# Patient Record
Sex: MALE | Race: WHITE | Employment: OTHER | URBAN - METROPOLITAN AREA
[De-identification: names, ages, dates, MRNs, and addresses within clinical notes are randomized per-mention and may not be internally consistent; named-entity substitution may affect disease eponyms.]

---

## 2016-01-28 LAB — CREATININE, EXTERNAL: 0.65

## 2017-01-28 LAB
CREATININE, EXTERNAL: 0.6
HBA1C MFR BLD HPLC: 5.7 %

## 2017-02-16 ENCOUNTER — TELEPHONE (OUTPATIENT)
Dept: ONCOLOGY | Age: 47
End: 2017-02-16

## 2017-02-16 ENCOUNTER — OFFICE VISIT (OUTPATIENT)
Dept: ONCOLOGY | Age: 47
End: 2017-02-16

## 2017-02-16 VITALS
WEIGHT: 167 LBS | BODY MASS INDEX: 25.31 KG/M2 | TEMPERATURE: 98.1 F | OXYGEN SATURATION: 96 % | RESPIRATION RATE: 18 BRPM | HEIGHT: 68 IN

## 2017-02-16 DIAGNOSIS — R63.4 UNINTENTIONAL WEIGHT LOSS: ICD-10-CM

## 2017-02-16 DIAGNOSIS — D50.9 IRON DEFICIENCY ANEMIA, UNSPECIFIED IRON DEFICIENCY ANEMIA TYPE: Primary | ICD-10-CM

## 2017-02-16 NOTE — MR AVS SNAPSHOT
Visit Information Date & Time Provider Department Dept. Phone Encounter #  
 2/16/2017 12:00 PM Tami Marcum  [facility] Oncology at [facility] 003 [id number] Your Appointments 5/8/2017 10:00 AM  
ROUTINE CARE with Jose Carlos Grant MD  
[facility]) Appt Note: 6 months follow up visit [address] [id number]  
[phone]  
  
   
 [address] [id number] Upcoming Health Maintenance Date Due  
 LIPID PANEL Q1 11/8/2014 HEMOGLOBIN A1C Q6M 5/7/2017 DTaP/Tdap/Td series (2 - Td) 2/22/2024 Allergies as of 2/16/2017  Review Complete On: 2/16/2017 By: Tami Marcum MD  
  
 Severity Noted Reaction Type Reactions Invega [Paliperidone]  11/16/2011    Unknown (comments) Current Immunizations  Reviewed on 5/4/2015 Name Date Influenza Vaccine 12/22/2015, 11/7/2013 Influenza Vaccine (Quad) PF 11/7/2016 10:35 AM  
 Pneumococcal Polysaccharide (PPSV-23) 5/4/2015  2:30 PM  
 Tdap 2/22/2014  9:48 AM, 11/7/2013 Not reviewed this visit You Were Diagnosed With   
  
 Codes Comments Iron deficiency anemia, unspecified iron deficiency anemia type    -  Primary ICD-10-CM: D50.9 ICD-9-CM: 280.9 Unintentional weight loss     ICD-10-CM: R63.4 ICD-9-CM: 783.21 Vitals Temp Resp Height(growth percentile) Weight(growth percentile) SpO2 BMI  
 98.1 °F (36.7 °C) (Oral) 18 5' 8\" (1.727 m) 167 lb (75.8 kg) 96% 25.39 kg/m2 Smoking Status Never Smoker BMI and BSA Data Body Mass Index Body Surface Area  
 25.39 kg/m 2 1.91 m 2 Preferred Pharmacy Pharmacy Name Phone [facility] - [address] [place] [address] [phone] Your Updated Medication List  
  
   
This list is accurate as of: 2/16/17 12:43 PM.  Always use your most recent med list.  
  
  
  
  
 BISAC-EVAC 10 mg suppository Generic drug:  bisacodyl Insert 10 mg into rectum every other day. BOOST HIGH PROTEIN PO Take  by mouth. Boost Chocolate Flavor Drink contents of 1 ml three times daily for weight loss  
  
 bromocriptine 2.5 mg tablet Commonly known as:  PARLODEL Take 2.5 mg by mouth two (2) times a day. clonazePAM 0.5 mg tablet Commonly known as:  Lyons Altes Take  by mouth two (2) times a day. docusate sodium 100 mg capsule Commonly known as:  Kristen Mule Take 100 mg by mouth two (2) times a day. lithium carbonate  mg CR tablet Commonly known as:  Selestino Kelch Take 300 mg by mouth four (4) times daily. LORazepam 2 mg tablet Commonly known as:  ATIVAN Take  by mouth every eight (8) hours as needed for Anxiety. LUNESTA 2 mg tablet Generic drug:  eszopiclone Take 3 mg by mouth nightly. melatonin 3 mg tablet Take 1 tablet by mouth 2 hours prior to bedtime ONE-A-DAY MEN'S MULTIVITAMIN 400-300 mcg Tab Generic drug:  multivit with min-FA-lycopene Take  by mouth. PREVACID 30 mg capsule Generic drug:  lansoprazole Take 30 mg by mouth Daily (before breakfast). propranolol 80 mg tablet Commonly known as:  INDERAL Take  by mouth two (2) times a day. Hold if systolic  Pressure is below 100 RHINOCORT AQUA 32 mcg/actuation nasal spray Generic drug:  budesonide 2 Sprays by Both Nostrils route two (2) times a day. SANCTURA 20 mg tablet Generic drug:  trospium Take  by mouth two (2) times a day. SENEXON 8.6 mg tablet Generic drug:  senna Take 1 Tab by mouth two (2) times a day. traZODone 300 mg tablet Commonly known as:  Rojas Fray Take 300 mg by mouth nightly. VITAMIN D2 50,000 unit capsule Generic drug:  ergocalciferol Take 50,000 Units by mouth every seven (7) days. On Mondays XANAX 0.5 mg tablet Generic drug:  ALPRAZolam  
 Take  by mouth. Take 1 tablet by mouth 30 minutes prior to CT scan for sedation We Performed the Following REFERRAL TO GASTROENTEROLOGY [ZAY29 Custom] To-Do List   
 03/30/2017 Lab:  CBC WITH AUTOMATED DIFF   
  
 03/30/2017 Lab:  FERRITIN   
  
 03/30/2017 Lab:  METABOLIC PANEL, COMPREHENSIVE [facility] SERVICES! Dear Jem Mccarthy: Thank you for requesting a [facility] account. Our records indicate that you already have an active [facility] account. You can access your account anytime at [email or web address] Did you know that you can access your hospital and ER discharge instructions at any time in [facility]? You can also review all of your test results from your hospital stay or ER visit. Additional Information If you have questions, please visit the Frequently Asked Questions section of the [facility] website at [email or web address] Remember, [facility] is NOT to be used for urgent needs. For medical emergencies, dial 911. Now available from your iPhone and Android! Please provide this summary of care documentation to your next provider. Your primary care clinician is listed as Marko Barrett. If you have any questions after today's visit, please call [phone].

## 2017-02-16 NOTE — PROGRESS NOTES
Hematology/Oncology Consult    REASON FOR CONSULT: Iron deficiency anemia  REQUESTED BY: Dr. Rissa Berumen: Mr. Edmund Whiteside" is a 49 y.o. male who presents for evaluation of iron deficiency anemia due to chronic blood loss. He had a traumatic brain injury in 2001. He is a resident at a [facility] neurorehab facility. He can give me limited history. Denies bleeding. I do have records from Dr. Laura Espinal and Dr. Isael Burns. He did have bleeding ulcers in 2015. Right now, no epistaxis, no hemoptysis, no hematemesis, no blood per rectum, no black or tarry stool. No hematuria. CT abdomen 12/16 was unremarkable. He has not seen a gastroenterologist.  Has lost about 20 pounds unintentionally. Past Medical History:   Diagnosis Date    DM (diabetes mellitus) ([facility] [place] [address] 8/10/2010    father says no D. 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 8/10/2010    Mood disorder in conditions classified elsewhere 8/10/2010    Other specified transient mental disorders due to conditions classified elsewhere(293.89) 8/10/2010    Quadriplegia, unspecified ([facility] [address] 8/10/2010    Skin lesion 2/10/2014    Small bowel obstruction ([facility] [address] 4/6/2015       Past Surgical History:   Procedure Laterality Date    ABDOMEN SURGERY PROC UNLISTED      \"Ulcer surgery\"/Implantable Baclofen pump    HX OTHER SURGICAL  4/12/15    ANTONIO, exp lap, sm bowel resection    HX OTHER SURGICAL  4/15/15    exp lap with wash out        Allergies   Allergen Reactions    Invega [Paliperidone] Unknown (comments)       Current Outpatient Prescriptions   Medication Sig Dispense Refill    senna (SENEXON) 8.6 mg tablet Take 1 Tab by mouth two (2) times a day.  melatonin 3 mg tablet Take 1 tablet by mouth 2 hours prior to bedtime      trospium (SANCTURA) 20 mg tablet Take  by mouth two (2) times a day.       clonazePAM (KLONOPIN) 0.5 mg tablet Take  by mouth two (2) times a day.  LACTOSE-REDUCED FOOD (BOOST HIGH PROTEIN PO) Take  by mouth. Boost Chocolate Flavor Drink contents of 1 ml three times daily for weight loss      bisacodyl (BISAC-EVAC) 10 mg suppository Insert 10 mg into rectum every other day.  bromocriptine (PARLODEL) 2.5 mg tablet Take 2.5 mg by mouth two (2) times a day.  docusate sodium (COLACE) 100 mg capsule Take 100 mg by mouth two (2) times a day.  lithium carbonate SR (LITHOBID) 300 mg CR tablet Take 300 mg by mouth four (4) times daily.  traZODone (DESYREL) 300 mg tablet Take 300 mg by mouth nightly.  propranolol (INDERAL) 80 mg tablet Take  by mouth two (2) times a day. Hold if systolic  Pressure is below 100      LORazepam (ATIVAN) 2 mg tablet Take  by mouth every eight (8) hours as needed for Anxiety.  eszopiclone (LUNESTA) 2 mg tablet Take 3 mg by mouth nightly.  ergocalciferol (VITAMIN D) 50,000 unit capsule Take 50,000 Units by mouth every seven (7) days. On Mondays      budesonide (RHINOCORT AQUA) 32 mcg/Actuation nasal spray 2 Sprays by Both Nostrils route two (2) times a day.  ALPRAZolam (XANAX) 0.5 mg tablet Take  by mouth. Take 1 tablet by mouth 30 minutes prior to CT scan for sedation      multivit with min-FA-lycopene (ONE-A-DAY MEN'S MULTIVITAMIN) 400-300 mcg tab Take  by mouth.  lansoprazole (PREVACID) 30 mg capsule Take 30 mg by mouth Daily (before breakfast).          Social History     Social History    Marital status: SINGLE     Spouse name: N/A    Number of children: N/A    Years of education: N/A     Social History Main Topics    Smoking status: Never Smoker    Smokeless tobacco: Never Used    Alcohol use No      Comment: prior alcoholic    Drug use: No    Sexual activity: Not on file     Other Topics Concern    Not on file     Social History Narrative       Family History   Problem Relation Age of Onset    Cancer Father Waldenstroms macroglobulinemia        ROS  As per the HPI, otherwise a comprehensive ROS is negative. Physical Examination:   Visit Vitals    Temp 98.1 °F (36.7 °C) (Oral)    Resp 18    Ht 5' 8\" (1.727 m)    Wt 167 lb (75.8 kg)    SpO2 96%    BMI 25.39 kg/m2     General appearance - alert, in wheelchair, and in no distress  Mental status - gives some history, is pleasant. oriented to person, place, and time  Mouth - mucous membranes moist, pharynx normal without lesions  Neck - supple, no significant adenopathy  Lymphatics - no palpable lymphadenopathy, no hepatosplenomegaly  Chest - clear to auscultation, no wheezes, rales or rhonchi, symmetric air entry  Heart - normal rate, regular rhythm, normal S1, S2, no murmurs, rubs, clicks or gallops  Abdomen - soft, nontender, nondistended, no masses or organomegaly, bowel sounds present  Musculoskeletal - no joint tenderness, deformity or swelling  Extremities - peripheral pulses normal, no pedal edema, no clubbing or cyanosis  Skin - normal coloration and turgor, no rashes, no suspicious skin lesions noted    LABS  Lab Results   Component Value Date/Time    WBC 13.6 05/04/2015 01:40 AM    HGB 9.8 05/04/2015 01:40 AM    HCT 31.7 05/04/2015 01:40 AM    PLATELET 675 26/96/8591 01:40 AM    MCV 82.8 05/04/2015 01:40 AM    ABS. NEUTROPHILS 10.7 05/04/2015 01:40 AM     Lab Results   Component Value Date/Time    Sodium 136 05/01/2015 01:09 PM    Potassium 4.1 05/01/2015 01:09 PM    Chloride 107 05/01/2015 01:09 PM    CO2 22 05/01/2015 01:09 PM    Glucose 195 05/01/2015 01:09 PM    BUN 13 05/01/2015 01:09 PM    Creatinine 0.59 05/01/2015 01:09 PM    GFR est AA >60 05/01/2015 01:09 PM    GFR est non-AA >60 05/01/2015 01:09 PM    Calcium 8.4 05/01/2015 01:09 PM     Lab Results   Component Value Date/Time    AST (SGOT) 20 04/14/2015 02:45 AM    Alk.  phosphatase 80 04/14/2015 02:45 AM    Protein, total 4.6 04/14/2015 02:45 AM    Albumin 2.1 04/14/2015 02:45 AM    Globulin 2.5 04/14/2015 02:45 AM    A-G Ratio 0.8 04/14/2015 02:45 AM     IMAGING  CT abdomen on 12/20/16 with marked distention of the rectum and sigmoid colon which is filled with fecal material.: Just proximal to this is air-filled and distended. ASSESSMENT  Mr. Brandy Turcios is a 49 y.o. male who presents for evaluation of anemia. DISCUSSION/PLAN  1. Anemia. I reviewed his labs in detail. He has long-standing anemia which at times has been microcytic. He has decreased iron stores. Clinically this is a picture of iron deficiency anemia. He cannot tolerate oral iron due to issues with constipation. We discussed the risks and benefits of IV iron in detail, including the small but real risk of allergic reaction, including fatal anaphylaxis. He aggress to proceed. The next issue is the source of the blood loss. I have referred him to gastroenterology. He does have a history of ulcers. 2. Weight loss. He has iron deficiency anemia and a 20 pound unintentional weight loss. These are alarm signs for malignancy. It is reassuring that he had a normal-appearing CT scan of the abdomen in December 2016. However, he still needs to see gastroenterology. I have placed a referral.    The best  for infusions and lab results is  [facility] - [phone]. I'll see him back in 6 weeks with repeat labs, but sooner if needed. Dayami Lebron MD    Mr. Brandy Turcios has a reminder for a \"due or due soon\" health maintenance. I have asked that he contact his primary care provider for follow-up on this health maintenance.

## 2017-02-17 ENCOUNTER — TELEPHONE (OUTPATIENT)
Dept: FAMILY MEDICINE CLINIC | Age: 47
End: 2017-02-17

## 2017-02-17 NOTE — TELEPHONE ENCOUNTER
100 Saravanan Ortega email from [facility] asking to have medication list review per Dr Palomino Sosa send to Gala Gibson to evaluate medication     [facility]

## 2017-02-17 NOTE — TELEPHONE ENCOUNTER
----- Message from TRAM Simon sent at 2/17/2017 10:12 AM EST -----  Regarding: Medication review  Reviewed his medications as requested. Looks like patient has been on many of these medications for a while. Here are some considerations. 1) Lithium:  Please check to see if there are more recent thyroid function tests (last ones in chart are 11/8/13). If not would recommend getting these yearly. Lithium levels and Scr are up to date and wnl/therapeutic. 2)  Patient is on melatonin, lunesta, and trazodone. Patient may need all of these, but would continually reassess the need for all three of these. 3)  Make sure staff is checking peripheral pulses on patient intermittantly. Bromocriptine can increase the risk of peripheral vasoconstriction when used with concomitant beta-blockers. He is on low doses of the these medications. Let me know if you have any questions.     Roddy Pickard

## 2017-03-20 ENCOUNTER — TELEPHONE (OUTPATIENT)
Dept: ONCOLOGY | Age: 47
End: 2017-03-20

## 2017-03-20 NOTE — TELEPHONE ENCOUNTER
[facility] calling to see the status of the IV Iron Infusions. Please call the  Tonya Pires back at [phone].

## 2017-03-23 RX ORDER — SODIUM CHLORIDE 9 MG/ML
25 INJECTION, SOLUTION INTRAVENOUS CONTINUOUS
Status: DISPENSED | OUTPATIENT
Start: 2017-03-27 | End: 2017-03-28

## 2017-03-27 ENCOUNTER — HOSPITAL ENCOUNTER (OUTPATIENT)
Dept: INFUSION THERAPY | Age: 47
Discharge: HOME | End: 2017-03-27
Payer: MEDICARE

## 2017-03-27 VITALS
DIASTOLIC BLOOD PRESSURE: 88 MMHG | RESPIRATION RATE: 18 BRPM | OXYGEN SATURATION: 97 % | SYSTOLIC BLOOD PRESSURE: 133 MMHG | TEMPERATURE: 97.9 F | HEART RATE: 88 BPM

## 2017-03-27 PROCEDURE — 96365 THER/PROPH/DIAG IV INF INIT: CPT

## 2017-03-27 PROCEDURE — 74011250636 HC RX REV CODE- 250/636: Performed by: NURSE PRACTITIONER

## 2017-03-27 RX ORDER — SODIUM CHLORIDE 0.9 % (FLUSH) 0.9 %
10-40 SYRINGE (ML) INJECTION AS NEEDED
Status: DISCONTINUED | OUTPATIENT
Start: 2017-03-27 | End: 2017-03-31 | Stop reason: HOSPADM

## 2017-03-27 RX ADMIN — Medication 10 ML: at 12:40

## 2017-03-27 RX ADMIN — FERRIC CARBOXYMALTOSE INJECTION 750 MG: 50 INJECTION, SOLUTION INTRAVENOUS at 14:23

## 2017-03-27 RX ADMIN — SODIUM CHLORIDE 25 ML/HR: 900 INJECTION, SOLUTION INTRAVENOUS at 12:45

## 2017-03-27 NOTE — PROGRESS NOTES
46 Pt admit to [facility] for [facility] patient arrived  in wheelchair with caretaker stable condition. Assessment completed. No new concerns voiced. Peripheral IV established right forearm with positive blood return. Normal Saline started at [facility]. Medication ordered. Visit Vitals    /88    Pulse 88    Temp 97.9 °F (36.6 °C)    Resp 18    SpO2 97%       Medications:  Normal Saline KVO  Injectafer IV    1545 Pt tolerated treatment well. Peripheral IV maintained positive blood return throughout treatment, flushed with positive blood return and removed at conclusion. D/c home in wheelchair with caretaker in  no distress. Pt aware of next appointment scheduled for 4/3/17.

## 2017-03-30 DIAGNOSIS — D50.9 IRON DEFICIENCY ANEMIA, UNSPECIFIED IRON DEFICIENCY ANEMIA TYPE: ICD-10-CM

## 2017-03-30 DIAGNOSIS — R63.4 UNINTENTIONAL WEIGHT LOSS: ICD-10-CM

## 2017-04-03 ENCOUNTER — HOSPITAL ENCOUNTER (OUTPATIENT)
Dept: INFUSION THERAPY | Age: 47
Discharge: HOME | End: 2017-04-03
Payer: MEDICARE

## 2017-04-03 VITALS
SYSTOLIC BLOOD PRESSURE: 119 MMHG | OXYGEN SATURATION: 100 % | RESPIRATION RATE: 18 BRPM | DIASTOLIC BLOOD PRESSURE: 78 MMHG | TEMPERATURE: 98 F | HEART RATE: 69 BPM

## 2017-04-03 PROCEDURE — 74011250636 HC RX REV CODE- 250/636: Performed by: NURSE PRACTITIONER

## 2017-04-03 PROCEDURE — 96374 THER/PROPH/DIAG INJ IV PUSH: CPT

## 2017-04-03 RX ORDER — SODIUM CHLORIDE 0.9 % (FLUSH) 0.9 %
5-10 SYRINGE (ML) INJECTION AS NEEDED
Status: CANCELLED | OUTPATIENT
Start: 2017-04-03 | End: 2017-04-04

## 2017-04-03 RX ADMIN — FERRIC CARBOXYMALTOSE INJECTION 750 MG: 50 INJECTION, SOLUTION INTRAVENOUS at 12:34

## 2017-04-03 NOTE — PROGRESS NOTES
200  Pt admit to [facility] for [facility] patient arrived  in wheelchair with caretaker stable condition. Assessment completed. No new concerns voiced. Peripheral IV established right forearm with positive blood return. Normal Saline started at [facility]. Medication ordered. Patient Vitals for the past 12 hrs:   Temp Pulse Resp BP SpO2   04/03/17 1302 98 °F (36.7 °C) 69 18 119/78 100 %   04/03/17 1155 98 °F (36.7 °C) 66 18 101/72 96 %         Medications:  Normal Saline KVO  Injectafer IV    1300 Pt tolerated treatment well. Peripheral IV maintained positive blood return throughout treatment, flushed with positive blood return and removed at conclusion. D/c home in wheelchair with caretaker in  no distress.  Pt to follow up with md

## 2017-04-26 ENCOUNTER — TELEPHONE (OUTPATIENT)
Dept: ONCOLOGY | Age: 47
End: 2017-04-26

## 2017-04-26 LAB
ALBUMIN SERPL-MCNC: 4.1 G/DL (ref 3.5–5.5)
ALBUMIN/GLOB SERPL: 2.3 {RATIO} (ref 1.2–2.2)
ALP SERPL-CCNC: 72 IU/L (ref 39–117)
ALT SERPL-CCNC: 17 IU/L (ref 0–44)
AST SERPL-CCNC: 26 IU/L (ref 0–40)
BASOPHILS # BLD AUTO: NORMAL 10*3/UL
BILIRUB SERPL-MCNC: 0.3 MG/DL (ref 0–1.2)
BUN SERPL-MCNC: 13 MG/DL (ref 6–24)
BUN/CREAT SERPL: 22 (ref 9–20)
CALCIUM SERPL-MCNC: 8.4 MG/DL (ref 8.7–10.2)
CHLORIDE SERPL-SCNC: 102 MMOL/L (ref 96–106)
CO2 SERPL-SCNC: 15 MMOL/L (ref 18–29)
CREAT SERPL-MCNC: 0.6 MG/DL (ref 0.76–1.27)
EOSINOPHIL # BLD AUTO: NORMAL 10*3/UL
EOSINOPHIL NFR BLD AUTO: NORMAL %
FERRITIN SERPL-MCNC: 235 NG/ML (ref 30–400)
GLOBULIN SER CALC-MCNC: 1.8 G/DL (ref 1.5–4.5)
GLUCOSE SERPL-MCNC: 76 MG/DL (ref 65–99)
HCT VFR BLD AUTO: NORMAL %
HGB BLD-MCNC: NORMAL G/DL
LYMPHOCYTES # BLD AUTO: NORMAL 10*3/UL
LYMPHOCYTES NFR BLD AUTO: NORMAL %
MONOCYTES NFR BLD AUTO: NORMAL %
NEUTROPHILS NFR BLD AUTO: NORMAL %
PLATELET # BLD AUTO: NORMAL 10*3/UL
POTASSIUM SERPL-SCNC: 8 MMOL/L (ref 3.5–5.2)
PROT SERPL-MCNC: 5.9 G/DL (ref 6–8.5)
RBC # BLD AUTO: NORMAL 10*6/UL
SODIUM SERPL-SCNC: 135 MMOL/L (ref 134–144)
WBC # BLD AUTO: NORMAL X10E3/UL

## 2017-04-28 ENCOUNTER — HOSPITAL ENCOUNTER (OUTPATIENT)
Age: 47
Setting detail: OUTPATIENT SURGERY
Discharge: HOME | End: 2017-04-28
Attending: INTERNAL MEDICINE | Admitting: INTERNAL MEDICINE
Payer: MEDICARE

## 2017-04-28 ENCOUNTER — ANESTHESIA (OUTPATIENT)
Dept: ENDOSCOPY | Age: 47
End: 2017-04-28
Payer: MEDICARE

## 2017-04-28 ENCOUNTER — ANESTHESIA EVENT (OUTPATIENT)
Dept: ENDOSCOPY | Age: 47
End: 2017-04-28
Payer: MEDICARE

## 2017-04-28 VITALS
BODY MASS INDEX: 18.79 KG/M2 | DIASTOLIC BLOOD PRESSURE: 50 MMHG | HEIGHT: 68 IN | RESPIRATION RATE: 18 BRPM | HEART RATE: 55 BPM | SYSTOLIC BLOOD PRESSURE: 116 MMHG | OXYGEN SATURATION: 99 % | WEIGHT: 124 LBS | TEMPERATURE: 97.6 F

## 2017-04-28 PROCEDURE — 77030027957 HC TBNG IRR ENDOGTR BUSS -B: Performed by: INTERNAL MEDICINE

## 2017-04-28 PROCEDURE — 74011000250 HC RX REV CODE- 250

## 2017-04-28 PROCEDURE — 76060000031 HC ANESTHESIA FIRST 0.5 HR: Performed by: INTERNAL MEDICINE

## 2017-04-28 PROCEDURE — 74011250636 HC RX REV CODE- 250/636

## 2017-04-28 PROCEDURE — 76040000019: Performed by: INTERNAL MEDICINE

## 2017-04-28 RX ORDER — DEXTROMETHORPHAN/PSEUDOEPHED 2.5-7.5/.8
1.2 DROPS ORAL
Status: DISCONTINUED | OUTPATIENT
Start: 2017-04-28 | End: 2017-04-28 | Stop reason: HOSPADM

## 2017-04-28 RX ORDER — LIDOCAINE HYDROCHLORIDE 20 MG/ML
INJECTION, SOLUTION EPIDURAL; INFILTRATION; INTRACAUDAL; PERINEURAL AS NEEDED
Status: DISCONTINUED | OUTPATIENT
Start: 2017-04-28 | End: 2017-04-28 | Stop reason: HOSPADM

## 2017-04-28 RX ORDER — MIDAZOLAM HYDROCHLORIDE 1 MG/ML
.25-1 INJECTION, SOLUTION INTRAMUSCULAR; INTRAVENOUS
Status: DISCONTINUED | OUTPATIENT
Start: 2017-04-28 | End: 2017-04-28 | Stop reason: HOSPADM

## 2017-04-28 RX ORDER — NALOXONE HYDROCHLORIDE 0.4 MG/ML
0.4 INJECTION, SOLUTION INTRAMUSCULAR; INTRAVENOUS; SUBCUTANEOUS
Status: DISCONTINUED | OUTPATIENT
Start: 2017-04-28 | End: 2017-04-28 | Stop reason: HOSPADM

## 2017-04-28 RX ORDER — ATROPINE SULFATE 0.1 MG/ML
0.5 INJECTION INTRAVENOUS
Status: DISCONTINUED | OUTPATIENT
Start: 2017-04-28 | End: 2017-04-28 | Stop reason: HOSPADM

## 2017-04-28 RX ORDER — SODIUM CHLORIDE 9 MG/ML
50 INJECTION, SOLUTION INTRAVENOUS CONTINUOUS
Status: DISCONTINUED | OUTPATIENT
Start: 2017-04-28 | End: 2017-04-28 | Stop reason: HOSPADM

## 2017-04-28 RX ORDER — FENTANYL CITRATE 50 UG/ML
100 INJECTION, SOLUTION INTRAMUSCULAR; INTRAVENOUS
Status: DISCONTINUED | OUTPATIENT
Start: 2017-04-28 | End: 2017-04-28 | Stop reason: HOSPADM

## 2017-04-28 RX ORDER — SODIUM CHLORIDE 0.9 % (FLUSH) 0.9 %
5-10 SYRINGE (ML) INJECTION AS NEEDED
Status: DISCONTINUED | OUTPATIENT
Start: 2017-04-28 | End: 2017-04-28 | Stop reason: HOSPADM

## 2017-04-28 RX ORDER — PROPOFOL 10 MG/ML
INJECTION, EMULSION INTRAVENOUS AS NEEDED
Status: DISCONTINUED | OUTPATIENT
Start: 2017-04-28 | End: 2017-04-28 | Stop reason: HOSPADM

## 2017-04-28 RX ORDER — SODIUM CHLORIDE 9 MG/ML
INJECTION, SOLUTION INTRAVENOUS
Status: DISCONTINUED | OUTPATIENT
Start: 2017-04-28 | End: 2017-04-28 | Stop reason: HOSPADM

## 2017-04-28 RX ORDER — FLUMAZENIL 0.1 MG/ML
0.2 INJECTION INTRAVENOUS
Status: DISCONTINUED | OUTPATIENT
Start: 2017-04-28 | End: 2017-04-28 | Stop reason: HOSPADM

## 2017-04-28 RX ORDER — EPINEPHRINE 0.1 MG/ML
1 INJECTION INTRACARDIAC; INTRAVENOUS
Status: DISCONTINUED | OUTPATIENT
Start: 2017-04-28 | End: 2017-04-28 | Stop reason: HOSPADM

## 2017-04-28 RX ORDER — SODIUM CHLORIDE 0.9 % (FLUSH) 0.9 %
5-10 SYRINGE (ML) INJECTION EVERY 8 HOURS
Status: DISCONTINUED | OUTPATIENT
Start: 2017-04-28 | End: 2017-04-28 | Stop reason: HOSPADM

## 2017-04-28 RX ADMIN — PROPOFOL 20 MG: 10 INJECTION, EMULSION INTRAVENOUS at 09:43

## 2017-04-28 RX ADMIN — PROPOFOL 20 MG: 10 INJECTION, EMULSION INTRAVENOUS at 09:45

## 2017-04-28 RX ADMIN — PROPOFOL 50 MG: 10 INJECTION, EMULSION INTRAVENOUS at 09:47

## 2017-04-28 RX ADMIN — PROPOFOL 20 MG: 10 INJECTION, EMULSION INTRAVENOUS at 09:41

## 2017-04-28 RX ADMIN — SODIUM CHLORIDE: 9 INJECTION, SOLUTION INTRAVENOUS at 09:28

## 2017-04-28 RX ADMIN — LIDOCAINE HYDROCHLORIDE 40 MG: 20 INJECTION, SOLUTION EPIDURAL; INFILTRATION; INTRACAUDAL; PERINEURAL at 09:39

## 2017-04-28 RX ADMIN — PROPOFOL 20 MG: 10 INJECTION, EMULSION INTRAVENOUS at 09:39

## 2017-04-28 NOTE — ROUTINE PROCESS
Laura Smoke  1970  [id number]    Situation:  Verbal report received from: [facility]  Procedure: Procedure(s):  COLONOSCOPY / EGD   ESOPHAGOGASTRODUODENOSCOPY (EGD)    Background:    Preoperative diagnosis: ABNORMAL WEIGHT LOSS   Postoperative diagnosis: 1.- SP Gastric Resection    :  Dr. Hallie Farrell  Assistant(s): Endoscopy Technician-1: Cindy Young IV  Endoscopy RN-1: Chaim Payton RN    Specimens: * No specimens in log *  H. Pylori  no    Assessment:  Intra-procedure medications     Anesthesia gave intra-procedure sedation and medications, see anesthesia flow sheet yes    Intravenous fluids: NS@ KVO     Vital signs stable     Abdominal assessment: round and soft     Recommendation:  Discharge patient per MD order.   Return to   [facility] or Friend   Permission to share finding with family or friend yes

## 2017-04-28 NOTE — ANESTHESIA POSTPROCEDURE EVALUATION
Post-Anesthesia Evaluation and Assessment    Patient: Marnie Amaya MRN: [id number]  SSN: [id number]    YOB: 1970  Age: 49 y.o. Sex: male       Cardiovascular Function/Vital Signs  Visit Vitals    /69    Pulse (!) 51    Temp 36.6 °C (97.8 °F)    Resp 11    Ht 5' 8\" (1.727 m)    Wt 56.2 kg (124 lb)    SpO2 99%    BMI 18.85 kg/m2       Patient is status post MAC anesthesia for Procedure(s):  COLONOSCOPY / EGD   ESOPHAGOGASTRODUODENOSCOPY (EGD). Nausea/Vomiting: None    Postoperative hydration reviewed and adequate. Pain:  Pain Scale 1: Visual (04/28/17 1004)  Pain Intensity 1: 0 (04/28/17 1004)   Managed    Neurological Status: At baseline    Mental Status and Level of Consciousness: Arousable    Pulmonary Status:   O2 Device: Room air (04/28/17 1004)   Adequate oxygenation and airway patent    Complications related to anesthesia: None    Post-anesthesia assessment completed.  No concerns    Signed By: Cayla Peña MD     April 28, 2017

## 2017-04-28 NOTE — PROCEDURES
[facility].  [address] [place] [address] [address] , [address]  [phone]                           Colonoscopy and EGD Procedure Note      Indications:    Anemia     :  Kip Ernst MD    Referring Provider: Gayle Hargrove MD    Sedation:  MAC anesthesia    Procedure Details:  After informed consent was obtained with all risks and benefits of procedure explained and preoperative exam completed, the patient was taken to the endoscopy suite and placed in the left lateral decubitus position. Upon sequential sedation as per above, a digital rectal exam was performed  And was normal.  The Olympus videocolonoscope  was inserted in the rectum and carefully advanced to the sigmoid colon. The quality of preparation was inadequate. The colonoscope was slowly withdrawn with careful evaluation between folds. Retroflexion in the rectum was performed and was normal..     Colon Findings:   Rectum: stool present;;  Sigmoid: stool present;;  Descending Colon: not intubated  Transverse Colon: not intubated  Ascending Colon: not intubated  Cecum: not intubated  Terminal Ileum: not intubated      Following sequential administration of sedation as per above, the [id number] gastroscope was inserted into the mouth and advanced under direct vision to proximal jejunum. A careful inspection was made as the gastroscope was withdrawn, including a retroflexed view of the proximal stomach; findings and interventions are described below. EGD Findings:  Esophagus:normal  Stomach: There was evidence of gastric resection and gastrojejunostomy. The anastomosis was normal. Both eh limbs were entered and appeared normal  Duodenum/jejunum:normal afferent and efferent limbs      Therapies:  none    Complications:   None; patient tolerated the procedure well. Impression:    See Postoperative diagnosis above    Recommendations:  -Continue acid suppression. , -Follow symptoms. ,   -Follow clinical symptoms and laboratory studies for evidence of rebleeding. , -No NSAIDS  -It would be hard to prep him given quadriplegia and moreover with a 2 day prep he was full of stool  -FIT test for colon polyps  -Resume normal medication(s). Interventions:  none    Complications: None. Specimen Removed:  * No specimens in log *    EBL:  None. Discharge Disposition:  Home in the company of a  when able to ambulate.     Victorino Pitts MD  4/28/2017  9:54 AM

## 2017-04-28 NOTE — H&P
[facility]  [facility] 140 [facility], [address]  [phone]                                History and Physical     NAME: Jalil Ziegler   :  1970   MRN:  [id number]     HPI:  The patient was seen and examined. Past Surgical History:   Procedure Laterality Date    ABDOMEN SURGERY PROC UNLISTED      \"Ulcer surgery\"/Implantable Baclofen pump    HX OTHER SURGICAL  4/12/15    ANTONIO, exp lap, sm bowel resection    HX OTHER SURGICAL  4/15/15    exp lap with wash out      Past Medical History:   Diagnosis Date    DM (diabetes mellitus) ([facility] [address] 8/10/2010    father says no D. 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 ([facility] [address] 8/10/2010    Mood disorder in conditions classified elsewhere 8/10/2010    Other specified transient mental disorders due to conditions classified elsewhere(293.89) 8/10/2010    Quadriplegia, unspecified ([facility] [address] 8/10/2010    Skin lesion 2/10/2014    Small bowel obstruction ([facility] [address] 2015     Social History   Substance Use Topics    Smoking status: Never Smoker    Smokeless tobacco: Never Used    Alcohol use No      Comment: prior alcoholic     Allergies   Allergen Reactions    Invega [Paliperidone] Unknown (comments)     Family History   Problem Relation Age of Onset    Cancer Father      Waldenstroms macroglobulinemia      Current Facility-Administered Medications   Medication Dose Route Frequency    0.9% sodium chloride infusion  50 mL/hr IntraVENous CONTINUOUS    sodium chloride (NS) flush 5-10 mL  5-10 mL IntraVENous Q8H    sodium chloride (NS) flush 5-10 mL  5-10 mL IntraVENous PRN    midazolam (VERSED) injection 0.25-10 mg  0.25-10 mg IntraVENous Multiple    fentaNYL citrate (PF) injection 100 mcg  100 mcg IntraVENous MULTIPLE DOSE GIVEN    naloxone (NARCAN) injection 0.4 mg  0.4 mg IntraVENous Multiple    flumazenil (ROMAZICON) 0.1 mg/mL injection 0.2 mg  0.2 mg IntraVENous Multiple    simethicone (MYLICON) 67AN/6.3KX oral drops 80 mg  1.2 mL Oral Multiple    atropine injection 0.5 mg  0.5 mg IntraVENous ONCE PRN    EPINEPHrine (ADRENALIN) 0.1 mg/mL syringe 1 mg  1 mg Endoscopically ONCE PRN         PHYSICAL EXAM:  General: WD, WN. Alert, cooperative, no acute distress    HEENT: NC, Atraumatic. PERRLA, EOMI. Anicteric sclerae. Lungs:  CTA Bilaterally. No Wheezing/Rhonchi/Rales. Heart:  Regular  rhythm,  No murmur, No Rubs, No Gallops  Abdomen: Soft, Non distended, Non tender.  +Bowel sounds, no HSM  Extremities: quadripegic    The heart, lungs and mental status were satisfactory for the administration of MAC sedation and for the procedure.       Mallampati score: 3       Assessment:   · Anemia  · Weight loss    Plan:   · Endoscopic procedure  · MAC sedation   ·

## 2017-04-28 NOTE — DISCHARGE INSTRUCTIONS
118 [facility].  [address] [address] [address], [place] [address]  Yuli Barron  [id number]  1970    DISCOMFORT:  Redness at IV site- apply warm compress to area; if redness or soreness persist- contact your physician  There may be a slight amount of blood passed from the rectum  Gaseous discomfort- walking, belching will help relieve any discomfort  You may not operate a vehicle for 12 hours  You may not  engage in an occupation involving machinery or appliances for rest of today  You may not  drink alcoholic beverages for at least 12 hours  Avoid making any critical decisions for at least 24 hour    DIET:   High fiber diet. - however -  remember your colon is empty and a heavy meal will produce gas. Avoid these foods:  vegetables, fried / greasy foods, carbonated drinks for today      ACTIVITY  You may resume your normal daily activities   Spend the remainder of the day resting -  avoid any strenuous activity. CALL M.D. ANY SIGN OF   Increasing pain, nausea, vomiting  Abdominal distension (swelling)  New increased bleeding (oral or rectal)  Fever (chills)  Pain in chest area  Bloody discharge from nose or mouth  Shortness of breath    You may not  take any Advil, Aspirin, Ibuprofen, Motrin, Aleve, or Goodys for 7 days, ONLY  Tylenol as needed for pain. Post procedure diagnosis:  1.- SP Gastric Resection    Follow-up Instructions:   Call Dr. Lei Elise for any questions or problems. If we took a biopsy please call the office within 2 weeks to discuss your                             pathology results.  Telephone # [phone]

## 2017-04-28 NOTE — IP AVS SNAPSHOT
2700 [facility] [address] [place] [address] 
[phone] Patient: Elena Rahman MRN: [id number] :1970 You are allergic to the following Allergen Reactions Invega (Paliperidone) Unknown (comments) Recent Documentation Height Weight BMI Smoking Status 1.727 m 56.2 kg 18.85 kg/m2 Never Smoker Emergency Contacts Name Discharge Info Relation Home Work Mobile Vanna Quiles DISCHARGE CAREGIVER [3] Parent [1] [phone] Elizabeth Post DISCHARGE CAREGIVER [3] Mother [14] [phone] About your hospitalization You were admitted on:  2017 You last received care in the:  [facility] ENDOSCOPY You were discharged on:  2017 Unit phone number:  [phone] Why you were hospitalized Your primary diagnosis was:  Not on File Providers Seen During Your Hospitalizations Provider Role Specialty Primary office phone Pia Patel MD Attending Provider Gastroenterology [phone] Your Primary Care Physician (PCP) Primary Care Physician Office Phone Office Fax Cotyrubi Minerva [phone] Follow-up Information None Your Appointments Tuesday May 02, 2017  9:30 AM EDT Follow Up with Cuong Maddox MD  
[address] [place] [address] Oncology at [facility] [address] [address] [address] [place] [address]  
[phone] Monday May 08, 2017 10:00 AM EDT ROUTINE CARE with Eloy Moreno MD  
[facility] [address] [address]  
[phone] Current Discharge Medication List  
  
CONTINUE these medications which have NOT CHANGED Dose & Instructions Dispensing Information Comments Morning Noon Evening Bedtime BISAC-EVAC 10 mg suppository Generic drug:  bisacodyl Your last dose was: Your next dose is:    
   
   
 Dose:  10 mg Insert 10 mg into rectum every other day. Refills:  0  
     
   
   
   
  
 BOOST HIGH PROTEIN PO Your last dose was: Your next dose is: Take  by mouth. Boost Chocolate Flavor Drink contents of 1 ml three times daily for weight loss Refills:  0  
     
   
   
   
  
 bromocriptine 2.5 mg tablet Commonly known as:  PARLODEL Your last dose was: Your next dose is:    
   
   
 Dose:  2.5 mg Take 2.5 mg by mouth two (2) times a day. Refills:  0  
     
   
   
   
  
 clonazePAM 0.5 mg tablet Commonly known as:  Darnella Sermons Your last dose was: Your next dose is: Take  by mouth two (2) times a day. Refills:  0  
     
   
   
   
  
 docusate sodium 100 mg capsule Commonly known as:  Lanny Sers Your last dose was: Your next dose is:    
   
   
 Dose:  100 mg Take 100 mg by mouth two (2) times a day. Refills:  0  
     
   
   
   
  
 lithium carbonate  mg CR tablet Commonly known as:  [place] Ernst Your last dose was: Your next dose is:    
   
   
 Dose:  300 mg Take 300 mg by mouth four (4) times daily. Refills:  0 LORazepam 2 mg tablet Commonly known as:  ATIVAN Your last dose was: Your next dose is: Take  by mouth every eight (8) hours as needed for Anxiety. Refills:  0 LUNESTA 2 mg tablet Generic drug:  eszopiclone Your last dose was: Your next dose is:    
   
   
 Dose:  3 mg Take 3 mg by mouth nightly. Refills:  0  
     
   
   
   
  
 melatonin 3 mg tablet Your last dose was: Your next dose is: Take 1 tablet by mouth 2 hours prior to bedtime Refills:  0  
     
   
   
   
  
 ONE-A-DAY MEN'S MULTIVITAMIN 400-300 mcg Tab Generic drug:  multivit with min-FA-lycopene Your last dose was: Your next dose is: Take  by mouth. Refills:  0 PREVACID 30 mg capsule Generic drug:  lansoprazole Your last dose was: Your next dose is:    
   
   
 Dose:  30 mg Take 30 mg by mouth Daily (before breakfast). Refills:  0  
     
   
   
   
  
 propranolol 80 mg tablet Commonly known as:  INDERAL Your last dose was: Your next dose is: Take  by mouth two (2) times a day. Hold if systolic  Pressure is below 100 Refills:  0 RHINOCORT AQUA 32 mcg/actuation nasal spray Generic drug:  budesonide Your last dose was: Your next dose is:    
   
   
 Dose:  2 Spray 2 Sprays by Both Nostrils route two (2) times a day. Refills:  0  
     
   
   
   
  
 SANCTURA 20 mg tablet Generic drug:  trospium Your last dose was: Your next dose is: Take  by mouth two (2) times a day. Refills:  0 SENEXON 8.6 mg tablet Generic drug:  senna Your last dose was: Your next dose is:    
   
   
 Dose:  1 Tab Take 1 Tab by mouth two (2) times a day. Refills:  0  
     
   
   
   
  
 traZODone 300 mg tablet Commonly known as:  Yovana Chroman Your last dose was: Your next dose is:    
   
   
 Dose:  300 mg Take 300 mg by mouth nightly. Refills:  0  
     
   
   
   
  
 VITAMIN D2 50,000 unit capsule Generic drug:  ergocalciferol Your last dose was: Your next dose is:    
   
   
 Dose:  03317 Units Take 50,000 Units by mouth every seven (7) days. On Mondays Refills:  0  
     
   
   
   
  
 XANAX 0.5 mg tablet Generic drug:  ALPRAZolam  
   
Your last dose was: Your next dose is: Take  by mouth. Take 1 tablet by mouth 30 minutes prior to CT scan for sedation Refills:  0 Discharge Instructions 118 [facility]. 
217 [facility] [address] [place], [address] 
[phone] DISCHARGE INSTRUCTIONS Carlotta Lott 
[id number] 
1970 DISCOMFORT: 
Redness at IV site- apply warm compress to area; if redness or soreness persist- contact your physician There may be a slight amount of blood passed from the rectum Gaseous discomfort- walking, belching will help relieve any discomfort You may not operate a vehicle for 12 hours You may not  engage in an occupation involving machinery or appliances for rest of today You may not  drink alcoholic beverages for at least 12 hours Avoid making any critical decisions for at least 24 hour DIET: 
 High fiber diet.  however -  remember your colon is empty and a heavy meal will produce gas. Avoid these foods:  vegetables, fried / greasy foods, carbonated drinks for today ACTIVITY You may resume your normal daily activities Spend the remainder of the day resting -  avoid any strenuous activity. CALL M.D. ANY SIGN OF Increasing pain, nausea, vomiting Abdominal distension (swelling) New increased bleeding (oral or rectal) Fever (chills) Pain in chest area Bloody discharge from nose or mouth Shortness of breath You may not  take any Advil, Aspirin, Ibuprofen, Motrin, Aleve, or Goodys for 7 days, ONLY  Tylenol as needed for pain. Post procedure diagnosis:  1.- SP Gastric Resection Follow-up Instructions: 
 Call Dr. Elina Riddle for any questions or problems. If we took a biopsy please call the office within 2 weeks to discuss your                             pathology results. Telephone # [phone] Discharge Orders None Introducing [facility] & [facility]! Dear Brie Yuen: Thank you for requesting a PastBook account. Our records indicate that you already have an active PastBook account. You can access your account anytime at [email or web address] Did you know that you can access your hospital and ER discharge instructions at any time in RxAnte? You can also review all of your test results from your hospital stay or ER visit. Additional Information If you have questions, please visit the Frequently Asked Questions section of the RxAnte website at [email or web address] Remember, [email or web address] is NOT to be used for urgent needs. For medical emergencies, dial 911. Now available from your iPhone and Android! General Information Please provide this summary of care documentation to your next provider. Patient Signature:  ____________________________________________________________ Date:  ____________________________________________________________  
  
Neita [facility] Provider Signature:  ____________________________________________________________ Date:  ____________________________________________________________

## 2017-04-28 NOTE — ANESTHESIA PREPROCEDURE EVALUATION
Anesthetic History   No history of anesthetic complications            Review of Systems / Medical History  Patient summary reviewed, nursing notes reviewed and pertinent labs reviewed    Pulmonary  Within defined limits                 Neuro/Psych   Within defined limits           Cardiovascular  Within defined limits                     GI/Hepatic/Renal  Within defined limits              Endo/Other    Diabetes: well controlled, type 2         Other Findings   Comments: Quadriplegia            Physical Exam    Airway  Mallampati: II  TM Distance: > 6 cm  Neck ROM: normal range of motion   Mouth opening: Normal     Cardiovascular  Regular rate and rhythm,  S1 and S2 normal,  no murmur, click, rub, or gallop             Dental  No notable dental hx       Pulmonary  Breath sounds clear to auscultation               Abdominal  GI exam deferred       Other Findings            Anesthetic Plan    ASA: 3  Anesthesia type: MAC          Induction: Intravenous  Anesthetic plan and risks discussed with: Patient

## 2017-05-02 ENCOUNTER — OFFICE VISIT (OUTPATIENT)
Dept: ONCOLOGY | Age: 47
End: 2017-05-02

## 2017-05-02 VITALS
RESPIRATION RATE: 18 BRPM | HEIGHT: 68 IN | SYSTOLIC BLOOD PRESSURE: 104 MMHG | DIASTOLIC BLOOD PRESSURE: 69 MMHG | BODY MASS INDEX: 19.7 KG/M2 | HEART RATE: 71 BPM | WEIGHT: 130 LBS | TEMPERATURE: 97.7 F

## 2017-05-02 DIAGNOSIS — D50.0 IRON DEFICIENCY ANEMIA DUE TO CHRONIC BLOOD LOSS: Primary | ICD-10-CM

## 2017-05-02 RX ORDER — OMEPRAZOLE 40 MG/1
40 CAPSULE, DELAYED RELEASE ORAL DAILY
COMMUNITY
End: 2017-11-26

## 2017-05-02 NOTE — PROGRESS NOTES
Hematology/Oncology Progress Note    REASON FOR VISIT: Iron deficiency anemia    HISTORY OF PRESENT ILLNESS: Mr. Margarita Solitario" is a 49 y.o. male who presents for follow-up of iron deficiency anemia due to chronic blood loss. He had a traumatic brain injury in 2001. He is a resident at a [facility]. I first saw him in February 2017 for iron deficiency anemia due to chronic blood loss. He has been under the care of Dr. Charla Banda and Dr. Sheryl Garcia. He did have bleeding ulcers in 2015. Iron infusions with Injectafer given on 3/27/17 and 4/3/17. Presents for follow-up. Had an EGD and colonoscopy on 4/28/17. Limited exam due to prep, but no clear sign of cancer or bleeding. Doing fine since I saw him last.  No epistaxis, no hemoptysis, no hematemesis, no blood per rectum, no black or tarry stool. No hematuria. Weight has been more stable than before. Previously with large weight loss (~20 pounds). As before, CT abdomen 12/16 was unremarkable. Past Medical History:   Diagnosis Date    DM (diabetes mellitus) ([address] 8/10/2010    father says no D. 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 HX OTHER SURGICAL  4/12/15    ANTONIO, exp lap, sm bowel resection    HX OTHER SURGICAL  4/15/15    exp lap with wash out        Allergies Allergen Reactions    Invega [Paliperidone] Unknown (comments)       Current Outpatient Prescriptions   Medication Sig Dispense Refill    omeprazole (PRILOSEC) 40 mg capsule Take 40 mg by mouth daily.  OTHER Take 1 Tab by mouth daily.  senna (SENEXON) 8.6 mg tablet Take 1 Tab by mouth two (2) times a day.  melatonin 3 mg tablet Take 1 tablet by mouth 2 hours prior to bedtime      trospium (SANCTURA) 20 mg tablet Take  by mouth two (2) times a day.  ALPRAZolam (XANAX) 0.5 mg tablet Take  by mouth. Take 1 tablet by mouth 30 minutes prior to CT scan for sedation      clonazePAM (KLONOPIN) 0.5 mg tablet Take  by mouth two (2) times a day.  LACTOSE-REDUCED FOOD (BOOST HIGH PROTEIN PO) Take  by mouth. Boost Chocolate Flavor Drink contents of 1 ml three times daily for weight loss      bisacodyl (BISAC-EVAC) 10 mg suppository Insert 10 mg into rectum every other day.  bromocriptine (PARLODEL) 2.5 mg tablet Take 2.5 mg by mouth two (2) times a day.  lithium carbonate SR (LITHOBID) 300 mg CR tablet Take 300 mg by mouth four (4) times daily.  multivit with min-FA-lycopene (ONE-A-DAY MEN'S MULTIVITAMIN) 400-300 mcg tab Take  by mouth.  traZODone (DESYREL) 300 mg tablet Take 300 mg by mouth nightly.  propranolol (INDERAL) 80 mg tablet Take  by mouth two (2) times a day. Hold if systolic  Pressure is below 100      LORazepam (ATIVAN) 2 mg tablet Take  by mouth every eight (8) hours as needed for Anxiety.  eszopiclone (LUNESTA) 2 mg tablet Take 3 mg by mouth nightly.  ergocalciferol (VITAMIN D) 50,000 unit capsule Take 50,000 Units by mouth every seven (7) days. On Mondays      budesonide (RHINOCORT AQUA) 32 mcg/Actuation nasal spray 2 Sprays by Both Nostrils route two (2) times a day.  docusate sodium (COLACE) 100 mg capsule Take 100 mg by mouth two (2) times a day.  lansoprazole (PREVACID) 30 mg capsule Take 30 mg by mouth Daily (before breakfast). Social History     Social History    Marital status: SINGLE     Spouse name: N/A    Number of children: N/A    Years of education: N/A     Social History Main Topics    Smoking status: Never Smoker    Smokeless tobacco: Never Used    Alcohol use No      Comment: prior alcoholic    Drug use: No    Sexual activity: Not Asked     Other Topics Concern    None     Social History Narrative       Family History   Problem Relation Age of Onset    Cancer Father      Waldenstroms macroglobulinemia      ROS  As per the HPI, otherwise a comprehensive ROS is negative. Physical Examination:   Visit Vitals    /69 (BP 1 Location: Left arm, BP Patient Position: Sitting)    Pulse 71    Temp 97.7 °F (36.5 °C) (Oral)    Resp 18    Ht 5' 8\" (1.727 m)    Wt 130 lb (59 kg)    BMI 19.77 kg/m2     General appearance - alert, in wheelchair, and in no distress  Mental status - gives some history, is pleasant. oriented to person, place  Mouth - mucous membranes moist, pharynx normal without lesions  Neck - supple, no significant adenopathy  Lymphatics - no palpable lymphadenopathy, no hepatosplenomegaly  Chest - clear to auscultation, no wheezes, rales or rhonchi, symmetric air entry  Heart - normal rate, regular rhythm, normal S1, S2, no murmurs, rubs, clicks or gallops  Abdomen - soft, nontender, nondistended, no masses or organomegaly, bowel sounds present  Musculoskeletal - no joint tenderness, deformity or swelling  Extremities - peripheral pulses normal, no pedal edema, no clubbing or cyanosis  Skin - normal coloration and turgor, no rashes, no suspicious skin lesions noted    LABS  Lab Results   Component Value Date/Time    WBC CANCELED 04/25/2017 12:00 PM    HGB CANCELED 04/25/2017 12:00 PM    HCT CANCELED 04/25/2017 12:00 PM    PLATELET CANCELED 83/43/9358 12:00 PM    MCV 82.8 05/04/2015 01:40 AM    ABS.  NEUTROPHILS 10.7 05/04/2015 01:40 AM     Lab Results   Component Value Date/Time    Sodium 135 04/25/2017 12:00 PM    Potassium 8.0 04/25/2017 12:00 PM    Chloride 102 04/25/2017 12:00 PM    CO2 15 04/25/2017 12:00 PM    Glucose 76 04/25/2017 12:00 PM    BUN 13 04/25/2017 12:00 PM    Creatinine 0.60 04/25/2017 12:00 PM    GFR est  04/25/2017 12:00 PM    GFR est non- 04/25/2017 12:00 PM    Calcium 8.4 04/25/2017 12:00 PM     Lab Results   Component Value Date/Time    AST (SGOT) 26 04/25/2017 12:00 PM    Alk. phosphatase 72 04/25/2017 12:00 PM    Protein, total 5.9 04/25/2017 12:00 PM    Albumin 4.1 04/25/2017 12:00 PM    Globulin 2.5 04/14/2015 02:45 AM    A-G Ratio 2.3 04/25/2017 12:00 PM     IMAGING  CT abdomen on 12/20/16 with marked distention of the rectum and sigmoid colon which is filled with fecal material.: Just proximal to this is air-filled and distended. ASSESSMENT  Mr. Rishabh Xiong is a 49 y.o. male who presents for evaluation of anemia. DISCUSSION/PLAN  1. Anemia. I reviewed his labs in detail. Ferritin 234. Hemoglobin up to 12.6. His anemia has corrected with the IV iron. This should be monitored. I don't think he has ongoing losses. I would check CBC with ferritin every 3 months for the first year, then every 6 months if all stable. If the ferritin drops out of the normal range he should have another infusion. This can be checked by Dr. Robert Cortés or Dr. John Ledesma if they are comfortable. 2. Weight loss. Has seen GI. Stable now. The best  is  [facility]il[facility] Hopkins [phone]. Unfortunately, I will not be with the practice after August 2017. He can follow up with my partner, Dr. Salina Roe if needed. Sita Rincon MD    Mr. Rishabh Xiong has a reminder for a \"due or due soon\" health maintenance. I have asked that he contact his primary care provider for follow-up on this health maintenance.

## 2017-05-08 ENCOUNTER — OFFICE VISIT (OUTPATIENT)
Dept: FAMILY MEDICINE CLINIC | Age: 47
End: 2017-05-08

## 2017-05-08 VITALS
TEMPERATURE: 98.3 F | DIASTOLIC BLOOD PRESSURE: 78 MMHG | WEIGHT: 130 LBS | SYSTOLIC BLOOD PRESSURE: 134 MMHG | OXYGEN SATURATION: 96 % | RESPIRATION RATE: 15 BRPM | BODY MASS INDEX: 19.7 KG/M2 | HEART RATE: 66 BPM | HEIGHT: 68 IN

## 2017-05-08 DIAGNOSIS — G82.50 QUADRIPLEGIA, UNSPECIFIED (HCC): Primary | ICD-10-CM

## 2017-05-08 DIAGNOSIS — F06.30 MOOD DISORDER IN CONDITIONS CLASSIFIED ELSEWHERE: ICD-10-CM

## 2017-05-08 DIAGNOSIS — R73.03 PREDIABETES: ICD-10-CM

## 2017-05-08 DIAGNOSIS — E55.9 VITAMIN D DEFICIENCY: ICD-10-CM

## 2017-05-08 DIAGNOSIS — Z13.220 LIPID SCREENING: ICD-10-CM

## 2017-05-08 NOTE — PROGRESS NOTES
Chief Complaint   Patient presents with    Labs     6 months follow up visit    Cholesterol Problem     Identified pt with two pt identifiers (Name @ )    1. Have you been to the ER, urgent care clinic since your last visit? Hospitalized since your last visit? No    2. Have you seen or consulted any other health care providers outside of the [facility] since your last visit? Include any pap smears or colon screening.  No     \"REVIEWED RECORD IN PREPARATION FOR VISIT AND HAVE OBTAINED NECESSARY DOCUMENTATION\"

## 2017-05-08 NOTE — MR AVS SNAPSHOT
Visit Information Date & Time Provider Department Dept. Phone Encounter #  
 5/8/2017 10:00 AM Nellie Colin  [facility] [phone] [id number] Follow-up Instructions Return in about 6 months (around 11/8/2017) for chronic follow up. Upcoming Health Maintenance Date Due  
 LIPID PANEL Q1 11/8/2014 HEMOGLOBIN A1C Q6M 7/28/2017 INFLUENZA AGE 9 TO ADULT 8/1/2017 DTaP/Tdap/Td series (2 - Td) 2/22/2024 Allergies as of 5/8/2017  Review Complete On: 5/8/2017 By: Nellie Colin MD  
  
 Severity Noted Reaction Type Reactions Invega [Paliperidone]  11/16/2011    Unknown (comments) Current Immunizations  Reviewed on 4/3/2017 Name Date Influenza Vaccine 12/22/2015, 11/7/2013 Influenza Vaccine (Quad) PF 11/7/2016 10:35 AM  
 Pneumococcal Polysaccharide (PPSV-23) 5/4/2015  2:30 PM  
 Tdap 2/22/2014  9:48 AM, 11/7/2013 Not reviewed this visit You Were Diagnosed With   
  
 Codes Comments Quadriplegia, unspecified ([facility] [address]    -  Primary ICD-10-CM: G82.50 ICD-9-CM: 344.00 Mood disorder in conditions classified elsewhere     ICD-10-CM: F06.30 ICD-9-CM: 293.83 Vitamin D deficiency     ICD-10-CM: E55.9 ICD-9-CM: 268.9 Prediabetes     ICD-10-CM: R73.03 
ICD-9-CM: 790.29 Lipid screening     ICD-10-CM: J87.877 ICD-9-CM: V77.91 Vitals BP Pulse Temp Resp Height(growth percentile) Weight(growth percentile) 134/78 (BP 1 Location: Left arm, BP Patient Position: Sitting) 66 98.3 °F (36.8 °C) (Oral) 15 5' 8\" (1.727 m) 130 lb (59 kg) SpO2 BMI Smoking Status 96% 19.77 kg/m2 Never Smoker Vitals History BMI and BSA Data Body Mass Index Body Surface Area  
 19.77 kg/m 2 1.68 m 2 Preferred Pharmacy Pharmacy Name Phone [facility] [address] [address] [address] [phone] Your Updated Medication List  
  
   
 This list is accurate as of: 5/8/17 10:40 AM.  Always use your most recent med list.  
  
  
  
  
 BISAC-EVAC 10 mg suppository Generic drug:  bisacodyl Insert 10 mg into rectum every other day. BOOST HIGH PROTEIN PO Take  by mouth. Boost Chocolate Flavor Drink contents of 1 ml three times daily for weight loss  
  
 bromocriptine 2.5 mg tablet Commonly known as:  PARLODEL Take 2.5 mg by mouth two (2) times a day. clonazePAM 0.5 mg tablet Commonly known as:  Migue Resides Take  by mouth two (2) times a day. docusate sodium 100 mg capsule Commonly known as:  Elva Oak Take 100 mg by mouth two (2) times a day. lithium carbonate  mg CR tablet Commonly known as:  Arlin Kailee Take 300 mg by mouth four (4) times daily. LORazepam 2 mg tablet Commonly known as:  ATIVAN Take  by mouth every eight (8) hours as needed for Anxiety. LUNESTA 2 mg tablet Generic drug:  eszopiclone Take 3 mg by mouth nightly. melatonin 3 mg tablet Take 1 tablet by mouth 2 hours prior to bedtime  
  
 omeprazole 40 mg capsule Commonly known as:  PRILOSEC Take 40 mg by mouth daily. ONE-A-DAY MEN'S MULTIVITAMIN 400-300 mcg Tab Generic drug:  multivit with min-FA-lycopene Take  by mouth. OTHER Take 1 Tab by mouth daily. PREVACID 30 mg capsule Generic drug:  lansoprazole Take 30 mg by mouth Daily (before breakfast). propranolol 80 mg tablet Commonly known as:  INDERAL Take  by mouth two (2) times a day. Hold if systolic  Pressure is below 100 RHINOCORT AQUA 32 mcg/actuation nasal spray Generic drug:  budesonide 2 Sprays by Both Nostrils route two (2) times a day. SANCTURA 20 mg tablet Generic drug:  trospium Take  by mouth two (2) times a day. SENEXON 8.6 mg tablet Generic drug:  senna Take 1 Tab by mouth two (2) times a day. traZODone 300 mg tablet Commonly known as:  Redge [place]  
 Take 300 mg by mouth nightly. VITAMIN D2 50,000 unit capsule Generic drug:  ergocalciferol Take 50,000 Units by mouth every seven (7) days. On Mondays XANAX 0.5 mg tablet Generic drug:  ALPRAZolam  
Take  by mouth. Take 1 tablet by mouth 30 minutes prior to CT scan for sedation We Performed the Following HEMOGLOBIN A1C WITH EAG [45121 CPT(R)] LIPID PANEL [74521 CPT(R)] METABOLIC PANEL, COMPREHENSIVE [01015 CPT(R)] VITAMIN D, 25 HYROXY PANEL [[id number] Custom] Follow-up Instructions Return in about 6 months (around 11/8/2017) for chronic follow up. Introducing [facility] & HEALTH SERVICES! Dear Mercedes Oropeza: Thank you for requesting a Zenph account. Our records indicate that you already have an active Zenph account. You can access your account anytime at [email or web address] Did you know that you can access your hospital and ER discharge instructions at any time in Zenph? You can also review all of your test results from your hospital stay or ER visit. Additional Information If you have questions, please visit the Frequently Asked Questions section of the Zenph website at [email or web address] Remember, Zenph is NOT to be used for urgent needs. For medical emergencies, dial 911. Now available from your iPhone and Android! Please provide this summary of care documentation to your next provider. Your primary care clinician is listed as Doug Cordon. If you have any questions after today's visit, please call [phone].

## 2017-05-08 NOTE — PROGRESS NOTES
HISTORY OF PRESENT ILLNESS  Sailaja Slade is a 49 y.o. male. Blood pressure 134/78, pulse (P) 66, temperature (P) 98.3 °F (36.8 °C), temperature source (P) Oral, resp. rate 15, height 5' 8\" (1.727 m), weight 130 lb (59 kg), SpO2 96 %. Body mass index is 19.77 kg/(m^2). Chief Complaint   Patient presents with    Labs     6 months follow up visit    Cholesterol Problem      HPI   Sailaja Slade 49 y.o. male  presents to the office today for a follow up on chronic conditions. He presents with caregiver at bedside. He is a [facility] patient and is followed by Dr. Delmy Fernández. Bp at office today 134/78. Prediabetes: Pt has a history of prediabetes. His last A1c was 5.7% per labs on 01/28/17 which is increased from A1c of 5.4% per POC on 11/07/16. Prediabetes is presumed stable with A1c 5.7% on 01/28/17. Will reassess A1c today. Health maintenance: Pt comes to office today to complete fasting labs. He denies any headaches, chest pain, or dizziness. His mood is stable. Pt continues with Klonopin 0.5 mg BID, lithium carbonate 300 mg nightly, Trazodone 300 mg nightly, and Ativan 2 mg as needed. Current Outpatient Prescriptions   Medication Sig Dispense Refill    omeprazole (PRILOSEC) 40 mg capsule Take 40 mg by mouth daily.  OTHER Take 1 Tab by mouth daily.  senna (SENEXON) 8.6 mg tablet Take 1 Tab by mouth two (2) times a day.  melatonin 3 mg tablet Take 1 tablet by mouth 2 hours prior to bedtime      trospium (SANCTURA) 20 mg tablet Take  by mouth two (2) times a day.  ALPRAZolam (XANAX) 0.5 mg tablet Take  by mouth. Take 1 tablet by mouth 30 minutes prior to CT scan for sedation      clonazePAM (KLONOPIN) 0.5 mg tablet Take  by mouth two (2) times a day.  LACTOSE-REDUCED FOOD (BOOST HIGH PROTEIN PO) Take  by mouth.  Boost Chocolate Flavor Drink contents of 1 ml three times daily for weight loss      bisacodyl (BISAC-EVAC) 10 mg suppository Insert 10 mg into rectum every other day.  bromocriptine (PARLODEL) 2.5 mg tablet Take 2.5 mg by mouth two (2) times a day.  docusate sodium (COLACE) 100 mg capsule Take 100 mg by mouth two (2) times a day.  lithium carbonate SR (LITHOBID) 300 mg CR tablet Take 300 mg by mouth four (4) times daily.  multivit with min-FA-lycopene (ONE-A-DAY MEN'S MULTIVITAMIN) 400-300 mcg tab Take  by mouth.  traZODone (DESYREL) 300 mg tablet Take 300 mg by mouth nightly.  propranolol (INDERAL) 80 mg tablet Take  by mouth two (2) times a day. Hold if systolic  Pressure is below 100      LORazepam (ATIVAN) 2 mg tablet Take  by mouth every eight (8) hours as needed for Anxiety.  eszopiclone (LUNESTA) 2 mg tablet Take 3 mg by mouth nightly.  ergocalciferol (VITAMIN D) 50,000 unit capsule Take 50,000 Units by mouth every seven (7) days. On Mondays      lansoprazole (PREVACID) 30 mg capsule Take 30 mg by mouth Daily (before breakfast).  budesonide (RHINOCORT AQUA) 32 mcg/Actuation nasal spray 2 Sprays by Both Nostrils route two (2) times a day. 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 ([address] 8/10/2010    Mood disorder in conditions classified elsewhere 8/10/2010    Other specified transient mental disorders due to conditions classified elsewhere 8/10/2010    Quadriplegia, unspecified ([address] 8/10/2010    Skin lesion 2/10/2014    Small bowel obstruction ([address] 4/6/2015     Past Surgical History:   Procedure Laterality Date    ABDOMEN SURGERY PROC UNLISTED      \"Ulcer surgery\"/Implantable Baclofen pump    COLONOSCOPY N/A 4/28/2017    COLONOSCOPY / EGD  performed by Fifi Garcia MD at [address] OTHER SURGICAL  4/12/15 ANTONIO, exp lap, sm bowel resection    HX OTHER SURGICAL  4/15/15    exp lap with wash out      Family History   Problem Relation Age of Onset    Cancer Father      Waldenstroms macroglobulinemia      Social History   Substance Use Topics    Smoking status: Never Smoker    Smokeless tobacco: Never Used    Alcohol use No      Comment: prior alcoholic        Review of Systems   Constitutional: Negative. Negative for malaise/fatigue. Eyes: Negative for blurred vision. Respiratory: Negative for shortness of breath. Cardiovascular: Negative for chest pain and leg swelling. Musculoskeletal: Negative. Neurological: Negative. Negative for dizziness and headaches. All other systems reviewed and are negative. Physical Exam   Constitutional: He appears well-developed and well-nourished. HENT:   Head: Normocephalic and atraumatic. Neck: Carotid bruit is not present. Cardiovascular: Normal rate, regular rhythm, normal heart sounds and intact distal pulses. Exam reveals no gallop and no friction rub. No murmur heard. Pulmonary/Chest: Effort normal and breath sounds normal. No respiratory distress. He has no wheezes. He has no rales. He exhibits no tenderness. Neurological: He is alert. Nursing note and vitals reviewed. ASSESSMENT and PLAN  Reshma Morales was seen today for labs and cholesterol problem. Diagnoses and all orders for this visit:    Quadriplegia, unspecified ([address]    Mood disorder in conditions classified elsewhere  Stable, continue current regimen    Vitamin D deficiency  -     VITAMIN D, 25 HYROXY PANEL  - Presumed stable, will assess levels today    Prediabetes  -     METABOLIC PANEL, COMPREHENSIVE  -     HEMOGLOBIN A1C WITH EAG  - Presumed stable with A1c 5.7% per labs on 01/28/17. Will reassess A1c today. Lipid screening  -     METABOLIC PANEL, COMPREHENSIVE  -     LIPID PANEL      Follow-up Disposition:  Return in about 6 months (around 11/8/2017) for chronic follow up. Written by stephen Peters, as dictated by Jamila Doherty M.D.    I have reviewed and agree with the above note and have made corrections where appropriate, Dr. Hilaria Arteaga MD

## 2017-05-08 NOTE — LETTER
7/17/2017 1:33 PM 
 
Mr. Elena Rahman [address] [facility] [address] Dear Elena Rahman: 
 
Please find your most recent results below. Resulted Orders METABOLIC PANEL, COMPREHENSIVE Result Value Ref Range Glucose 84 65 - 99 mg/dL BUN 11 6 - 24 mg/dL Creatinine 0.55 (L) 0.76 - 1.27 mg/dL GFR est non- >59 mL/min/1.73 GFR est  >59 mL/min/1.73  
 BUN/Creatinine ratio 20 9 - 20 Sodium 141 134 - 144 mmol/L Potassium 4.8 3.5 - 5.2 mmol/L Chloride 105 96 - 106 mmol/L  
 CO2 23 18 - 29 mmol/L Calcium 8.8 8.7 - 10.2 mg/dL Protein, total 5.7 (L) 6.0 - 8.5 g/dL Albumin 4.0 3.5 - 5.5 g/dL GLOBULIN, TOTAL 1.7 1.5 - 4.5 g/dL A-G Ratio 2.4 (H) 1.2 - 2.2 Bilirubin, total 0.3 0.0 - 1.2 mg/dL Alk. phosphatase 89 39 - 117 IU/L  
 AST (SGOT) 12 0 - 40 IU/L  
 ALT (SGPT) 20 0 - 44 IU/L Narrative Performed at:  [address]  [id number] : Kalli Caballero MD, Phone:  [phone] LIPID PANEL Result Value Ref Range Cholesterol, total 167 100 - 199 mg/dL Triglyceride 78 0 - 149 mg/dL HDL Cholesterol 65 >39 mg/dL VLDL, calculated 16 5 - 40 mg/dL LDL, calculated 86 0 - 99 mg/dL Narrative Performed at:  [address]  [id number] : Kalli Caballero MD, Phone:  [phone] VITAMIN D, 25 HYROXY PANEL Result Value Ref Range 25-HYDROXY, VITAMIN D 45 ng/mL Comment:  
   Reference Range: All Ages: Target levels 30 - 100 
  
 25-HYDROXY, VITAMIN D-2 33 ng/mL 25-HYDROXY, VITAMIN D-3 12 ng/mL Narrative Performed at:  [address] [place] [address] Endocrinology [address], [place]  [de-identified] : Dejan Grimes MD, Phone:  [phone] HEMOGLOBIN A1C WITH EAG Result Value Ref Range Hemoglobin A1c 5.1 4.8 - 5.6 % Comment:  
            Pre-diabetes: 5.7 - 6.4 Diabetes: >6.4 Glycemic control for adults with diabetes: <7.0 Estimated average glucose 100 mg/dL Narrative Performed at:  [address]  [id number] : Arpan Tabor MD, Phone:  [phone] CVD REPORT Result Value Ref Range INTERPRETATION Note Comment:  
   Supplement report is available. Narrative Performed at:  [address] [place] [address]  [id number] : Angel Benavides PhD, Phone:  [phone] Labs are stable Except for slight decreased in protein Patient needs to increase protein in diet Please call me if you have any questions: [phone] Sincerely, Andry Briones MD

## 2017-05-21 LAB
25(OH)D2 SERPL-MCNC: 33 NG/ML
25(OH)D3 SERPL-MCNC: 12 NG/ML
25(OH)D3+25(OH)D2 SERPL-MCNC: 45 NG/ML
ALBUMIN SERPL-MCNC: 4 G/DL (ref 3.5–5.5)
ALBUMIN/GLOB SERPL: 2.4 {RATIO} (ref 1.2–2.2)
ALP SERPL-CCNC: 89 IU/L (ref 39–117)
ALT SERPL-CCNC: 20 IU/L (ref 0–44)
AST SERPL-CCNC: 12 IU/L (ref 0–40)
BILIRUB SERPL-MCNC: 0.3 MG/DL (ref 0–1.2)
BUN SERPL-MCNC: 11 MG/DL (ref 6–24)
BUN/CREAT SERPL: 20 (ref 9–20)
CALCIUM SERPL-MCNC: 8.8 MG/DL (ref 8.7–10.2)
CHLORIDE SERPL-SCNC: 105 MMOL/L (ref 96–106)
CHOLEST SERPL-MCNC: 167 MG/DL (ref 100–199)
CO2 SERPL-SCNC: 23 MMOL/L (ref 18–29)
CREAT SERPL-MCNC: 0.55 MG/DL (ref 0.76–1.27)
EST. AVERAGE GLUCOSE BLD GHB EST-MCNC: 100 MG/DL
GLOBULIN SER CALC-MCNC: 1.7 G/DL (ref 1.5–4.5)
GLUCOSE SERPL-MCNC: 84 MG/DL (ref 65–99)
HBA1C MFR BLD: 5.1 % (ref 4.8–5.6)
HDLC SERPL-MCNC: 65 MG/DL
INTERPRETATION, 910389: NORMAL
LDLC SERPL CALC-MCNC: 86 MG/DL (ref 0–99)
POTASSIUM SERPL-SCNC: 4.8 MMOL/L (ref 3.5–5.2)
PROT SERPL-MCNC: 5.7 G/DL (ref 6–8.5)
SODIUM SERPL-SCNC: 141 MMOL/L (ref 134–144)
TRIGL SERPL-MCNC: 78 MG/DL (ref 0–149)
VLDLC SERPL CALC-MCNC: 16 MG/DL (ref 5–40)

## 2017-07-16 NOTE — PROGRESS NOTES
Please inform livia that his labs are stable  Except for slight decreased in protein  Pt needs to increase protein in diet  Please inform her and fax labs to her

## 2017-08-02 DIAGNOSIS — D50.0 IRON DEFICIENCY ANEMIA DUE TO CHRONIC BLOOD LOSS: ICD-10-CM

## 2017-11-08 ENCOUNTER — OFFICE VISIT (OUTPATIENT)
Dept: FAMILY MEDICINE CLINIC | Age: 47
End: 2017-11-08

## 2017-11-08 VITALS
SYSTOLIC BLOOD PRESSURE: 110 MMHG | WEIGHT: 130 LBS | HEART RATE: 67 BPM | OXYGEN SATURATION: 97 % | HEIGHT: 68 IN | RESPIRATION RATE: 16 BRPM | BODY MASS INDEX: 19.7 KG/M2 | TEMPERATURE: 98.7 F | DIASTOLIC BLOOD PRESSURE: 72 MMHG

## 2017-11-08 DIAGNOSIS — R73.03 PREDIABETES: Primary | ICD-10-CM

## 2017-11-08 DIAGNOSIS — R53.81 MALAISE: ICD-10-CM

## 2017-11-08 DIAGNOSIS — G82.50 QUADRIPLEGIA (HCC): ICD-10-CM

## 2017-11-08 DIAGNOSIS — Z12.5 SCREENING PSA (PROSTATE SPECIFIC ANTIGEN): ICD-10-CM

## 2017-11-08 DIAGNOSIS — E55.9 VITAMIN D DEFICIENCY: ICD-10-CM

## 2017-11-08 RX ORDER — ADHESIVE BANDAGE
30 BANDAGE TOPICAL DAILY PRN
COMMUNITY
End: 2017-11-26

## 2017-11-08 RX ORDER — HYDROCORTISONE 25 MG/G
CREAM TOPICAL 2 TIMES DAILY
COMMUNITY
End: 2017-11-26

## 2017-11-08 NOTE — MR AVS SNAPSHOT
Visit Information Date & Time Provider Department Dept. Phone Encounter #  
 11/8/2017 10:00 AM Juan Olsen MD [address] [place] [address] [phone] [id number] Follow-up Instructions Return in about 6 months (around 5/8/2018) for chronic follow up. Upcoming Health Maintenance Date Due HEMOGLOBIN A1C Q6M 11/17/2017 LIPID PANEL Q1 5/17/2018 DTaP/Tdap/Td series (2 - Td) 2/22/2024 Allergies as of 11/8/2017  Review Complete On: 11/8/2017 By: Juan Olsen MD  
  
 Severity Noted Reaction Type Reactions Invega [Paliperidone]  11/16/2011    Unknown (comments) Current Immunizations  Reviewed on 11/7/2017 Name Date Influenza Vaccine 10/16/2017, 12/22/2015, 11/7/2013 Influenza Vaccine (Quad) PF 11/7/2016 10:35 AM  
 Pneumococcal Polysaccharide (PPSV-23) 5/4/2015  2:30 PM  
 Tdap 2/22/2014  9:48 AM, 11/7/2013 Not reviewed this visit You Were Diagnosed With   
  
 Codes Comments Prediabetes    -  Primary ICD-10-CM: R73.03 
ICD-9-CM: 790.29 Vitamin D deficiency     ICD-10-CM: E55.9 ICD-9-CM: 268.9 Quadriplegia ([address]     ICD-10-CM: G82.50 ICD-9-CM: 344.00 Malaise     ICD-10-CM: R53.81 ICD-9-CM: 780.79 Screening PSA (prostate specific antigen)     ICD-10-CM: Z12.5 ICD-9-CM: V76.44 Vitals BP Pulse Temp Resp Height(growth percentile) Weight(growth percentile) 110/72 (BP 1 Location: Right arm, BP Patient Position: Sitting) 67 98.7 °F (37.1 °C) (Oral) 16 5' 8\" (1.727 m) 130 lb (59 kg) SpO2 BMI Smoking Status 97% 19.77 kg/m2 Never Smoker Vitals History BMI and BSA Data Body Mass Index Body Surface Area  
 19.77 kg/m 2 1.68 m 2 Preferred Pharmacy Pharmacy Name Phone [facility] [phone] Your Updated Medication List  
  
   
This list is accurate as of: 11/8/17 11:27 AM.  Always use your most recent med list.  
  
 BISAC-EVAC 10 mg suppository Generic drug:  bisacodyl Insert 10 mg into rectum nightly. BOOST HIGH PROTEIN PO Take  by mouth. Boost Chocolate Flavor Drink contents of 1 ml three times daily for weight loss  
  
 bromocriptine 2.5 mg tablet Commonly known as:  PARLODEL Take 2.5 mg by mouth two (2) times a day. clonazePAM 0.5 mg tablet Commonly known as:  Harry Aw Take  by mouth two (2) times a day. docusate sodium 100 mg capsule Commonly known as:  [place] Ton Take 100 mg by mouth two (2) times a day. hydrocortisone 2.5 % topical cream  
Commonly known as:  HYTONE Apply  to affected area two (2) times a day. use thin layer  
  
 lithium carbonate  mg CR tablet Commonly known as:  Yojana Crew Take 300 mg by mouth four (4) times daily. LORazepam 2 mg tablet Commonly known as:  ATIVAN Take  by mouth every eight (8) hours as needed for Anxiety. LUNESTA 2 mg tablet Generic drug:  eszopiclone Take 3 mg by mouth nightly. melatonin 3 mg tablet Take 1 tablet by mouth 2 hours prior to bedtime  
  
 omeprazole 40 mg capsule Commonly known as:  PRILOSEC Take 40 mg by mouth daily. ONE-A-DAY MEN'S MULTIVITAMIN 400- mcg Tab Generic drug:  multivit-min-folic-vit K-lycop Take  by mouth. OTHER Take 1 Tab by mouth daily. GENAO MILK OF MAGNESIA 400 mg/5 mL suspension Generic drug:  magnesium hydroxide Take 30 mL by mouth daily as needed for Constipation. PREVACID 30 mg capsule Generic drug:  lansoprazole Take 30 mg by mouth Daily (before breakfast). PROBIOTIC 4X 10-15 mg Tbec Generic drug:  B.infantis-B.ani-B.long-B.bifi Take  by mouth.  
  
 propranolol 80 mg tablet Commonly known as:  INDERAL Take  by mouth two (2) times a day. Hold if systolic  Pressure is below 100 RHINOCORT AQUA 32 mcg/actuation nasal spray Generic drug:  budesonide 2 Sprays by Both Nostrils route two (2) times a day. SANCTURA 20 mg tablet Generic drug:  trospium Take  by mouth two (2) times a day. SENEXON 8.6 mg tablet Generic drug:  senna Take 1 Tab by mouth two (2) times a day. traZODone 300 mg tablet Commonly known as:  Rich Gary Take 300 mg by mouth nightly. VITAMIN D2 50,000 unit capsule Generic drug:  ergocalciferol Take 50,000 Units by mouth every seven (7) days. On Mondays XANAX 0.5 mg tablet Generic drug:  ALPRAZolam  
Take  by mouth. Take 1 tablet by mouth 30 minutes prior to CT scan for sedation We Performed the Following CBC WITH AUTOMATED DIFF [08969 CPT(R)] HEMOGLOBIN A1C WITH EAG [85644 CPT(R)] LIPID PANEL [43818 CPT(R)] METABOLIC PANEL, COMPREHENSIVE [80053 CPT(R)] PSA W/ REFLX FREE PSA [34244 CPT(R)] T4, FREE [id number] CPT(R)] TSH 3RD GENERATION [07217 CPT(R)] VITAMIN D, 25 HYDROXY [id number] CPT(R)] Follow-up Instructions Return in about 6 months (around 5/8/2018) for chronic follow up. To-Do List   
 11/27/2017 10:30 AM  
  Appointment with [address] [place] [address] PAT EXAM RM 4 at [address] [place] [address] ([phone]) Patient Instructions Learning About Vitamin D Why is it important to get enough vitamin D? Your body needs vitamin D to absorb calcium. Calcium keeps your bones and muscles, including your heart, healthy and strong. If your muscles don't get enough calcium, they can cramp, hurt, or feel weak. You may have long-term (chronic) muscle aches and pains. If you don't get enough vitamin D throughout life, you have an increased chance of having thin and brittle bones (osteoporosis) in your later years. Children who don't get enough vitamin D may not grow as much as others their age. They also have a chance of getting a rare disease called rickets. It causes weak bones. Vitamin D and calcium are added to many foods.  And your body uses sunshine to make its own vitamin D. How much vitamin D do you need? The [place] of Medicine recommends that people ages 3 through 79 get 600 IU (international units) every day. Adults 71 and older need 800 IU every day. Blood tests for vitamin D can check your vitamin D level. But there is no standard normal range used by all laboratories. The [place] of [employer] recommends a blood level of 20 ng/mL of vitamin D for healthy bones. And most people in the [place] and [facility] meet this goal. 
How can you get more vitamin D? Foods that contain vitamin D include: 
· [place], tuna, and mackerel. These are some of the best foods to eat when you need to get more vitamin D. 
· Cheese, egg yolks, and beef liver. These foods have vitamin D in small amounts. · Milk, soy drinks, orange juice, yogurt, margarine, and some kinds of cereal have vitamin D added to them. Some people don't make vitamin D as well as others. They may have to take extra care in getting enough vitamin D. Things that reduce how much vitamin D your body makes include: · Dark skin, such as many  Americans have. · Age, especially if you are older than 72. · Digestive problems, such as Crohn's or celiac disease. · Liver and kidney disease. Some people who do not get enough vitamin D may need supplements. Are there any risks from taking vitamin D? 
· Too much vitamin D: 
¨ Can damage your kidneys. ¨ Can cause nausea and vomiting, constipation, and weakness. ¨ Raises the amount of calcium in your blood. If this happens, you can get confused or have an irregular heart rhythm. · Vitamin D may interact with other medicines. Tell your doctor about all of the medicines you take, including over-the-counter drugs, herbs, and pills. Tell your doctor about all of your current medical problems. Where can you learn more? Go to http://nicki-rhonda.info/. Enter [id number] in the search box to learn more about \"Learning About Vitamin D.\" Current as of: May 12, 2017 Content Version: 11.4 © 2615-0940 Healthwise, Kash. Care instructions adapted under license by [employer] (which disclaims liability or warranty for this information). If you have questions about a medical condition or this instruction, always ask your healthcare professional. [address] any warranty or liability for your use of this information. Introducing [facility]! Dear Rosa David: Thank you for requesting a [employer] account. Our records indicate that you already have an active [employer] account. You can access your account anytime at [email or web address]. [email or web address] Did you know that you can access your hospital and ER discharge instructions at any time in [employer]? You can also review all of your test results from your hospital stay or ER visit. Additional Information If you have questions, please visit the Frequently Asked Questions section of the [employer] website at [email or web address] Remember, [employer] is NOT to be used for urgent needs. For medical emergencies, dial 911. Now available from your iPhone and Android! Please provide this summary of care documentation to your next provider. Your primary care clinician is listed as Brian Garcia. If you have any questions after today's visit, please call [phone].

## 2017-11-08 NOTE — PATIENT INSTRUCTIONS
Learning About Vitamin D  Why is it important to get enough vitamin D? Your body needs vitamin D to absorb calcium. Calcium keeps your bones and muscles, including your heart, healthy and strong. If your muscles don't get enough calcium, they can cramp, hurt, or feel weak. You may have long-term (chronic) muscle aches and pains. If you don't get enough vitamin D throughout life, you have an increased chance of having thin and brittle bones (osteoporosis) in your later years. Children who don't get enough vitamin D may not grow as much as others their age. They also have a chance of getting a rare disease called rickets. It causes weak bones. Vitamin D and calcium are added to many foods. And your body uses sunshine to make its own vitamin D. How much vitamin D do you need? The [place] of Medicine recommends that people ages 3 through 79 get 600 IU (international units) every day. Adults 71 and older need 800 IU every day. Blood tests for vitamin D can check your vitamin D level. But there is no standard normal range used by all laboratories. The [place] of Medicine recommends a blood level of 20 ng/mL of vitamin D for healthy bones. And most people in the [place] and [facility] meet this goal.  How can you get more vitamin D? Foods that contain vitamin D include:  · [place], tuna, and mackerel. These are some of the best foods to eat when you need to get more vitamin D.  · Cheese, egg yolks, and beef liver. These foods have vitamin D in small amounts. · Milk, soy drinks, orange juice, yogurt, margarine, and some kinds of cereal have vitamin D added to them. Some people don't make vitamin D as well as others. They may have to take extra care in getting enough vitamin D. Things that reduce how much vitamin D your body makes include:  · Dark skin, such as many  Americans have. · Age, especially if you are older than 72. · Digestive problems, such as Crohn's or celiac disease.   · Liver and kidney disease. Some people who do not get enough vitamin D may need supplements. Are there any risks from taking vitamin D?  · Too much vitamin D:  ¨ Can damage your kidneys. ¨ Can cause nausea and vomiting, constipation, and weakness. ¨ Raises the amount of calcium in your blood. If this happens, you can get confused or have an irregular heart rhythm. · Vitamin D may interact with other medicines. Tell your doctor about all of the medicines you take, including over-the-counter drugs, herbs, and pills. Tell your doctor about all of your current medical problems. Where can you learn more? Go to http://nickiLiveAir Networksrhonda.info/. Enter [id number] in the search box to learn more about \"Learning About Vitamin D.\"  Current as of: May 12, 2017  Content Version: 11.4  © 0266-6785 Healthwise, Incorporated. Care instructions adapted under license by [employer] (which disclaims liability or warranty for this information). If you have questions about a medical condition or this instruction, always ask your healthcare professional. [address] any warranty or liability for your use of this information.

## 2017-11-08 NOTE — PROGRESS NOTES
Chief Complaint   Patient presents with    Follow-up     [facility] patient, follow up quadriplegia , mood diorder, pre-dm       Reviewed Record in preparation for visit and have obtained necessary documentation. Identified pt with two pt identifiers (Name @ )    Health Maintenance Due   Topic    HEMOGLOBIN A1C Q6M          1. Have you been to the ER, urgent care clinic since your last visit? Hospitalized since your last visit? No    2. Have you seen or consulted any other health care providers outside of the [address] [place] [address] since your last visit? Include any pap smears or colon screening.  No

## 2017-11-08 NOTE — PROGRESS NOTES
HISTORY OF PRESENT ILLNESS  John Nguyen is a 49 y.o. male. Blood pressure 110/72, pulse 67, temperature 98.7 °F (37.1 °C), temperature source Oral, resp. rate 16, height 5' 8\" (1.727 m), weight 130 lb (59 kg), SpO2 97 %. Body mass index is 19.77 kg/(m^2). Chief Complaint   Patient presents with    Follow-up     [facility], follow up quadriplegia , mood diorder, pre-dm        HPI  John Nguyen 49 y.o. male  presents to the office today for follow up on chronic conditions. Pt presents with caregiver at bedside. Vitamin D deficiency: Pt's vitamin D levels were 45 on 05/21/17. Pt continues with 50,000 units vitamin D weekly. Presumed stable, will assess levels today. Prediabetes: A1c was 5.1% on 05/17/17. Pt continues with diet monitoring. Advised pt that we will check his A1c today. Presumed stable, will assess levels today. Health maintenance: Pt's caregiver states that pt does not have any current issues. Pt is followed by Dr. Lisa Presley ([facility]). Pt denies any unusual tiredness, chest pains, shortness of breath, headaches, urinary issues. Current Outpatient Prescriptions   Medication Sig Dispense Refill    B.infantis-B.ani-B.long-B.bifi (PROBIOTIC 4X) 10-15 mg TbEC Take  by mouth.  magnesium hydroxide (GENAO MILK OF MAGNESIA) 400 mg/5 mL suspension Take 30 mL by mouth daily as needed for Constipation.  hydrocortisone (HYTONE) 2.5 % topical cream Apply  to affected area two (2) times a day. use thin layer      omeprazole (PRILOSEC) 40 mg capsule Take 40 mg by mouth daily.  senna (SENEXON) 8.6 mg tablet Take 1 Tab by mouth two (2) times a day.  melatonin 3 mg tablet Take 1 tablet by mouth 2 hours prior to bedtime      trospium (SANCTURA) 20 mg tablet Take  by mouth two (2) times a day.  clonazePAM (KLONOPIN) 0.5 mg tablet Take  by mouth two (2) times a day.  LACTOSE-REDUCED FOOD (BOOST HIGH PROTEIN PO) Take  by mouth.  Boost Chocolate Flavor Drink contents of 1 ml three times daily for weight loss      bisacodyl (BISAC-EVAC) 10 mg suppository Insert 10 mg into rectum nightly.  bromocriptine (PARLODEL) 2.5 mg tablet Take 2.5 mg by mouth two (2) times a day.  lithium carbonate SR (LITHOBID) 300 mg CR tablet Take 300 mg by mouth four (4) times daily.  multivit with min-FA-lycopene (ONE-A-DAY MEN'S MULTIVITAMIN) 400-300 mcg tab Take  by mouth.  traZODone (DESYREL) 300 mg tablet Take 300 mg by mouth nightly.  propranolol (INDERAL) 80 mg tablet Take  by mouth two (2) times a day. Hold if systolic  Pressure is below 100      LORazepam (ATIVAN) 2 mg tablet Take  by mouth every eight (8) hours as needed for Anxiety.  eszopiclone (LUNESTA) 2 mg tablet Take 3 mg by mouth nightly.  ergocalciferol (VITAMIN D) 50,000 unit capsule Take 50,000 Units by mouth every seven (7) days. On Mondays      budesonide (RHINOCORT AQUA) 32 mcg/Actuation nasal spray 2 Sprays by Both Nostrils route two (2) times a day.  OTHER Take 1 Tab by mouth daily.  ALPRAZolam (XANAX) 0.5 mg tablet Take  by mouth. Take 1 tablet by mouth 30 minutes prior to CT scan for sedation      docusate sodium (COLACE) 100 mg capsule Take 100 mg by mouth two (2) times a day.  lansoprazole (PREVACID) 30 mg capsule Take 30 mg by mouth Daily (before breakfast). Allergies   Allergen Reactions    Invega [Paliperidone] Unknown (comments)     Past Medical History:   Diagnosis Date    DM (diabetes mellitus) ([facility] [address] 8/10/2010    father says no D. 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 HX OTHER SURGICAL  4/12/15    ANTONIO, exp lap, sm bowel resection    HX OTHER SURGICAL  4/15/15    exp lap with wash out      Family History   Problem Relation Age of Onset    Cancer Father      Waldenstroms macroglobulinemia      Social History   Substance Use Topics    Smoking status: Never Smoker    Smokeless tobacco: Never Used    Alcohol use No      Comment: prior alcoholic        Review of Systems   Unable to perform ROS: Mental acuity       Physical Exam   Constitutional: He is oriented to person, place, and time. He appears well-developed and well-nourished. HENT:   Head: Normocephalic and atraumatic. Neck: Carotid bruit is not present. Cardiovascular: Normal rate, regular rhythm, normal heart sounds and intact distal pulses. Exam reveals no gallop and no friction rub. No murmur heard. Pulmonary/Chest: Effort normal and breath sounds normal. No respiratory distress. He has no wheezes. He has no rales. He exhibits no tenderness. Neurological: He is alert and oriented to person, place, and time. Psychiatric: He has a normal mood and affect. His behavior is normal. Judgment and thought content normal.   Nursing note and vitals reviewed. ASSESSMENT and PLAN  Diagnoses and all orders for this visit:    1. Prediabetes  -     METABOLIC PANEL, COMPREHENSIVE  -     LIPID PANEL  -     HEMOGLOBIN A1C WITH EAG  - A1c was 5.1% on 05/17/17. Pt continues with diet monitoring. Advised pt that we will check his A1c today. Presumed stable, will assess levels today. 2. Vitamin D deficiency  -     VITAMIN D, 25 HYDROXY  - Pt's vitamin D levels were 45 on 05/21/17. Pt continues with 50,000 units vitamin D weekly. Presumed stable, will assess levels today. 3. Quadriplegia ([address]        - Stable, continue current regimen. Assessment & Plan:    Lab Results   Component Value Date/Time    WBC CANCELED 04/25/2017 12:00 PM    HGB CANCELED 04/25/2017 12:00 PM    HCT CANCELED 04/25/2017 12:00 PM    PLATELET CANCELED 21/49/4627 12:00 PM    Creatinine 0.55 05/17/2017 08:11 AM    BUN 11 05/17/2017 08:11 AM    Potassium 4.8 05/17/2017 08:11 AM         4. Malaise  -     CBC WITH AUTOMATED DIFF  -     TSH 3RD GENERATION  -     T4, FREE  - Presumed stable, will assess levels today. 5. Screening PSA (prostate specific antigen)  -     PSA W/ REFLX FREE PSA  - Presumed stable, will assess levels today. Follow-up Disposition:  Return in about 6 months (around 5/8/2018) for chronic follow up. Medication risks/benefits/costs/interactions/alternatives discussed with patient. Advised patient to call back or return to office if symptoms worsen/change/persist.  If patient cannot reach us or should anything more severe/urgent arise he/she should proceed directly to the nearest emergency department. Discussed expected course/resolution/complications of diagnosis in detail with patient. Patient given a written after visit summary which includes her diagnoses, current medications and vitals. Patient expressed understanding with the diagnosis and plan. Written by stephen Parisi, as dictated by Alphonso Olsen M.D.   I have reviewed and agree with the above note and have made corrections where appropriate, Dr. Gayle Lloyd MD

## 2017-11-08 NOTE — ASSESSMENT & PLAN NOTE
Lab Results   Component Value Date/Time    WBC CANCELED 04/25/2017 12:00 PM    HGB CANCELED 04/25/2017 12:00 PM    HCT CANCELED 04/25/2017 12:00 PM    PLATELET CANCELED 51/39/0969 12:00 PM    Creatinine 0.55 05/17/2017 08:11 AM    BUN 11 05/17/2017 08:11 AM    Potassium 4.8 05/17/2017 08:11 AM

## 2017-11-09 LAB
25(OH)D3+25(OH)D2 SERPL-MCNC: 33.4 NG/ML (ref 30–100)
ALBUMIN SERPL-MCNC: 4.7 G/DL (ref 3.5–5.5)
ALBUMIN/GLOB SERPL: 2.6 {RATIO} (ref 1.2–2.2)
ALP SERPL-CCNC: 89 IU/L (ref 39–117)
ALT SERPL-CCNC: 19 IU/L (ref 0–44)
AST SERPL-CCNC: 12 IU/L (ref 0–40)
BASOPHILS # BLD AUTO: 0.1 X10E3/UL (ref 0–0.2)
BASOPHILS NFR BLD AUTO: 0 %
BILIRUB SERPL-MCNC: 0.3 MG/DL (ref 0–1.2)
BUN SERPL-MCNC: 16 MG/DL (ref 6–24)
BUN/CREAT SERPL: 23 (ref 9–20)
CALCIUM SERPL-MCNC: 9.4 MG/DL (ref 8.7–10.2)
CHLORIDE SERPL-SCNC: 102 MMOL/L (ref 96–106)
CHOLEST SERPL-MCNC: 180 MG/DL (ref 100–199)
CO2 SERPL-SCNC: 20 MMOL/L (ref 18–29)
CREAT SERPL-MCNC: 0.7 MG/DL (ref 0.76–1.27)
EOSINOPHIL # BLD AUTO: 0.4 X10E3/UL (ref 0–0.4)
EOSINOPHIL NFR BLD AUTO: 3 %
ERYTHROCYTE [DISTWIDTH] IN BLOOD BY AUTOMATED COUNT: 14.1 % (ref 12.3–15.4)
EST. AVERAGE GLUCOSE BLD GHB EST-MCNC: 100 MG/DL
GFR SERPLBLD CREATININE-BSD FMLA CKD-EPI: 112 ML/MIN/1.73
GFR SERPLBLD CREATININE-BSD FMLA CKD-EPI: 129 ML/MIN/1.73
GLOBULIN SER CALC-MCNC: 1.8 G/DL (ref 1.5–4.5)
GLUCOSE SERPL-MCNC: 99 MG/DL (ref 65–99)
HBA1C MFR BLD: 5.1 % (ref 4.8–5.6)
HCT VFR BLD AUTO: 45 % (ref 37.5–51)
HDLC SERPL-MCNC: 57 MG/DL
HGB BLD-MCNC: 15 G/DL (ref 12.6–17.7)
IMM GRANULOCYTES # BLD: 0 X10E3/UL (ref 0–0.1)
IMM GRANULOCYTES NFR BLD: 0 %
INTERPRETATION, 910389: NORMAL
LDLC SERPL CALC-MCNC: 88 MG/DL (ref 0–99)
LYMPHOCYTES # BLD AUTO: 2.2 X10E3/UL (ref 0.7–3.1)
LYMPHOCYTES NFR BLD AUTO: 19 %
MCH RBC QN AUTO: 29.7 PG (ref 26.6–33)
MCHC RBC AUTO-ENTMCNC: 33.3 G/DL (ref 31.5–35.7)
MCV RBC AUTO: 89 FL (ref 79–97)
MONOCYTES # BLD AUTO: 1 X10E3/UL (ref 0.1–0.9)
MONOCYTES NFR BLD AUTO: 9 %
NEUTROPHILS # BLD AUTO: 7.9 X10E3/UL (ref 1.4–7)
NEUTROPHILS NFR BLD AUTO: 69 %
PLATELET # BLD AUTO: 327 X10E3/UL (ref 150–379)
POTASSIUM SERPL-SCNC: 5.1 MMOL/L (ref 3.5–5.2)
PROT SERPL-MCNC: 6.5 G/DL (ref 6–8.5)
PSA SERPL-MCNC: 0.9 NG/ML (ref 0–4)
RBC # BLD AUTO: 5.05 X10E6/UL (ref 4.14–5.8)
REFLEX CRITERIA: NORMAL
SODIUM SERPL-SCNC: 138 MMOL/L (ref 134–144)
T4 FREE SERPL-MCNC: 1.41 NG/DL (ref 0.82–1.77)
TRIGL SERPL-MCNC: 177 MG/DL (ref 0–149)
TSH SERPL DL<=0.005 MIU/L-ACNC: 2.2 UIU/ML (ref 0.45–4.5)
VLDLC SERPL CALC-MCNC: 35 MG/DL (ref 5–40)
WBC # BLD AUTO: 11.4 X10E3/UL (ref 3.4–10.8)

## 2017-11-22 ENCOUNTER — OFFICE VISIT (OUTPATIENT)
Dept: FAMILY MEDICINE CLINIC | Age: 47
End: 2017-11-22

## 2017-11-22 VITALS
BODY MASS INDEX: 19.7 KG/M2 | TEMPERATURE: 98 F | HEART RATE: 63 BPM | WEIGHT: 130 LBS | SYSTOLIC BLOOD PRESSURE: 101 MMHG | OXYGEN SATURATION: 95 % | DIASTOLIC BLOOD PRESSURE: 70 MMHG | RESPIRATION RATE: 18 BRPM | HEIGHT: 68 IN

## 2017-11-22 DIAGNOSIS — Z01.818 PRE-OP EXAM: ICD-10-CM

## 2017-11-22 DIAGNOSIS — M21.962: Primary | ICD-10-CM

## 2017-11-22 DIAGNOSIS — S06.9X0A TRAUMATIC BRAIN INJURY, WITHOUT LOSS OF CONSCIOUSNESS, INITIAL ENCOUNTER (HCC): ICD-10-CM

## 2017-11-22 DIAGNOSIS — R73.03 PREDIABETES: ICD-10-CM

## 2017-11-22 PROBLEM — S06.9XAA TBI (TRAUMATIC BRAIN INJURY): Status: ACTIVE | Noted: 2017-11-22

## 2017-11-22 NOTE — PATIENT INSTRUCTIONS
Learning About a Closed Head Injury  What is a closed head injury? A closed head injury happens when your head gets hit hard. The strong force of the blow causes your brain to shake in your skull. This movement can cause the brain to bruise, swell, or tear. Sometimes nerves or blood vessels also get damaged. This can cause bleeding in or around the brain. A concussion is a type of closed head injury. What are the symptoms? If you have a mild concussion, you may have a mild headache or feel \"not quite right. \" These symptoms are common. They usually go away over a few days to 4 weeks. But sometimes after a concussion, you feel like you can't function as well as before the injury. And you have new symptoms. This is called postconcussive syndrome. You may:  · Find it harder to solve problems, think, concentrate, or remember. · Have headaches. · Have changes in your sleep patterns, such as not being able to sleep or sleeping all the time. · Have changes in your personality. · Not be interested in your usual activities. · Feel angry or anxious without a clear reason. · Lose your sense of taste or smell. · Be dizzy, lightheaded, or unsteady. It may be hard to stand or walk. How is a closed head injury treated? Any person who may have a concussion needs to see a doctor. Some people have to stay in the hospital to be watched. Others can go home safely. If you go home, follow your doctor's instructions. He or she will tell you if you need someone to watch you closely for the next 24 hours or longer. Rest is the best treatment. Get plenty of sleep at night. And try to rest during the day. · Avoid activities that are physically or mentally demanding. These include housework, exercise, and schoolwork. And don't play video games, send text messages, or use the computer. You may need to change your school or work schedule to be able to avoid these activities.   · Ask your doctor when it's okay to drive, ride a bike, or operate machinery. · Take an over-the-counter pain medicine, such as acetaminophen (Tylenol), ibuprofen (Advil, Motrin), or naproxen (Aleve). Be safe with medicines. Read and follow all instructions on the label. · Check with your doctor before you use any other medicines for pain. · Do not drink alcohol or use illegal drugs. They can slow recovery. They can also increase your risk of getting a second head injury. Follow-up care is a key part of your treatment and safety. Be sure to make and go to all appointments, and call your doctor if you are having problems. It's also a good idea to know your test results and keep a list of the medicines you take. Where can you learn more? Go to http://nicki-rhonda.info/. Enter E235 in the search box to learn more about \"Learning About a Closed Head Injury. \"  Current as of: October 14, 2016  Content Version: 11.4  © 0535-7482 Healthwise, Incorporated. Care instructions adapted under license by [employer] (which disclaims liability or warranty for this information). If you have questions about a medical condition or this instruction, always ask your healthcare professional. [address] any warranty or liability for your use of this information.

## 2017-11-22 NOTE — PROGRESS NOTES
Chief Complaint   Patient presents with    Pre-op Exam     [facility]      1. Have you been to the ER, urgent care clinic since your last visit? Hospitalized since your last visit? No    2. Have you seen or consulted any other health care providers outside of the [address] [place] [address] since your last visit? Include any pap smears or colon screening.  No

## 2017-11-22 NOTE — MR AVS SNAPSHOT
Visit Information Date & Time Provider Department Dept. Phone Encounter #  
 11/22/2017 11:45 AM Tray Blandon MD [address] [place] [address] Road [phone] [id number] Follow-up Instructions Return if symptoms worsen or fail to improve, for chronic follow up. Your Appointments 5/9/2018 10:00 AM  
ROUTINE CARE with Tray Blandon MD  
[facility]) Appt Note: 6 months follow up visit chronic follow up  
 [address] [id number]  
[phone]  
  
   
 [address] [id number] Upcoming Health Maintenance Date Due HEMOGLOBIN A1C Q6M 5/8/2018 LIPID PANEL Q1 11/8/2018 DTaP/Tdap/Td series (2 - Td) 2/22/2024 Allergies as of 11/22/2017  Review Complete On: 11/22/2017 By: Niles Levy Severity Noted Reaction Type Reactions Invega [Paliperidone]  11/16/2011    Unknown (comments) Current Immunizations  Reviewed on 11/7/2017 Name Date Influenza Vaccine 10/16/2017, 12/22/2015, 11/7/2013 Influenza Vaccine (Quad) PF 11/7/2016 10:35 AM  
 Pneumococcal Polysaccharide (PPSV-23) 5/4/2015  2:30 PM  
 Tdap 2/22/2014  9:48 AM, 11/7/2013 Not reviewed this visit You Were Diagnosed With   
  
 Codes Comments Deformity of left talus    -  Primary ICD-10-CM: S74.300 ICD-9-CM: 736.70 Prediabetes     ICD-10-CM: R73.03 
ICD-9-CM: 790.29 Traumatic brain injury, without loss of consciousness, initial encounter ([facility]     ICD-10-CM: H14.4L7R 
ICD-9-CM: 854.01 Pre-op exam     ICD-10-CM: X55.574 ICD-9-CM: V72.84 Vitals BP Pulse Temp Resp Height(growth percentile) Weight(growth percentile) 101/70 (BP 1 Location: Right arm, BP Patient Position: Sitting) 63 98 °F (36.7 °C) (Oral) 18 5' 8\" (1.727 m) 130 lb (59 kg) SpO2 BMI Smoking Status 95% 19.77 kg/m2 Never Smoker BMI and BSA Data Body Mass Index Body Surface Area 19.77 kg/m 2 1.68 m 2 Preferred Pharmacy Pharmacy Name Phone [address]  [phone] Your Updated Medication List  
  
   
This list is accurate as of: 11/22/17  1:00 PM.  Always use your most recent med list.  
  
  
  
  
 BISAC-EVAC 10 mg suppository Generic drug:  bisacodyl Insert 10 mg into rectum nightly. BOOST HIGH PROTEIN PO Take  by mouth. Boost Chocolate Flavor Drink contents of 1 ml three times daily for weight loss  
  
 bromocriptine 2.5 mg tablet Commonly known as:  PARLODEL Take 2.5 mg by mouth two (2) times a day. clonazePAM 0.5 mg tablet Commonly known as:  Bri Barajas Take  by mouth two (2) times a day. docusate sodium 100 mg capsule Commonly known as:  Yimi El Take 100 mg by mouth two (2) times a day. hydrocortisone 2.5 % topical cream  
Commonly known as:  HYTONE Apply  to affected area two (2) times a day. use thin layer  
  
 lithium carbonate  mg CR tablet Commonly known as:  Judeth Lynne Take 300 mg by mouth four (4) times daily. LORazepam 2 mg tablet Commonly known as:  ATIVAN Take  by mouth every eight (8) hours as needed for Anxiety. LUNESTA 2 mg tablet Generic drug:  eszopiclone Take 3 mg by mouth nightly. melatonin 3 mg tablet Take 1 tablet by mouth 2 hours prior to bedtime  
  
 omeprazole 40 mg capsule Commonly known as:  PRILOSEC Take 40 mg by mouth daily. ONE-A-DAY MEN'S MULTIVITAMIN 400- mcg Tab Generic drug:  multivit-min-folic-vit K-lycop Take  by mouth. OTHER Take 1 Tab by mouth daily. GENAO MILK OF MAGNESIA 400 mg/5 mL suspension Generic drug:  magnesium hydroxide Take 30 mL by mouth daily as needed for Constipation. PREVACID 30 mg capsule Generic drug:  lansoprazole Take 30 mg by mouth Daily (before breakfast). PROBIOTIC 4X 10-15 mg Tbec Generic drug:  B.infantis-B.ani-B.long-B.bifi Take  by mouth.  
  
 propranolol 80 mg tablet Commonly known as:  INDERAL Take  by mouth two (2) times a day. Hold if systolic  Pressure is below 100 RHINOCORT AQUA 32 mcg/actuation nasal spray Generic drug:  budesonide 2 Sprays by Both Nostrils route two (2) times a day. SANCTURA 20 mg tablet Generic drug:  trospium Take  by mouth two (2) times a day. SENEXON 8.6 mg tablet Generic drug:  senna Take 1 Tab by mouth two (2) times a day. traZODone 300 mg tablet Commonly known as:  Thom Lipps Take 300 mg by mouth nightly. VITAMIN D2 50,000 unit capsule Generic drug:  ergocalciferol Take 50,000 Units by mouth every seven (7) days. On Mondays XANAX 0.5 mg tablet Generic drug:  ALPRAZolam  
Take  by mouth. Take 1 tablet by mouth 30 minutes prior to CT scan for sedation Follow-up Instructions Return if symptoms worsen or fail to improve, for chronic follow up. To-Do List   
 11/27/2017 10:30 AM  
  Appointment with [facility] PAT EXAM RM 4 at [address] [facility] ([phone]) Patient Instructions Learning About a Closed Head Injury What is a closed head injury? A closed head injury happens when your head gets hit hard. The strong force of the blow causes your brain to shake in your skull. This movement can cause the brain to bruise, swell, or tear. Sometimes nerves or blood vessels also get damaged. This can cause bleeding in or around the brain. A concussion is a type of closed head injury. What are the symptoms? If you have a mild concussion, you may have a mild headache or feel \"not quite right. \" These symptoms are common. They usually go away over a few days to 4 weeks. But sometimes after a concussion, you feel like you can't function as well as before the injury. And you have new symptoms. This is called postconcussive syndrome. You may: · Find it harder to solve problems, think, concentrate, or remember. · Have headaches. · Have changes in your sleep patterns, such as not being able to sleep or sleeping all the time. · Have changes in your personality. · Not be interested in your usual activities. · Feel angry or anxious without a clear reason. · Lose your sense of taste or smell. · Be dizzy, lightheaded, or unsteady. It may be hard to stand or walk. How is a closed head injury treated? Any person who may have a concussion needs to see a doctor. Some people have to stay in the hospital to be watched. Others can go home safely. If you go home, follow your doctor's instructions. He or she will tell you if you need someone to watch you closely for the next 24 hours or longer. Rest is the best treatment. Get plenty of sleep at night. And try to rest during the day. · Avoid activities that are physically or mentally demanding. These include housework, exercise, and schoolwork. And don't play video games, send text messages, or use the computer. You may need to change your school or work schedule to be able to avoid these activities. · Ask your doctor when it's okay to drive, ride a bike, or operate machinery. · Take an over-the-counter pain medicine, such as acetaminophen (Tylenol), ibuprofen (Advil, Motrin), or naproxen (Aleve). Be safe with medicines. Read and follow all instructions on the label. · Check with your doctor before you use any other medicines for pain. · Do not drink alcohol or use illegal drugs. They can slow recovery. They can also increase your risk of getting a second head injury. Follow-up care is a key part of your treatment and safety. Be sure to make and go to all appointments, and call your doctor if you are having problems. It's also a good idea to know your test results and keep a list of the medicines you take. Where can you learn more? Go to http://nicki-rhonda.info/. Enter E235 in the search box to learn more about \"Learning About a Closed Head Injury. \" Current as of: October 14, 2016 Content Version: 11.4 © 9544-4608 [employer]. Care instructions adapted under license by [employer] (which disclaims liability or warranty for this information). If you have questions about a medical condition or this instruction, always ask your healthcare professional. [address] any warranty or liability for your use of this information. Introducing [facility]! Dear Cyndie Loepz: Thank you for requesting a [employer] account. Our records indicate that you already have an active [employer] account. You can access your account anytime at [email or web address] Did you know that you can access your hospital and ER discharge instructions at any time in [employer]? You can also review all of your test results from your hospital stay or ER visit. Additional Information If you have questions, please visit the Frequently Asked Questions section of the [employer] website at [email or web address] Remember, [employer] is NOT to be used for urgent needs. For medical emergencies, dial 911. Now available from your iPhone and Android! Please provide this summary of care documentation to your next provider. Your primary care clinician is listed as Willard Montana. If you have any questions after today's visit, please call [phone].

## 2017-11-22 NOTE — PROGRESS NOTES
HISTORY OF PRESENT ILLNESS  Stacy Lewis is a 49 y.o. male. Blood pressure 101/70, pulse 63, temperature 98 °F (36.7 °C), temperature source Oral, resp. rate 18, height 5' 8\" (1.727 m), weight 130 lb (59 kg), SpO2 95 %. Body mass index is 19.77 kg/(m^2). Chief Complaint   Patient presents with    Pre-op Exam     [facility] life         HPI  Stacy Lewis 49 y.o. male  presents to the office today for a pre-op exam. Pt is a [facility] of life pt followed by Dr. Kike Paniagua (Physiatrist). Pt presents with caregiver at bedside. Pre-op: Pt is scheduled to have surgery on his left talus with Dr. Lyly Sanabria (Orthopedic surgery) on 11/30/17. Pt denies any headaches, chest pains, shortness of breath, or dizziness. Current Outpatient Prescriptions   Medication Sig Dispense Refill    B.infantis-B.ani-B.long-B.bifi (PROBIOTIC 4X) 10-15 mg TbEC Take  by mouth.  senna (SENEXON) 8.6 mg tablet Take 1 Tab by mouth two (2) times a day.  melatonin 3 mg tablet Take 1 tablet by mouth 2 hours prior to bedtime      trospium (SANCTURA) 20 mg tablet Take  by mouth two (2) times a day.  ALPRAZolam (XANAX) 0.5 mg tablet Take  by mouth. Take 1 tablet by mouth 30 minutes prior to CT scan for sedation      clonazePAM (KLONOPIN) 0.5 mg tablet Take  by mouth two (2) times a day.  bisacodyl (BISAC-EVAC) 10 mg suppository Insert 10 mg into rectum nightly.  bromocriptine (PARLODEL) 2.5 mg tablet Take 2.5 mg by mouth two (2) times a day.  docusate sodium (COLACE) 100 mg capsule Take 100 mg by mouth two (2) times a day.  lithium carbonate SR (LITHOBID) 300 mg CR tablet Take 300 mg by mouth four (4) times daily.  traZODone (DESYREL) 300 mg tablet Take 300 mg by mouth nightly.  propranolol (INDERAL) 80 mg tablet Take  by mouth two (2) times a day. Hold if systolic  Pressure is below 100      eszopiclone (LUNESTA) 2 mg tablet Take 3 mg by mouth nightly.       ergocalciferol (VITAMIN D) 50,000 unit capsule Take 50,000 Units by mouth every seven (7) days. On Mondays      budesonide (RHINOCORT AQUA) 32 mcg/Actuation nasal spray 2 Sprays by Both Nostrils route two (2) times a day.  magnesium hydroxide (GENAO MILK OF MAGNESIA) 400 mg/5 mL suspension Take 30 mL by mouth daily as needed for Constipation.  hydrocortisone (HYTONE) 2.5 % topical cream Apply  to affected area two (2) times a day. use thin layer      omeprazole (PRILOSEC) 40 mg capsule Take 40 mg by mouth daily.  OTHER Take 1 Tab by mouth daily.  LACTOSE-REDUCED FOOD (BOOST HIGH PROTEIN PO) Take  by mouth. Boost Chocolate Flavor Drink contents of 1 ml three times daily for weight loss      multivit with min-FA-lycopene (ONE-A-DAY MEN'S MULTIVITAMIN) 400-300 mcg tab Take  by mouth.  LORazepam (ATIVAN) 2 mg tablet Take  by mouth every eight (8) hours as needed for Anxiety.  lansoprazole (PREVACID) 30 mg capsule Take 30 mg by mouth Daily (before breakfast). 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 HX OTHER SURGICAL  4/12/15    ANTONIO, exp lap, sm bowel resection    HX OTHER SURGICAL  4/15/15    exp lap with wash out Family History   Problem Relation Age of Onset    Cancer Father      Waldenstroms macroglobulinemia      Social History   Substance Use Topics    Smoking status: Never Smoker    Smokeless tobacco: Never Used    Alcohol use No      Comment: prior alcoholic        Review of Systems   Unable to perform ROS: Mental acuity       Physical Exam   Constitutional: He appears well-developed and well-nourished. HENT:   Head: Normocephalic and atraumatic. Right Ear: Hearing, tympanic membrane, external ear and ear canal normal.   Left Ear: Hearing, tympanic membrane, external ear and ear canal normal.   Nose: Nose normal.   Neck: Carotid bruit is not present. Cardiovascular: Normal rate, regular rhythm, normal heart sounds and intact distal pulses. Exam reveals no gallop and no friction rub. No murmur heard. Pulmonary/Chest: Effort normal and breath sounds normal. No respiratory distress. He has no wheezes. He has no rales. He exhibits no tenderness. Neurological: He is alert. Psychiatric: He has a normal mood and affect. His behavior is normal. Judgment and thought content normal.   Nursing note and vitals reviewed.     EXAM:        Visit Vitals    /70 (BP 1 Location: Right arm, BP Patient Position: Sitting)    Pulse 63    Temp 98 °F (36.7 °C) (Oral)    Resp 18    Ht 5' 8\" (1.727 m)    Wt 130 lb (59 kg)    SpO2 95%    BMI 19.77 kg/m2      General appearance - alert, well appearing, and in no distress  Mental status - Alert  Eyes - pupils equal and reactive, extraocular eye movements intact  Ears - bilateral TM's and external ear canals normal  Nose - normal and patent, no erythema, discharge or polyps  Mouth - mucous membranes moist, pharynx normal without lesions  Neck - supple, no significant adenopathy  Chest - clear to auscultation, no wheezes, rales or rhonchi, symmetric air entry  Heart - normal rate, regular rhythm, normal S1, S2, no murmurs, rubs, clicks or gallops  Abdomen - soft, nontender, nondistended, no masses or organomegaly  Neurological - alert  Extremities - peripheral pulses normal, no pedal edema, no clubbing or cyanosis      ASSESSMENT and PLAN  Diagnoses and all orders for this visit:    1. Deformity of left talus       - Pt is scheduled to have surgery with Dr. Sonal Sprague (Orthopedic surgeon) on 11/30/17. 2. Prediabetes       - Stable, continue current regimen. 3. Traumatic brain injury, without loss of consciousness, initial encounter ([facility] [address]       - Pt is a [facility] patient followed by Dr. Ildefonso Riddle (Physiatrist). 4. Pre-op exam       - Completed and cleared patient for surgery. Follow-up Disposition:  Return if symptoms worsen or fail to improve, for chronic follow up. Medication risks/benefits/costs/interactions/alternatives discussed with patient. Advised patient to call back or return to office if symptoms worsen/change/persist.  If patient cannot reach us or should anything more severe/urgent arise he/she should proceed directly to the nearest emergency department. Discussed expected course/resolution/complications of diagnosis in detail with patient. Patient given a written after visit summary which includes her diagnoses, current medications and vitals. Patient expressed understanding with the diagnosis and plan. Written by stephen Mccracken, as dictated by Clara Solorio M.D.   I have reviewed and agree with the above note and have made corrections where appropriate, Dr. Corinne Buttery, MD

## 2017-11-22 NOTE — PROGRESS NOTES
Preoperative Evaluation    Date of Exam: 2017    Jeimy Coulter is a 49 y.o. male (:1970) who presents for preoperative evaluation. Procedure/Surgery:NAOMI and SPLATT  Date of Procedure/Surgery: 17  Surgeon: Dr. 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  senna (SENEXON) 8.6 mg tablet Take 1 Tab by mouth two (2) times a day.  melatonin 3 mg tablet Take 1 tablet by mouth 2 hours prior to bedtime    trospium (SANCTURA) 20 mg tablet Take  by mouth two (2) times a day.  ALPRAZolam (XANAX) 0.5 mg tablet Take  by mouth.  Take 1 tablet by mouth 30 minutes prior to CT scan for sedation    clonazePAM (KLONOPIN) 0.5 mg tablet Take  by mouth two (2) times a day.  bisacodyl (BISAC-EVAC) 10 mg suppository Insert 10 mg into rectum nightly.  bromocriptine (PARLODEL) 2.5 mg tablet Take 2.5 mg by mouth two (2) times a day.  docusate sodium (COLACE) 100 mg capsule Take 100 mg by mouth two (2) times a day.  lithium carbonate SR (LITHOBID) 300 mg CR tablet Take 300 mg by mouth four (4) times daily.  traZODone (DESYREL) 300 mg tablet Take 300 mg by mouth nightly.  propranolol (INDERAL) 80 mg tablet Take  by mouth two (2) times a day. Hold if systolic  Pressure is below 100    eszopiclone (LUNESTA) 2 mg tablet Take 3 mg by mouth nightly.  ergocalciferol (VITAMIN D) 50,000 unit capsule Take 50,000 Units by mouth every seven (7) days. On Mondays    budesonide (RHINOCORT AQUA) 32 mcg/Actuation nasal spray 2 Sprays by Both Nostrils route two (2) times a day.  magnesium hydroxide (GENAO MILK OF MAGNESIA) 400 mg/5 mL suspension Take 30 mL by mouth daily as needed for Constipation.  hydrocortisone (HYTONE) 2.5 % topical cream Apply  to affected area two (2) times a day. use thin layer    omeprazole (PRILOSEC) 40 mg capsule Take 40 mg by mouth daily.  OTHER Take 1 Tab by mouth daily.  LACTOSE-REDUCED FOOD (BOOST HIGH PROTEIN PO) Take  by mouth. Boost Chocolate Flavor Drink contents of 1 ml three times daily for weight loss    multivit with min-FA-lycopene (ONE-A-DAY MEN'S MULTIVITAMIN) 400-300 mcg tab Take  by mouth.  LORazepam (ATIVAN) 2 mg tablet Take  by mouth every eight (8) hours as needed for Anxiety.  lansoprazole (PREVACID) 30 mg capsule Take 30 mg by mouth Daily (before breakfast). No current facility-administered medications for this visit.       Surgical History:     Past Surgical History:   Procedure Laterality Date    ABDOMEN SURGERY PROC UNLISTED      \"Ulcer surgery\"/Implantable Baclofen pump    COLONOSCOPY N/A 4/28/2017    COLONOSCOPY / EGD  performed by Ginette Knight [facility] at [facility] ENDOSCOPY    HX OTHER SURGICAL  4/12/15    ANTONIO, exp lap, sm bowel resection    HX OTHER SURGICAL  4/15/15    exp lap with wash out      Social History:     Social History     Social History    Marital status: SINGLE     Spouse name: N/A    Number of children: N/A    Years of education: N/A     Social History Main Topics    Smoking status: Never Smoker    Smokeless tobacco: Never Used    Alcohol use No      Comment: prior alcoholic    Drug use: No    Sexual activity: Not Asked     Other Topics Concern    None     Social History Narrative       Recent use of: No recent use of aspirin (ASA), NSAIDS or steroids        Anesthesia Complications: None  History of abnormal bleeding : None  History of Blood Transfusions: no  Health Care Directive or Living Will: yes    REVIEW OF SYSTEMS:  A comprehensive review of systems was negative.     EXAM:   Visit Vitals    /70 (BP 1 Location: Right arm, BP Patient Position: Sitting)    Pulse 63    Temp 98 °F (36.7 °C) (Oral)    Resp 18    Ht 5' 8\" (1.727 m)    Wt 130 lb (59 kg)    SpO2 95%    BMI 19.77 kg/m2     General appearance - alert, well appearing, and in no distress  Mental status - Alert  Eyes - pupils equal and reactive, extraocular eye movements intact  Ears - bilateral TM's and external ear canals normal  Nose - normal and patent, no erythema, discharge or polyps  Mouth - mucous membranes moist, pharynx normal without lesions  Neck - supple, no significant adenopathy  Chest - clear to auscultation, no wheezes, rales or rhonchi, symmetric air entry  Heart - normal rate, regular rhythm, normal S1, S2, no murmurs, rubs, clicks or gallops  Abdomen - soft, nontender, nondistended, no masses or organomegaly  Neurological - alert  Extremities - peripheral pulses normal, no pedal edema, no clubbing or cyanosis          IMPRESSION:   None  No contraindications to planned surgery    Terrell Faria MD   11/22/2017

## 2017-11-27 ENCOUNTER — HOSPITAL ENCOUNTER (OUTPATIENT)
Dept: PREADMISSION TESTING | Age: 47
Discharge: HOME | End: 2017-11-27
Payer: COMMERCIAL

## 2017-11-27 ENCOUNTER — HOSPITAL ENCOUNTER (OUTPATIENT)
Dept: GENERAL RADIOLOGY | Age: 47
Discharge: HOME | End: 2017-11-27
Payer: MEDICARE

## 2017-11-27 VITALS
HEIGHT: 68 IN | HEART RATE: 61 BPM | WEIGHT: 134 LBS | BODY MASS INDEX: 20.31 KG/M2 | DIASTOLIC BLOOD PRESSURE: 71 MMHG | TEMPERATURE: 98.5 F | SYSTOLIC BLOOD PRESSURE: 108 MMHG

## 2017-11-27 DIAGNOSIS — Z01.811 PRE-OP CHEST EXAM: ICD-10-CM

## 2017-11-27 LAB
ALBUMIN SERPL-MCNC: 3.4 G/DL (ref 3.5–5)
ALBUMIN/GLOB SERPL: 1.4 {RATIO} (ref 1.1–2.2)
ALP SERPL-CCNC: 91 U/L (ref 45–117)
ALT SERPL-CCNC: 26 U/L (ref 12–78)
ANION GAP SERPL CALC-SCNC: 6 MMOL/L (ref 5–15)
AST SERPL-CCNC: 7 U/L (ref 15–37)
ATRIAL RATE: 63 BPM
BASOPHILS # BLD: 0 K/UL (ref 0–0.1)
BASOPHILS NFR BLD: 0 % (ref 0–1)
BILIRUB SERPL-MCNC: 0.4 MG/DL (ref 0.2–1)
BUN SERPL-MCNC: 19 MG/DL (ref 6–20)
BUN/CREAT SERPL: 29 (ref 12–20)
CALCIUM SERPL-MCNC: 9.1 MG/DL (ref 8.5–10.1)
CALCULATED P AXIS, ECG09: 3 DEGREES
CALCULATED R AXIS, ECG10: 19 DEGREES
CALCULATED T AXIS, ECG11: 11 DEGREES
CHLORIDE SERPL-SCNC: 108 MMOL/L (ref 97–108)
CO2 SERPL-SCNC: 26 MMOL/L (ref 21–32)
CREAT SERPL-MCNC: 0.65 MG/DL (ref 0.7–1.3)
DIAGNOSIS, 93000: NORMAL
EOSINOPHIL # BLD: 0.2 K/UL (ref 0–0.4)
EOSINOPHIL NFR BLD: 2 % (ref 0–7)
ERYTHROCYTE [DISTWIDTH] IN BLOOD BY AUTOMATED COUNT: 15 % (ref 11.5–14.5)
GLOBULIN SER CALC-MCNC: 2.5 G/DL (ref 2–4)
GLUCOSE SERPL-MCNC: 90 MG/DL (ref 65–100)
HCT VFR BLD AUTO: 42.8 % (ref 36.6–50.3)
HGB BLD-MCNC: 14.1 G/DL (ref 12.1–17)
LYMPHOCYTES # BLD: 1.7 K/UL (ref 0.8–3.5)
LYMPHOCYTES NFR BLD: 15 % (ref 12–49)
MCH RBC QN AUTO: 29.9 PG (ref 26–34)
MCHC RBC AUTO-ENTMCNC: 32.9 G/DL (ref 30–36.5)
MCV RBC AUTO: 90.9 FL (ref 80–99)
MONOCYTES # BLD: 0.8 K/UL (ref 0–1)
MONOCYTES NFR BLD: 7 % (ref 5–13)
NEUTS SEG # BLD: 8.7 K/UL (ref 1.8–8)
NEUTS SEG NFR BLD: 76 % (ref 32–75)
P-R INTERVAL, ECG05: 130 MS
PLATELET # BLD AUTO: 296 K/UL (ref 150–400)
POTASSIUM SERPL-SCNC: 4.9 MMOL/L (ref 3.5–5.1)
PROT SERPL-MCNC: 5.9 G/DL (ref 6.4–8.2)
Q-T INTERVAL, ECG07: 426 MS
QRS DURATION, ECG06: 78 MS
QTC CALCULATION (BEZET), ECG08: 435 MS
RBC # BLD AUTO: 4.71 M/UL (ref 4.1–5.7)
SODIUM SERPL-SCNC: 140 MMOL/L (ref 136–145)
VENTRICULAR RATE, ECG03: 63 BPM
WBC # BLD AUTO: 11.4 K/UL (ref 4.1–11.1)

## 2017-11-27 PROCEDURE — 36415 COLL VENOUS BLD VENIPUNCTURE: CPT | Performed by: ORTHOPAEDIC SURGERY

## 2017-11-27 PROCEDURE — 71020 XR CHEST PA LAT: CPT

## 2017-11-27 PROCEDURE — 93005 ELECTROCARDIOGRAM TRACING: CPT

## 2017-11-27 PROCEDURE — 85025 COMPLETE CBC W/AUTO DIFF WBC: CPT | Performed by: ORTHOPAEDIC SURGERY

## 2017-11-27 PROCEDURE — 80053 COMPREHEN METABOLIC PANEL: CPT | Performed by: ORTHOPAEDIC SURGERY

## 2017-11-27 RX ORDER — OMEPRAZOLE 40 MG/1
40 CAPSULE, DELAYED RELEASE ORAL DAILY
COMMUNITY

## 2017-11-27 RX ORDER — ESZOPICLONE 3 MG/1
TABLET, FILM COATED ORAL
COMMUNITY

## 2017-11-27 RX ORDER — LORAZEPAM 2 MG/1
TABLET ORAL
COMMUNITY
End: 2019-05-08

## 2017-11-27 NOTE — PERIOP NOTES
PATIENT  GIVEN PREOPERATIVE INSTRUCTION SHEET AND SURGICAL SITE INFECTION FAQS SHEET. PATIENT ALSO GIVEN CHG WIPES WITH WRITTEN INSTRUCTIONS FOR USE WHICH WERE REVIEWED WITH PATIENT'S CAREGIVER AND  FROM PATIENT'S  ASSISTED LIVING FACILITY.

## 2017-11-28 LAB
BACTERIA SPEC CULT: ABNORMAL
BACTERIA SPEC CULT: ABNORMAL
SERVICE CMNT-IMP: ABNORMAL

## 2017-11-28 NOTE — PERIOP NOTES
LABS, EKG & CXR RESULTS FAXED TO DR. GILES. LEFT VOICEMAIL FOR IVET OLIVEIRA LPN TO REPORT CXR RESULT AND THAT CBC & CMP SHOW VARIOUS ABNORMAL VALUES.

## 2017-11-30 ENCOUNTER — ANESTHESIA EVENT (OUTPATIENT)
Dept: SURGERY | Age: 47
End: 2017-11-30
Payer: COMMERCIAL

## 2017-11-30 ENCOUNTER — HOSPITAL ENCOUNTER (OUTPATIENT)
Age: 47
Setting detail: OBSERVATION
Discharge: HOME | End: 2017-12-01
Attending: ORTHOPAEDIC SURGERY | Admitting: ORTHOPAEDIC SURGERY
Payer: COMMERCIAL

## 2017-11-30 ENCOUNTER — ANESTHESIA (OUTPATIENT)
Dept: SURGERY | Age: 47
End: 2017-11-30
Payer: COMMERCIAL

## 2017-11-30 PROBLEM — S06.9XAA TRAUMATIC BRAIN INJURY: Status: ACTIVE | Noted: 2017-11-30

## 2017-11-30 PROCEDURE — 74011250636 HC RX REV CODE- 250/636

## 2017-11-30 PROCEDURE — 76210000006 HC OR PH I REC 0.5 TO 1 HR: Performed by: ORTHOPAEDIC SURGERY

## 2017-11-30 PROCEDURE — 74011000272 HC RX REV CODE- 272: Performed by: ORTHOPAEDIC SURGERY

## 2017-11-30 PROCEDURE — 77030026438 HC STYL ET INTUB CARD -A: Performed by: ANESTHESIOLOGY

## 2017-11-30 PROCEDURE — 74011250636 HC RX REV CODE- 250/636: Performed by: ORTHOPAEDIC SURGERY

## 2017-11-30 PROCEDURE — 74011000250 HC RX REV CODE- 250: Performed by: ORTHOPAEDIC SURGERY

## 2017-11-30 PROCEDURE — 77030031139 HC SUT VCRL2 J&J -A: Performed by: ORTHOPAEDIC SURGERY

## 2017-11-30 PROCEDURE — 77030018836 HC SOL IRR NACL ICUM -A: Performed by: ORTHOPAEDIC SURGERY

## 2017-11-30 PROCEDURE — 76060000034 HC ANESTHESIA 1.5 TO 2 HR: Performed by: ORTHOPAEDIC SURGERY

## 2017-11-30 PROCEDURE — 77030002933 HC SUT MCRYL J&J -A: Performed by: ORTHOPAEDIC SURGERY

## 2017-11-30 PROCEDURE — 99218 HC RM OBSERVATION: CPT

## 2017-11-30 PROCEDURE — 76010000153 HC OR TIME 1.5 TO 2 HR: Performed by: ORTHOPAEDIC SURGERY

## 2017-11-30 PROCEDURE — 74011000250 HC RX REV CODE- 250

## 2017-11-30 PROCEDURE — 74011250636 HC RX REV CODE- 250/636: Performed by: ANESTHESIOLOGY

## 2017-11-30 PROCEDURE — 77030013789 HC KT REP BIO TEND ARTH -C: Performed by: ORTHOPAEDIC SURGERY

## 2017-11-30 PROCEDURE — 74011250637 HC RX REV CODE- 250/637: Performed by: ORTHOPAEDIC SURGERY

## 2017-11-30 PROCEDURE — 77030008684 HC TU ET CUF COVD -B: Performed by: ANESTHESIOLOGY

## 2017-11-30 PROCEDURE — C1713 ANCHOR/SCREW BN/BN,TIS/BN: HCPCS | Performed by: ORTHOPAEDIC SURGERY

## 2017-11-30 PROCEDURE — 77030000032 HC CUF TRNQT ZIMM -B: Performed by: ORTHOPAEDIC SURGERY

## 2017-11-30 PROCEDURE — 77030028224 HC PDNG CST BSNM -A: Performed by: ORTHOPAEDIC SURGERY

## 2017-11-30 PROCEDURE — 77030011640 HC PAD GRND REM COVD -A: Performed by: ORTHOPAEDIC SURGERY

## 2017-11-30 PROCEDURE — 77030027138 HC INCENT SPIROMETER -A

## 2017-11-30 DEVICE — SCREW INTFR L23MM DIA7MM BIOCOMPOSITE FACILITATE IORT TISS: Type: IMPLANTABLE DEVICE | Site: FOOT | Status: FUNCTIONAL

## 2017-11-30 RX ORDER — ONDANSETRON 2 MG/ML
4 INJECTION INTRAMUSCULAR; INTRAVENOUS
Status: DISCONTINUED | OUTPATIENT
Start: 2017-11-30 | End: 2017-12-01 | Stop reason: HOSPADM

## 2017-11-30 RX ORDER — PROPRANOLOL HYDROCHLORIDE 40 MG/1
80 TABLET ORAL 2 TIMES DAILY
Status: DISCONTINUED | OUTPATIENT
Start: 2017-11-30 | End: 2017-12-01 | Stop reason: HOSPADM

## 2017-11-30 RX ORDER — MORPHINE SULFATE 2 MG/ML
2 INJECTION, SOLUTION INTRAMUSCULAR; INTRAVENOUS
Status: DISCONTINUED | OUTPATIENT
Start: 2017-12-01 | End: 2017-12-01 | Stop reason: HOSPADM

## 2017-11-30 RX ORDER — DIAZEPAM 5 MG/1
5 TABLET ORAL
Qty: 30 TAB | Refills: 1 | Status: SHIPPED | OUTPATIENT
Start: 2017-11-30 | End: 2019-05-08

## 2017-11-30 RX ORDER — BROMOCRIPTINE MESYLATE 2.5 MG/1
2.5 TABLET ORAL 2 TIMES DAILY
Status: DISCONTINUED | OUTPATIENT
Start: 2017-11-30 | End: 2017-12-01 | Stop reason: HOSPADM

## 2017-11-30 RX ORDER — ONDANSETRON 2 MG/ML
INJECTION INTRAMUSCULAR; INTRAVENOUS AS NEEDED
Status: DISCONTINUED | OUTPATIENT
Start: 2017-11-30 | End: 2017-11-30 | Stop reason: HOSPADM

## 2017-11-30 RX ORDER — FENTANYL CITRATE 50 UG/ML
50 INJECTION, SOLUTION INTRAMUSCULAR; INTRAVENOUS AS NEEDED
Status: DISCONTINUED | OUTPATIENT
Start: 2017-11-30 | End: 2017-11-30 | Stop reason: HOSPADM

## 2017-11-30 RX ORDER — CLONAZEPAM 1 MG/1
0.5 TABLET ORAL 2 TIMES DAILY
Status: DISCONTINUED | OUTPATIENT
Start: 2017-11-30 | End: 2017-11-30

## 2017-11-30 RX ORDER — DEXAMETHASONE SODIUM PHOSPHATE 4 MG/ML
INJECTION, SOLUTION INTRA-ARTICULAR; INTRALESIONAL; INTRAMUSCULAR; INTRAVENOUS; SOFT TISSUE AS NEEDED
Status: DISCONTINUED | OUTPATIENT
Start: 2017-11-30 | End: 2017-11-30 | Stop reason: HOSPADM

## 2017-11-30 RX ORDER — PANTOPRAZOLE SODIUM 40 MG/1
40 TABLET, DELAYED RELEASE ORAL
Status: DISCONTINUED | OUTPATIENT
Start: 2017-11-30 | End: 2017-12-01 | Stop reason: HOSPADM

## 2017-11-30 RX ORDER — OXYCODONE AND ACETAMINOPHEN 5; 325 MG/1; MG/1
1 TABLET ORAL
Status: DISCONTINUED | OUTPATIENT
Start: 2017-12-01 | End: 2017-12-01 | Stop reason: HOSPADM

## 2017-11-30 RX ORDER — PHENYLEPHRINE HCL IN 0.9% NACL 0.4MG/10ML
SYRINGE (ML) INTRAVENOUS AS NEEDED
Status: DISCONTINUED | OUTPATIENT
Start: 2017-11-30 | End: 2017-11-30 | Stop reason: HOSPADM

## 2017-11-30 RX ORDER — GLYCOPYRROLATE 0.2 MG/ML
INJECTION INTRAMUSCULAR; INTRAVENOUS AS NEEDED
Status: DISCONTINUED | OUTPATIENT
Start: 2017-11-30 | End: 2017-11-30 | Stop reason: HOSPADM

## 2017-11-30 RX ORDER — MORPHINE SULFATE IN 0.9 % NACL 150MG/30ML
PATIENT CONTROLLED ANALGESIA SYRINGE INTRAVENOUS
Status: COMPLETED
Start: 2017-11-30 | End: 2017-11-30

## 2017-11-30 RX ORDER — SENNOSIDES 8.6 MG/1
1 TABLET ORAL 2 TIMES DAILY
Status: DISCONTINUED | OUTPATIENT
Start: 2017-11-30 | End: 2017-12-01 | Stop reason: HOSPADM

## 2017-11-30 RX ORDER — CLONAZEPAM 1 MG/1
0.5 TABLET ORAL 2 TIMES DAILY
Status: DISCONTINUED | OUTPATIENT
Start: 2017-11-30 | End: 2017-12-01 | Stop reason: HOSPADM

## 2017-11-30 RX ORDER — MIDAZOLAM HYDROCHLORIDE 1 MG/ML
1 INJECTION, SOLUTION INTRAMUSCULAR; INTRAVENOUS AS NEEDED
Status: DISCONTINUED | OUTPATIENT
Start: 2017-11-30 | End: 2017-11-30 | Stop reason: HOSPADM

## 2017-11-30 RX ORDER — ROCURONIUM BROMIDE 10 MG/ML
INJECTION, SOLUTION INTRAVENOUS AS NEEDED
Status: DISCONTINUED | OUTPATIENT
Start: 2017-11-30 | End: 2017-11-30 | Stop reason: HOSPADM

## 2017-11-30 RX ORDER — LORAZEPAM 1 MG/1
2 TABLET ORAL
Status: DISCONTINUED | OUTPATIENT
Start: 2017-11-30 | End: 2017-12-01 | Stop reason: HOSPADM

## 2017-11-30 RX ORDER — SODIUM CHLORIDE, SODIUM LACTATE, POTASSIUM CHLORIDE, CALCIUM CHLORIDE 600; 310; 30; 20 MG/100ML; MG/100ML; MG/100ML; MG/100ML
100 INJECTION, SOLUTION INTRAVENOUS CONTINUOUS
Status: DISPENSED | OUTPATIENT
Start: 2017-11-30 | End: 2017-12-01

## 2017-11-30 RX ORDER — CEFAZOLIN SODIUM 1 G/3ML
INJECTION, POWDER, FOR SOLUTION INTRAMUSCULAR; INTRAVENOUS AS NEEDED
Status: DISCONTINUED | OUTPATIENT
Start: 2017-11-30 | End: 2017-11-30 | Stop reason: HOSPADM

## 2017-11-30 RX ORDER — OXYCODONE AND ACETAMINOPHEN 5; 325 MG/1; MG/1
1 TABLET ORAL
Qty: 50 TAB | Refills: 0 | Status: SHIPPED | OUTPATIENT
Start: 2017-12-01 | End: 2018-05-15

## 2017-11-30 RX ORDER — SODIUM CHLORIDE 0.9 % (FLUSH) 0.9 %
5-10 SYRINGE (ML) INJECTION AS NEEDED
Status: DISCONTINUED | OUTPATIENT
Start: 2017-11-30 | End: 2017-11-30 | Stop reason: HOSPADM

## 2017-11-30 RX ORDER — FACIAL-BODY WIPES
10 EACH TOPICAL
Status: DISCONTINUED | OUTPATIENT
Start: 2017-11-30 | End: 2017-12-01 | Stop reason: HOSPADM

## 2017-11-30 RX ORDER — SODIUM CHLORIDE 0.9 % (FLUSH) 0.9 %
5-10 SYRINGE (ML) INJECTION AS NEEDED
Status: DISCONTINUED | OUTPATIENT
Start: 2017-11-30 | End: 2017-12-01 | Stop reason: HOSPADM

## 2017-11-30 RX ORDER — LITHIUM CARBONATE 300 MG/1
300 TABLET, FILM COATED, EXTENDED RELEASE ORAL 4 TIMES DAILY
Status: DISCONTINUED | OUTPATIENT
Start: 2017-11-30 | End: 2017-11-30

## 2017-11-30 RX ORDER — SODIUM CHLORIDE 9 MG/ML
25 INJECTION, SOLUTION INTRAVENOUS CONTINUOUS
Status: DISCONTINUED | OUTPATIENT
Start: 2017-11-30 | End: 2017-11-30 | Stop reason: HOSPADM

## 2017-11-30 RX ORDER — TRAZODONE HYDROCHLORIDE 100 MG/1
300 TABLET ORAL
Status: DISCONTINUED | OUTPATIENT
Start: 2017-11-30 | End: 2017-12-01 | Stop reason: HOSPADM

## 2017-11-30 RX ORDER — LIDOCAINE HYDROCHLORIDE 10 MG/ML
0.1 INJECTION, SOLUTION EPIDURAL; INFILTRATION; INTRACAUDAL; PERINEURAL AS NEEDED
Status: DISCONTINUED | OUTPATIENT
Start: 2017-11-30 | End: 2017-11-30 | Stop reason: HOSPADM

## 2017-11-30 RX ORDER — LIDOCAINE HYDROCHLORIDE 20 MG/ML
INJECTION, SOLUTION EPIDURAL; INFILTRATION; INTRACAUDAL; PERINEURAL AS NEEDED
Status: DISCONTINUED | OUTPATIENT
Start: 2017-11-30 | End: 2017-11-30 | Stop reason: HOSPADM

## 2017-11-30 RX ORDER — MIDAZOLAM HYDROCHLORIDE 1 MG/ML
0.5 INJECTION, SOLUTION INTRAMUSCULAR; INTRAVENOUS
Status: DISCONTINUED | OUTPATIENT
Start: 2017-11-30 | End: 2017-11-30 | Stop reason: HOSPADM

## 2017-11-30 RX ORDER — LITHIUM CARBONATE 300 MG/1
1200 TABLET, FILM COATED, EXTENDED RELEASE ORAL DAILY
Status: DISCONTINUED | OUTPATIENT
Start: 2017-12-01 | End: 2017-12-01 | Stop reason: HOSPADM

## 2017-11-30 RX ORDER — DIPHENHYDRAMINE HYDROCHLORIDE 50 MG/ML
12.5 INJECTION, SOLUTION INTRAMUSCULAR; INTRAVENOUS AS NEEDED
Status: DISCONTINUED | OUTPATIENT
Start: 2017-11-30 | End: 2017-11-30 | Stop reason: HOSPADM

## 2017-11-30 RX ORDER — NEOSTIGMINE METHYLSULFATE 1 MG/ML
INJECTION INTRAVENOUS AS NEEDED
Status: DISCONTINUED | OUTPATIENT
Start: 2017-11-30 | End: 2017-11-30 | Stop reason: HOSPADM

## 2017-11-30 RX ORDER — FENTANYL CITRATE 50 UG/ML
25 INJECTION, SOLUTION INTRAMUSCULAR; INTRAVENOUS
Status: DISCONTINUED | OUTPATIENT
Start: 2017-11-30 | End: 2017-11-30 | Stop reason: HOSPADM

## 2017-11-30 RX ORDER — ONDANSETRON 2 MG/ML
4 INJECTION INTRAMUSCULAR; INTRAVENOUS AS NEEDED
Status: DISCONTINUED | OUTPATIENT
Start: 2017-11-30 | End: 2017-11-30 | Stop reason: HOSPADM

## 2017-11-30 RX ORDER — PROPOFOL 10 MG/ML
INJECTION, EMULSION INTRAVENOUS AS NEEDED
Status: DISCONTINUED | OUTPATIENT
Start: 2017-11-30 | End: 2017-11-30 | Stop reason: HOSPADM

## 2017-11-30 RX ORDER — ZOLPIDEM TARTRATE 5 MG/1
5 TABLET ORAL
Status: DISCONTINUED | OUTPATIENT
Start: 2017-11-30 | End: 2017-12-01 | Stop reason: HOSPADM

## 2017-11-30 RX ORDER — SODIUM CHLORIDE 0.9 % (FLUSH) 0.9 %
5-10 SYRINGE (ML) INJECTION EVERY 8 HOURS
Status: DISCONTINUED | OUTPATIENT
Start: 2017-11-30 | End: 2017-11-30 | Stop reason: HOSPADM

## 2017-11-30 RX ORDER — MIDAZOLAM HYDROCHLORIDE 1 MG/ML
INJECTION, SOLUTION INTRAMUSCULAR; INTRAVENOUS AS NEEDED
Status: DISCONTINUED | OUTPATIENT
Start: 2017-11-30 | End: 2017-11-30 | Stop reason: HOSPADM

## 2017-11-30 RX ORDER — SODIUM CHLORIDE 0.9 % (FLUSH) 0.9 %
5-10 SYRINGE (ML) INJECTION EVERY 8 HOURS
Status: DISCONTINUED | OUTPATIENT
Start: 2017-11-30 | End: 2017-12-01 | Stop reason: HOSPADM

## 2017-11-30 RX ORDER — GLYCOPYRROLATE 0.2 MG/ML
INJECTION INTRAMUSCULAR; INTRAVENOUS AS NEEDED
Status: DISCONTINUED | OUTPATIENT
Start: 2017-11-30 | End: 2017-11-30

## 2017-11-30 RX ORDER — MORPHINE SULFATE IN 0.9 % NACL 150MG/30ML
PATIENT CONTROLLED ANALGESIA SYRINGE INTRAVENOUS CONTINUOUS
Status: DISPENSED | OUTPATIENT
Start: 2017-11-30 | End: 2017-12-01

## 2017-11-30 RX ORDER — FLUTICASONE PROPIONATE 50 MCG
2 SPRAY, SUSPENSION (ML) NASAL 2 TIMES DAILY
Status: DISCONTINUED | OUTPATIENT
Start: 2017-11-30 | End: 2017-12-01 | Stop reason: HOSPADM

## 2017-11-30 RX ORDER — OXYBUTYNIN CHLORIDE 5 MG/1
5 TABLET, EXTENDED RELEASE ORAL DAILY
Status: DISCONTINUED | OUTPATIENT
Start: 2017-12-01 | End: 2017-12-01 | Stop reason: HOSPADM

## 2017-11-30 RX ORDER — CEFAZOLIN SODIUM/WATER 2 G/20 ML
2 SYRINGE (ML) INTRAVENOUS EVERY 8 HOURS
Status: COMPLETED | OUTPATIENT
Start: 2017-11-30 | End: 2017-12-01

## 2017-11-30 RX ORDER — SODIUM CHLORIDE, SODIUM LACTATE, POTASSIUM CHLORIDE, CALCIUM CHLORIDE 600; 310; 30; 20 MG/100ML; MG/100ML; MG/100ML; MG/100ML
INJECTION, SOLUTION INTRAVENOUS
Status: DISCONTINUED | OUTPATIENT
Start: 2017-11-30 | End: 2017-11-30 | Stop reason: HOSPADM

## 2017-11-30 RX ORDER — SODIUM CHLORIDE, SODIUM LACTATE, POTASSIUM CHLORIDE, CALCIUM CHLORIDE 600; 310; 30; 20 MG/100ML; MG/100ML; MG/100ML; MG/100ML
100 INJECTION, SOLUTION INTRAVENOUS CONTINUOUS
Status: DISCONTINUED | OUTPATIENT
Start: 2017-11-30 | End: 2017-11-30 | Stop reason: HOSPADM

## 2017-11-30 RX ORDER — DIAZEPAM 2 MG/1
5 TABLET ORAL
Status: DISCONTINUED | OUTPATIENT
Start: 2017-11-30 | End: 2017-12-01 | Stop reason: HOSPADM

## 2017-11-30 RX ORDER — ROPIVACAINE HYDROCHLORIDE 5 MG/ML
30 INJECTION, SOLUTION EPIDURAL; INFILTRATION; PERINEURAL AS NEEDED
Status: DISCONTINUED | OUTPATIENT
Start: 2017-11-30 | End: 2017-11-30 | Stop reason: HOSPADM

## 2017-11-30 RX ORDER — BUPIVACAINE HYDROCHLORIDE 5 MG/ML
INJECTION, SOLUTION EPIDURAL; INTRACAUDAL AS NEEDED
Status: DISCONTINUED | OUTPATIENT
Start: 2017-11-30 | End: 2017-11-30 | Stop reason: HOSPADM

## 2017-11-30 RX ORDER — ERGOCALCIFEROL 1.25 MG/1
50000 CAPSULE ORAL
Status: DISCONTINUED | OUTPATIENT
Start: 2017-12-04 | End: 2017-12-01 | Stop reason: HOSPADM

## 2017-11-30 RX ORDER — FENTANYL CITRATE 50 UG/ML
INJECTION, SOLUTION INTRAMUSCULAR; INTRAVENOUS AS NEEDED
Status: DISCONTINUED | OUTPATIENT
Start: 2017-11-30 | End: 2017-11-30 | Stop reason: HOSPADM

## 2017-11-30 RX ORDER — MORPHINE SULFATE 10 MG/ML
2 INJECTION, SOLUTION INTRAMUSCULAR; INTRAVENOUS
Status: DISCONTINUED | OUTPATIENT
Start: 2017-11-30 | End: 2017-11-30 | Stop reason: HOSPADM

## 2017-11-30 RX ORDER — ATROPINE SULFATE 0.4 MG/ML
INJECTION, SOLUTION ENDOTRACHEAL; INTRAMEDULLARY; INTRAMUSCULAR; INTRAVENOUS; SUBCUTANEOUS AS NEEDED
Status: DISCONTINUED | OUTPATIENT
Start: 2017-11-30 | End: 2017-11-30 | Stop reason: HOSPADM

## 2017-11-30 RX ADMIN — Medication 80 MCG: at 08:15

## 2017-11-30 RX ADMIN — ATROPINE SULFATE 0.2 MG: 0.4 INJECTION, SOLUTION ENDOTRACHEAL; INTRAMEDULLARY; INTRAMUSCULAR; INTRAVENOUS; SUBCUTANEOUS at 07:42

## 2017-11-30 RX ADMIN — SODIUM CHLORIDE, SODIUM LACTATE, POTASSIUM CHLORIDE, CALCIUM CHLORIDE: 600; 310; 30; 20 INJECTION, SOLUTION INTRAVENOUS at 07:33

## 2017-11-30 RX ADMIN — FENTANYL CITRATE 50 MCG: 50 INJECTION, SOLUTION INTRAMUSCULAR; INTRAVENOUS at 07:42

## 2017-11-30 RX ADMIN — CLONAZEPAM 0.5 MG: 1 TABLET ORAL at 16:16

## 2017-11-30 RX ADMIN — GLYCOPYRROLATE 2.5 MG: 0.2 INJECTION INTRAMUSCULAR; INTRAVENOUS at 09:06

## 2017-11-30 RX ADMIN — Medication 120 MCG: at 07:54

## 2017-11-30 RX ADMIN — ATROPINE SULFATE 0.2 MG: 0.4 INJECTION, SOLUTION ENDOTRACHEAL; INTRAMEDULLARY; INTRAMUSCULAR; INTRAVENOUS; SUBCUTANEOUS at 08:00

## 2017-11-30 RX ADMIN — SODIUM CHLORIDE, SODIUM LACTATE, POTASSIUM CHLORIDE, AND CALCIUM CHLORIDE 100 ML/HR: 600; 310; 30; 20 INJECTION, SOLUTION INTRAVENOUS at 09:45

## 2017-11-30 RX ADMIN — Medication: at 09:43

## 2017-11-30 RX ADMIN — Medication 10 ML: at 16:14

## 2017-11-30 RX ADMIN — MIDAZOLAM HYDROCHLORIDE 0.5 MG: 1 INJECTION, SOLUTION INTRAMUSCULAR; INTRAVENOUS at 09:50

## 2017-11-30 RX ADMIN — Medication 2 G: at 16:14

## 2017-11-30 RX ADMIN — MIDAZOLAM HYDROCHLORIDE 2 MG: 1 INJECTION, SOLUTION INTRAMUSCULAR; INTRAVENOUS at 07:33

## 2017-11-30 RX ADMIN — PROPOFOL 30 MG: 10 INJECTION, EMULSION INTRAVENOUS at 08:08

## 2017-11-30 RX ADMIN — FENTANYL CITRATE 50 MCG: 50 INJECTION, SOLUTION INTRAMUSCULAR; INTRAVENOUS at 08:08

## 2017-11-30 RX ADMIN — Medication 80 MCG: at 07:59

## 2017-11-30 RX ADMIN — PROPRANOLOL HYDROCHLORIDE 80 MG: 40 TABLET ORAL at 19:48

## 2017-11-30 RX ADMIN — NEOSTIGMINE METHYLSULFATE 3 MG: 1 INJECTION INTRAVENOUS at 09:06

## 2017-11-30 RX ADMIN — MIDAZOLAM HYDROCHLORIDE 0.5 MG: 1 INJECTION, SOLUTION INTRAMUSCULAR; INTRAVENOUS at 09:55

## 2017-11-30 RX ADMIN — TRAZODONE HYDROCHLORIDE 300 MG: 100 TABLET ORAL at 22:29

## 2017-11-30 RX ADMIN — LIDOCAINE HYDROCHLORIDE 100 MG: 20 INJECTION, SOLUTION EPIDURAL; INFILTRATION; INTRACAUDAL; PERINEURAL at 07:42

## 2017-11-30 RX ADMIN — ATROPINE SULFATE 0.2 MG: 0.4 INJECTION, SOLUTION ENDOTRACHEAL; INTRAMEDULLARY; INTRAMUSCULAR; INTRAVENOUS; SUBCUTANEOUS at 07:50

## 2017-11-30 RX ADMIN — PANTOPRAZOLE SODIUM 40 MG: 40 TABLET, DELAYED RELEASE ORAL at 16:14

## 2017-11-30 RX ADMIN — ZOLPIDEM TARTRATE 5 MG: 5 TABLET ORAL at 22:29

## 2017-11-30 RX ADMIN — FENTANYL CITRATE 25 MCG: 50 INJECTION, SOLUTION INTRAMUSCULAR; INTRAVENOUS at 09:30

## 2017-11-30 RX ADMIN — SENNOSIDES 8.6 MG: 8.6 TABLET, FILM COATED ORAL at 16:14

## 2017-11-30 RX ADMIN — ROCURONIUM BROMIDE 30 MG: 10 INJECTION, SOLUTION INTRAVENOUS at 07:42

## 2017-11-30 RX ADMIN — FLUTICASONE PROPIONATE 2 SPRAY: 50 SPRAY, METERED NASAL at 16:14

## 2017-11-30 RX ADMIN — Medication 10 ML: at 22:33

## 2017-11-30 RX ADMIN — ONDANSETRON 4 MG: 2 INJECTION INTRAMUSCULAR; INTRAVENOUS at 08:11

## 2017-11-30 RX ADMIN — PROPOFOL 150 MG: 10 INJECTION, EMULSION INTRAVENOUS at 07:42

## 2017-11-30 RX ADMIN — CEFAZOLIN SODIUM 2 G: 1 INJECTION, POWDER, FOR SOLUTION INTRAMUSCULAR; INTRAVENOUS at 08:06

## 2017-11-30 RX ADMIN — Medication 120 MCG: at 07:42

## 2017-11-30 RX ADMIN — ATROPINE SULFATE 0.2 MG: 0.4 INJECTION, SOLUTION ENDOTRACHEAL; INTRAMEDULLARY; INTRAMUSCULAR; INTRAVENOUS; SUBCUTANEOUS at 08:16

## 2017-11-30 RX ADMIN — Medication 80 MCG: at 07:47

## 2017-11-30 RX ADMIN — DEXAMETHASONE SODIUM PHOSPHATE 4 MG: 4 INJECTION, SOLUTION INTRA-ARTICULAR; INTRALESIONAL; INTRAMUSCULAR; INTRAVENOUS; SOFT TISSUE at 08:11

## 2017-11-30 RX ADMIN — BROMOCRIPTINE MESYLATE 2.5 MG: 2.5 TABLET ORAL at 16:15

## 2017-11-30 NOTE — BRIEF OP NOTE
BRIEF OPERATIVE NOTE    Date of Procedure: 11/30/2017   Preoperative Diagnosis: EQUINOVARUS DEFORMITY LEFT FOOT   Postoperative Diagnosis: EQUINOVARUS DEFORMITY LEFT FOOT     Procedure(s):  LEFT ANTERIOR TIBIAL TENDON TRANSFER / ACHILLES TENDON LENGTHENING    Surgeon(s) and Role:     * Agustin Champagne MD - Primary         Assistant Staff:  Nurse Practitioner: Xiomara Nesbitt NP    Surgical Staff:  Circ-1: Cornell Gregg RN  Scrub RN-1: Lena Jeong RN  Nurse Practitioner: Xiomara Nesbitt NP  Event Time In   Incision Start 2891   Incision Close 0902     Anesthesia: General   Estimated Blood Loss: 4 ml  Specimens: * No specimens in log *   Findings: see op note   Complications: none  Implants:   Implant Name Type Inv.  Item Serial No.  Lot No. LRB No. Used Action   SCR TENODESIS BIOCOMP [id number] --  - SN/A   SCR TENODESIS BIOCOMP [id number] --  N/A [employer] [id number] Left 1 Implanted

## 2017-11-30 NOTE — PERIOP NOTES
TRANSFER - OUT REPORT:    Verbal report given to 5S RN(name) on Regina Young  being transferred to 566(unit) for routine progression of care       Report consisted of patients Situation, Background, Assessment and   Recommendations(SBAR). Time Pre op antibiotic given:0806  Anesthesia Stop time: 6627  Valentin Present on Transfer to floor:n  Order for Valentin on Chart:n    Information from the following report(s) SBAR, OR Summary, Intake/Output and MAR was reviewed with the receiving nurse. Opportunity for questions and clarification was provided. Is the patient on 02? YES       L/Min 2       Other 0    Is the patient on a monitor? NO    Is the nurse transporting with the patient? NO    Surgical Waiting Area notified of patient's transfer from PACU? YES      The following personal items collected during your admission accompanied patient upon transfer:   Dental Appliance: Dental Appliances: None  Vision:    Hearing Aid:    Jewelry:    Clothing: Clothing: Other (comment) (clothing bag to pacu)  Other Valuables:  Other Valuables: Wheelchair (sent to PACU)  Valuables sent to safe:

## 2017-11-30 NOTE — PROGRESS NOTES
10:10 AM  SBAR/Report from Brandon Oviedo in PACU. TRANSFER - IN REPORT:    Verbal report received from Brandon Oviedo RN(name) on Pradeep Garcia  being received from PACU(unit) for routine post - op      Report consisted of patients Situation, Background, Assessment and   Recommendations(SBAR). Information from the following report(s) SBAR, Kardex, OR Summary, Procedure Summary, Intake/Output and MAR was reviewed with the receiving nurse. Opportunity for questions and clarification was provided. Assessment completed upon patients arrival to unit and care assumed.

## 2017-11-30 NOTE — PROGRESS NOTES
11:06 AM  Primary Nurse Nicole Campbell and Vargas, RN performed a dual skin assessment on this patient No impairment noted  Chito score is 16

## 2017-11-30 NOTE — OP NOTES
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 POSTOPERATIVE DIAGNOSIS:  Equinovarus foot, status post   traumatic brain injury and hemiplegia of the left foot. PROCEDURES PERFORMED:    1. Left foot anterior tibial tendon transfer. 2. Achilles tendon lengthening. 3. Posterior tibial tendon lengthening. 4. Flexor hallucis longus lengthening. 5. Flexor digitorum longus lengthening. ESTIMATED BLOOD LOSS: 0  SPECIMENS REMOVED: 0    ANESTHESIA:  General anesthesia     SURGEON: Moses Mahoney MD.    ASSISTANT: Brad Lozoya NP    DESCRIPTION OF PROCEDURE: After the successful induction of general anesthesia,  the patient was placed in the   supine position on the operating table. The foot was prepped and   draped in the usual sterile fashion. The limb was exsanguinated,   tourniquet inflated to 300 mmHg. A Olvin triple hemisection heel cord lengthening was undertaken   through three 1/4-inch stab incisions behind the Achilles tendon and   the foot was seen to dorsiflex nicely. The posterior tibial tendon was seen to be quite tight and a 3 cm   incision was made behind the medial malleolus and dissection carried   down to the tendon. It was Z lengthened and the varus deformity   nature of the tendon forces was removed. A 2 cm incision was made over the insertion of the anterior tibial   tendon. Dissection carried down through the subcutaneous tissues and   skin.  The dorsal half of the tendon, 60%, was removed and   whipstitched with a FiberWire loop, and this was brought out through a   separate 2 cm incision was made just above the ankle anteriorly over   the anterior tib tendon and it was brought out into that wound. Another incision was made over the cuboid on the foot. A 3 cm incision   was made there. Dissection carried down to the cuboid. A guidewire   was placed in it and the tendon was sized to be a 5-mm hole and a 7-  mm interference screw. The tendon was routed subcutaneously down   to this area and fixed with the 7 mm screw and the foot was seen to be   in satisfactory position. The toes were seen to be curled and flexed in   this corrected position and returned to the wound behind the medial   malleolus and Frost lengthenings of the flexor hallucis longus and   flexor digitorum longus was accomplished. Foot was then seen to be in satisfactory position. All wounds were   closed in separate layers in subcutaneous tissues to the skin. Sterile   dressings and a short-leg cast were applied. The patient was awakened, sent to the recovery room in satisfactory   condition. TOURNIQUET TIME: Approximately 1 hour.         MD Joseph Cantor / Mignon Do   D:  11/30/2017   08:52   T:  11/30/2017   12:28   Job #:  [id number]

## 2017-11-30 NOTE — ANESTHESIA POSTPROCEDURE EVALUATION
Post-Anesthesia Evaluation and Assessment    Patient: Marlin Hunter MRN: [id number]  SSN: [id number]    YOB: 1970  Age: 49 y.o. Sex: male       Cardiovascular Function/Vital Signs  Visit Vitals    /71 (BP 1 Location: Right arm, BP Patient Position: At rest)    Pulse 82    Temp 36.9 °C (98.5 °F)    Resp 16    Ht 5' 8\" (1.727 m)    Wt 60.8 kg (134 lb)    SpO2 93%    BMI 20.37 kg/m2       Patient is status post general anesthesia for Procedure(s):  LEFT ANTERIOR TIBIAL TENDON TRANSFER / ACHILLES TENDON LENGTHENING  . Nausea/Vomiting: None    Postoperative hydration reviewed and adequate. Pain:  Pain Scale 1: Numeric (0 - 10) (11/30/17 1352)  Pain Intensity 1: 0 (11/30/17 1352)   Managed    Neurological Status:   Neuro (WDL): Exceptions to WDL (11/30/17 0645)  Neuro  Neurologic State: Drowsy (11/30/17 0403)  Orientation Level: Oriented to person (11/30/17 1269)  Cognition: Dao Pour commands (11/30/17 0927)  LUE Motor Response:  unequal R greater than L;Weak (11/30/17 0645)   At baseline    Mental Status and Level of Consciousness: Arousable    Pulmonary Status:   O2 Device: Nasal cannula (11/30/17 1058)   Adequate oxygenation and airway patent    Complications related to anesthesia: None    Post-anesthesia assessment completed.  No concerns    Signed By: Roseann Culver MD     November 30, 2017

## 2017-11-30 NOTE — ANESTHESIA PREPROCEDURE EVALUATION
Anesthetic History   No history of anesthetic complications            Review of Systems / Medical History  Patient summary reviewed, nursing notes reviewed and pertinent labs reviewed    Pulmonary  Within defined limits                 Neuro/Psych         Psychiatric history and dementia     Cardiovascular  Within defined limits                Exercise tolerance: >4 METS     GI/Hepatic/Renal  Within defined limits              Endo/Other        Cancer     Other Findings   Comments: TBI  Quadriplegia         Physical Exam    Airway  Mallampati: III  TM Distance: 4 - 6 cm  Neck ROM: decreased range of motion   Mouth opening: Normal     Cardiovascular  Regular rate and rhythm,  S1 and S2 normal,  no murmur, click, rub, or gallop             Dental  No notable dental hx       Pulmonary  Breath sounds clear to auscultation               Abdominal  GI exam deferred       Other Findings            Anesthetic Plan    ASA: 3  Anesthesia type: general          Induction: Intravenous  Anesthetic plan and risks discussed with: Patient      cmac

## 2017-11-30 NOTE — DISCHARGE INSTRUCTIONS
Wearing a Cast: Care Instructions  Your Care Instructions  A cast protects a broken bone or other injury. Most casts are made of fiberglass, but plaster casts are still sometimes used. Once a cast is on, you can't remove it yourself. Your doctor will take it off. Follow-up care is a key part of your treatment and safety. Be sure to make and go to all appointments, and call your doctor if you are having problems. It's also a good idea to know your test results and keep a list of the medicines you take. How can you care for yourself at home? General care  · Follow your doctor's instructions for when you can first put weight on the cast. Fiberglass casts dry quickly and are soon ready to bear weight. But plaster casts may take several days before they are hard enough to use. When it's okay to put weight on your cast, do not stand or walk on it unless it is designed for walking. · Prop up the injured arm or leg on a pillow anytime you sit or lie down during the next 3 days. Try to keep it above the level of your heart. This will help reduce swelling. · If the fingers or toes on the limb with the cast were not injured, wiggle them every now and then. This helps move the blood and fluids in the injured limb. · Be safe with medicines. Read and follow all instructions on the label. ¨ If the doctor gave you a prescription medicine for pain, take it as prescribed. ¨ If you are not taking a prescription pain medicine, ask your doctor if you can take an over-the-counter medicine. · Keep up your muscle strength and tone as much as you can while protecting your injured limb or joint. Your doctor may want you to tense and relax the muscles protected by the cast. Check with your doctor or your physical or occupational therapist for instructions. Water and your cast  · Keep your cast dry. · Tape a sheet of plastic to cover your cast when you take a shower or bath or when you have any other contact with water. Moisture can collect under the cast and cause skin irritation and itching. It can make infection more likely if you have had surgery or have a wound under the cast.  Cast and skin care  · Try blowing cool air from a hair dryer or fan into the cast to help relieve itching. Never stick items under your cast to scratch the skin. · Don't use oils or lotions near your cast. If the skin gets red or irritated around the edge of the cast, you may pad the edges with a soft material or use tape to cover them. When should you call for help? Call your doctor now or seek immediate medical care if:  ? · You have increased or severe pain. ? · You feel a warm or painful spot under the cast.   ? · You have problems with your cast. For example:  ¨ The skin under the cast burns or stings. ¨ The cast feels too tight. ¨ There is a lot of swelling near the cast. (Some swelling is normal.)  ¨ You have a new fever. ¨ There is drainage or a bad smell coming from the cast.   ? · Your foot or hand is cool or pale or changes color. ? · You have trouble moving your fingers or toes. ? · You have symptoms of a blood clot in your arm or leg (called a deep vein thrombosis). These may include:  ¨ Pain in the arm, calf, back of the knee, thigh, or groin. ¨ Redness and swelling in the arm, leg, or groin. ? Watch closely for changes in your health, and be sure to contact your doctor if:  ? · The cast is breaking apart. ? · You are not getting better as expected. Where can you learn more? Go to http://nicki-rhonda.info/. Enter 454 [id number] in the search box to learn more about \"Wearing a Cast: Care Instructions. \"  Current as of: March 21, 2017  Content Version: 11.4  © 8614-5825 [employer]. Care instructions adapted under license by [employer] (which disclaims liability or warranty for this information).  If you have questions about a medical condition or this instruction, always ask your healthcare professional. [address] any warranty or liability for your use of this information.

## 2017-12-01 VITALS
WEIGHT: 134 LBS | HEIGHT: 68 IN | HEART RATE: 81 BPM | TEMPERATURE: 98.7 F | OXYGEN SATURATION: 92 % | DIASTOLIC BLOOD PRESSURE: 76 MMHG | SYSTOLIC BLOOD PRESSURE: 107 MMHG | BODY MASS INDEX: 20.31 KG/M2 | RESPIRATION RATE: 16 BRPM

## 2017-12-01 PROCEDURE — 97530 THERAPEUTIC ACTIVITIES: CPT

## 2017-12-01 PROCEDURE — 99218 HC RM OBSERVATION: CPT

## 2017-12-01 PROCEDURE — 97161 PT EVAL LOW COMPLEX 20 MIN: CPT

## 2017-12-01 PROCEDURE — 74011250637 HC RX REV CODE- 250/637: Performed by: ORTHOPAEDIC SURGERY

## 2017-12-01 PROCEDURE — 74011250636 HC RX REV CODE- 250/636: Performed by: ORTHOPAEDIC SURGERY

## 2017-12-01 RX ADMIN — ACETAMINOPHEN 650 MG: 650 SOLUTION ORAL at 11:36

## 2017-12-01 RX ADMIN — OXYBUTYNIN CHLORIDE 5 MG: 5 TABLET, EXTENDED RELEASE ORAL at 09:32

## 2017-12-01 RX ADMIN — CLONAZEPAM 0.5 MG: 1 TABLET ORAL at 07:01

## 2017-12-01 RX ADMIN — Medication 1 CAPSULE: at 09:32

## 2017-12-01 RX ADMIN — PANTOPRAZOLE SODIUM 40 MG: 40 TABLET, DELAYED RELEASE ORAL at 06:59

## 2017-12-01 RX ADMIN — Medication 10 ML: at 07:00

## 2017-12-01 RX ADMIN — BROMOCRIPTINE MESYLATE 2.5 MG: 2.5 TABLET ORAL at 09:32

## 2017-12-01 RX ADMIN — CLONAZEPAM 0.5 MG: 1 TABLET ORAL at 13:13

## 2017-12-01 RX ADMIN — FLUTICASONE PROPIONATE 2 SPRAY: 50 SPRAY, METERED NASAL at 09:33

## 2017-12-01 RX ADMIN — Medication 2 G: at 00:09

## 2017-12-01 RX ADMIN — LITHIUM CARBONATE 1200 MG: 300 TABLET, FILM COATED, EXTENDED RELEASE ORAL at 07:03

## 2017-12-01 RX ADMIN — SENNOSIDES 8.6 MG: 8.6 TABLET, FILM COATED ORAL at 09:31

## 2017-12-01 RX ADMIN — PROPRANOLOL HYDROCHLORIDE 80 MG: 40 TABLET ORAL at 09:33

## 2017-12-01 NOTE — PROGRESS NOTES
Discharge order noted. The patient came in from [place] [address]. JOSE ALFREDO spoke with Dr. Rosalva Alexander [phone]. He has requested for Dr. Nicky Jacobs to call him or the RN taking care of the patient. CRM passed the message on to [address] [place] [address] called and spoke with Shara Galindo case manger for [facility]. [phone]. They will provide transport at 2pm today. CRM faxed the medicals and the discharge orders to her at [phone].  KUMAR

## 2017-12-01 NOTE — PROGRESS NOTES
physical Therapy EVALUATION/DISCHARGE  Patient: Regina Young (84 y.o. male)  Date: 12/1/2017  Primary Diagnosis: EQUINOVARUS DEFORMITY LEFT FOOT   Traumatic brain injury ([facility]  Procedure(s) (LRB):  LEFT ANTERIOR TIBIAL TENDON TRANSFER / ACHILLES TENDON LENGTHENING   (Left) 1 Day Post-Op   Precautions:   WBAT (LLE)  ASSESSMENT :  Based on the objective data described below, the patient presents with impaired mobility due to TBI sustained years ago and now admitted for tendon transfer and achilles lengthening due to equinovarus deformity of the L foot. He is WBAT in cast, and cast shoe was applied prior to transfer. He was able to transfer to his wheelchair with moderate assistance, which is close to his baseline. He then stood several times for hygiene, needing max assist of 1-2 for standing pulling himself up with the grab bar in the bathroom. He did report some pain in the L foot with standing, but resolved once sitting. Patient is a resident at [address] [place] [address] and is clear to return there from a mobility standpoint. Discussed with family and caregiver from facility the importance of elevation and use of the cast shoe to minimize fall risk on the cast.  RN is aware. PLAN :  Discharge Recommendations: return to [facility]  Further Equipment Recommendations for Discharge: none     SUBJECTIVE:   Patient stated I am ready for lunch. 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 HX OTHER SURGICAL  4/12/15    ANTONIO, exp lap, sm bowel resection    HX OTHER SURGICAL  4/15/15    exp lap with wash out     HX OTHER SURGICAL      MOHS    HX OTHER SURGICAL      BACLOFEN PUMP INSERTED     Prior Level of Function/Home Situation: wheelchair for mobility and needs assist for all transfers  Personal factors and/or comorbidities impacting plan of care: TBI, increased tone throughout    [address]: Long term care  # Steps to Enter: 0  One/Two Story Residence: One story  Living Alone: No  Support Systems: Family member(s), Friends \ neighbors, Inpatient rehab, Long term acute care, Parent  Patient Expects to be Discharged to[de-identified] Assisted living ([facility] long term care fac.)  Current DME Used/Available at Home: Hospital bed, Wheelchair, Adaptive bathing aides, Adaptive dressing aides    EXAMINATION/PRESENTATION/DECISION MAKING:   Critical Behavior:  Neurologic State: Alert  Orientation Level: Oriented X4  Cognition: Follows commands     Hearing:     Skin:  Neurovascularly intact, pulse palpable inside cast  Edema: minimal  Range Of Motion:                          Strength: Tone & Sensation:                                  Coordination:     Vision:      Functional Mobility:  Bed Mobility:     Supine to Sit: Moderate assistance        Transfers:  Sit to Stand: Moderate assistance; Adaptive equipment; Additional time  Stand to Sit: Moderate assistance; Adaptive equipment; Additional time  Stand Pivot Transfers:  Moderate assistance                    Balance:   Sitting: Without support; Impaired  Sitting - Static: Fair (occasional)  Sitting - Dynamic: Poor (constant support)  Standing: Impaired; With support;Pull to stand  Standing - Static: Constant support;Poor  Standing - Dynamic : Poor  Ambulation/Gait Training:                                                            Functional Measure:  Barthel Index:    Bathin  Bladder: 0  Bowels: 0  Groomin  Dressin  Feedin  Mobility: 0  Stairs: 0  Toilet Use: 0  Transfer (Bed to Chair and Back): 5  Total: 10       Barthel and G-code impairment scale:  Percentage of impairment CH  0% CI  1-19% CJ  20-39% CK  40-59% CL  60-79% CM  80-99% CN  100%   Barthel Score 0-100 100 99-80 79-60 59-40 20-39 1-19   0   Barthel Score 0-20 20 17-19 13-16 9-12 5-8 1-4 0      The Barthel ADL Index: Guidelines  1. The index should be used as a record of what a patient does, not as a record of what a patient could do. 2. The main aim is to establish degree of independence from any help, physical or verbal, however minor and for whatever reason. 3. The need for supervision renders the patient not independent. 4. A patient's performance should be established using the best available evidence. Asking the patient, friends/relatives and nurses are the usual sources, but direct observation and common sense are also important. However direct testing is not needed. 5. Usually the patient's performance over the preceding 24-48 hours is important, but occasionally longer periods will be relevant. 6. Middle categories imply that the patient supplies over 50 per cent of the effort. 7. Use of aids to be independent is allowed. Nicol Lewis., Barthel, D.W. (1226). Functional evaluation: the Barthel Index. [facility] (14)2. Guido Moore melissa CHARLOTTE Hardy, Walter Puente., Jc Burr., Tangela, [address] ().  Measuring the change indisability after inpatient rehabilitation; comparison of the responsiveness of the Barthel Index and Functional [place] Measure. Journal of Neurology, Neurosurgery, and Psychiatry, 66(4), 258-244. ELKE Cleveland.ANTON, BRIANNA Brady, & Zamzam Lujan M.A. (2004.) Assessment of post-stroke quality of life in cost-effectiveness studies: The usefulness of the Barthel Index and the EuroQoL-5D. Quality of Life Research, 15, 493-62            Physical Therapy Evaluation Charge Determination   History Examination Presentation Decision-Making   MEDIUM  Complexity : 1-2 comorbidities / personal factors will impact the outcome/ POC  LOW Complexity : 1-2 Standardized tests and measures addressing body structure, function, activity limitation and / or participation in recreation  LOW Complexity : Stable, uncomplicated  LOW Complexity : FOTO score of       Based on the above components, the patient evaluation is determined to be of the following complexity level: LOW     Pain:  Pain Scale 1: Numeric (0 - 10)  Pain Intensity 1: 3  Pain Location 1: Foot  Activity Tolerance:   Good  Please refer to the flowsheet for vital signs taken during this treatment. After treatment:   [x]   Patient left in no apparent distress sitting up in chair  []   Patient left in no apparent distress in bed  [x]   Call bell left within reach  [x]   Nursing notified  [x]   Caregiver present  []   Bed alarm activated    COMMUNICATION/EDUCATION:   Communication/Collaboration:  [x]   Fall prevention education was provided and the patient/caregiver indicated understanding. [x]   Patient/family have participated as able and agree with findings and recommendations. []   Patient is unable to participate in plan of care at this time.   Findings and recommendations were discussed with: Registered Nurse    Thank you for this referral.  Bryn Dent, PT   Time Calculation: 32 mins

## 2017-12-01 NOTE — PROGRESS NOTES
Bedside shift change report given to Neelam (oncoming nurse) by Torey Walter (offgoing nurse). Report included the following information SBAR, Kardex, Intake/Output, MAR and Recent Results.

## 2017-12-01 NOTE — PROGRESS NOTES
9:49 PM  Bedside and Verbal shift change report given to Sanjay RN/Christopher RN (oncoming nurse) by Natalio Lynn RN (offgoing nurse). Report included the following information SBAR, Kardex, OR Summary, Procedure Summary, Intake/Output, MAR and Recent Results.

## 2017-12-01 NOTE — PROGRESS NOTES
Resting comfortably. Pain well tolerated, slept well overnight. Visit Vitals    BP 98/65    Pulse 79    Temp 97.6 °F (36.4 °C)    Resp 16    Ht 5' 8\" (1.727 m)    Wt 60.8 kg (134 lb)    SpO2 91%    BMI 20.37 kg/m2       No results found for this or any previous visit (from the past 12 hour(s)).       NAD  abd soft ntnd  nonlabored resp  LLE: cast in place, toes able to df 3/5, silt, perfused    50 yo F POD 1 sp left foot SPLATT transfer, achilles lengthening    WBAT LLE  Is dependent on wheelchair  Po pain meds  No chemoppx since baseline functional status  Home meds  Dispo: discharge today once cleared by PT

## 2017-12-02 NOTE — PROGRESS NOTES
[address] [place] [address] to [facility]                                                                        49 y.o.   male    [address]   Room: [address] [place] [address] JOINT  Unit Phone# :  [phone]      [place] [address]  Dept: [address] [facility] [address]: [phone]                    SITUATION     Admitted:  11/30/2017         Attending Provider:  No att. providers found       Consultations:  None    PCP:  Padmini Mccarthy MD   [phone]    Treatment Team: Care Manager: RADHA Armendariz; Utilization Review: Julia Snider    Admitting Dx:  Dayan Abts DEFORMITY LEFT FOOT   Traumatic brain injury ([facility]       Principal Problem: <principal problem not specified>    1 Day Post-Op of   Procedure(s):  LEFT ANTERIOR TIBIAL TENDON TRANSFER / ACHILLES TENDON LENGTHENING     BY: Sonal Sprague MD             ON: 11/30/2017                  Code Status: Prior                Advance Directives:   Advance Care Planning 12/1/2017   Patient's Healthcare Decision Maker is: Legal Next of Delia 68   Primary Decision Maker Name [facility]   Primary Decision Maker Phone Number [phone];  [phone]   Primary Decision Maker Relationship to Patient Parent   Confirm Advance Directive None   Patient Would Like to Complete Advance Directive No   Does the patient have other document types -    (Send w/patient)   No Doesnt Have       Isolation:  There are currently no Active Isolations       MDRO: No current active infections    Pain Medications given:  Acetaminophen 650mg    Last dose: 12/1/2017 at  [address] needed: no  Type of equipment:    (Not currently on dialysis)  (Not currently on dialysis)  (Not currently on dialysis)     BACKGROUND     Allergies:   Allergies   Allergen Reactions    Invega [Paliperidone] Unknown (comments)       Past Medical History:   Diagnosis Date    Cancer ([facility]     BCCA    [facility]     HX ANEMIA    Dysphagia 8/10/2010    GERD (gastroesophageal reflux disease)     Ill-defined condition     quadripalegia    Ill-defined condition 2001    TBI    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 8/10/2010    Mood disorder in conditions classified elsewhere 8/10/2010    Other specified transient mental disorders due to conditions classified elsewhere(293.89) 8/10/2010    Psychiatric disorder     ANXIETY    PUD (peptic ulcer disease)     HX    Quadriplegia, unspecified 8/10/2010    Skin lesion 2/10/2014    Small bowel obstruction 4/6/2015       Past Surgical History:   Procedure Laterality Date    ABDOMEN SURGERY PROC UNLISTED  2014    \"Ulcer surgery\"/Implantable Baclofen pump    COLONOSCOPY N/A 4/28/2017    COLONOSCOPY / EGD  [facility]formed [facility] at [facility] ENDOSCOPY    HX OTHER SURGICAL  4/12/15    ANTONIO, exp lap, sm bowel resection    HX OTHER SURGICAL  4/15/15    exp lap with wash out     HX OTHER SURGICAL      MOHS    HX OTHER SURGICAL      BACLOFEN PUMP INSERTED       No prescriptions prior to admission. Hard scripts included in transfer packet yes    Vaccinations:    Immunization History   Administered Date(s) Administered    Influenza Vaccine 11/07/2013, 12/22/2015, 10/16/2017    Influenza Vaccine (Quad) PF 11/07/2016    Pneumococcal Polysaccharide (PPSV-23) 05/04/2015    Tdap 11/07/2013, 02/22/2014       Readmission Risks:    Known Risks: patient has history of TBI, physical limitations that enhance risk for falls        The Charlson CoMorbitiy Index tool is an evidenced based tool that has more automatic generated information. The tool looks at many different items such as the age of the patient, how many times they were admitted in the last calendar year, current length of stay in the hospital and their diagnosis.  All of these items are pulled automatically from information documented in the chart from various places and will generate a score that predicts whether a patient is at low (less than 13), medium (13-20) or high (21 or greater) risk of being readmitted.         ASSESSMENT                Temp: 98.7 °F (37.1 °C) (12/01/17 0928) Pulse (Heart Rate): 81 (12/01/17 0928)     Resp Rate: 16 (12/01/17 0928)           BP: 107/76 (12/01/17 0928)     O2 Sat (%): 92 % (12/01/17 0928)     Weight: 60.8 kg (134 lb)    Height: 5' 8\" (172.7 cm) (11/30/17 0640)       If above not within 1 hour of discharge:    BP:_____  P:____  R:____ T:_____ O2 Sat: ___%  O2: ______    Active Orders   Diet    DIET REGULAR         Orientation: oriented to time, place, person and situation     Active Behaviors: None                                   Active Lines/Drains:  (Peg Tube / Valentin / CL or S/L?): no    Urinary Status: Incontinent briefs     Last BM:       Skin Integrity: Incision (comment)   Wound Foot Left-DRESSING STATUS: Clean, dry, and intact    Wound Foot Left-DRESSING TYPE: Cast padding    Mobility: Very limited   Weight Bearing Status: NWB (Non Weight Bearing)                Lab Results   Component Value Date/Time    Glucose 90 11/27/2017 12:16 PM    Hemoglobin A1c 5.1 11/08/2017 11:39 AM    INR 1.0 04/23/2015 03:48 AM    HGB 14.1 11/27/2017 12:16 PM    HGB 15.0 11/08/2017 11:39 AM    Hemoglobin A1c, External 5.7 01/28/2017        RECOMMENDATION     See After Visit Summary (AVS) for:  · Discharge instructions  · After [address]   · Special equipment needed (entered pre-discharge by Care Management)  · Medication Reconciliation    · Follow up Appointment(s)         Report given/sent by:  Donte Dunn                    Verbal report given to: Dr. Sia Whitt at [facility] [place]  FAXED to:  See CM note         Estimated discharge time:  12/1/2017 at 1430

## 2018-05-01 ENCOUNTER — TELEPHONE (OUTPATIENT)
Dept: FAMILY MEDICINE CLINIC | Age: 48
End: 2018-05-01

## 2018-05-01 DIAGNOSIS — E55.9 VITAMIN D DEFICIENCY: ICD-10-CM

## 2018-05-01 DIAGNOSIS — Z12.5 SCREENING PSA (PROSTATE SPECIFIC ANTIGEN): ICD-10-CM

## 2018-05-01 DIAGNOSIS — R73.03 PRE-DIABETES: ICD-10-CM

## 2018-05-01 DIAGNOSIS — R53.81 MALAISE: Primary | ICD-10-CM

## 2018-05-01 DIAGNOSIS — Z79.899 MEDICATION MANAGEMENT: ICD-10-CM

## 2018-05-01 NOTE — TELEPHONE ENCOUNTER
Patient has appointment 5/9/2018 Roula from [facility] email me needs fasting labs so patient can come in for labs only     Per Dr Eb arvizu to order CBC, CMP, PSA, vitamin d, tsh  lithium

## 2018-05-03 ENCOUNTER — LAB ONLY (OUTPATIENT)
Dept: FAMILY MEDICINE CLINIC | Age: 48
End: 2018-05-03

## 2018-05-03 DIAGNOSIS — Z79.899 MEDICATION MANAGEMENT: ICD-10-CM

## 2018-05-03 NOTE — LETTER
5/7/2018 12:42 PM 
 
Mr. Alena Arroyo [address] [facility] [address] [place] [address] Dear Alena Arroyo: 
 
Please find your most recent results below. Resulted Orders LITHIUM Result Value Ref Range Lithium level 0.9 0.6 - 1.2 mmol/L Comment:  
                                    Detection Limit = 0.1 
                          <0.1 indicates None Detected Narrative Performed at:  [address]  [id number] : Garfield Acosta MD, Phone:  [phone] Please call me if you have any questions: [phone] Sincerely, Vahid Schaffer MD

## 2018-05-04 LAB
25(OH)D3+25(OH)D2 SERPL-MCNC: 31.8 NG/ML (ref 30–100)
ALBUMIN SERPL-MCNC: 4 G/DL (ref 3.5–5.5)
ALBUMIN/GLOB SERPL: 2 {RATIO} (ref 1.2–2.2)
ALP SERPL-CCNC: 88 IU/L (ref 39–117)
ALT SERPL-CCNC: 16 IU/L (ref 0–44)
AST SERPL-CCNC: 15 IU/L (ref 0–40)
BASOPHILS # BLD AUTO: 0 X10E3/UL (ref 0–0.2)
BASOPHILS NFR BLD AUTO: 0 %
BILIRUB SERPL-MCNC: 0.5 MG/DL (ref 0–1.2)
BUN SERPL-MCNC: 15 MG/DL (ref 6–24)
BUN/CREAT SERPL: 21 (ref 9–20)
CALCIUM SERPL-MCNC: 9 MG/DL (ref 8.7–10.2)
CHLORIDE SERPL-SCNC: 104 MMOL/L (ref 96–106)
CO2 SERPL-SCNC: 22 MMOL/L (ref 18–29)
CREAT SERPL-MCNC: 0.7 MG/DL (ref 0.76–1.27)
EOSINOPHIL # BLD AUTO: 0.2 X10E3/UL (ref 0–0.4)
EOSINOPHIL NFR BLD AUTO: 2 %
ERYTHROCYTE [DISTWIDTH] IN BLOOD BY AUTOMATED COUNT: 14.6 % (ref 12.3–15.4)
GFR SERPLBLD CREATININE-BSD FMLA CKD-EPI: 111 ML/MIN/1.73
GFR SERPLBLD CREATININE-BSD FMLA CKD-EPI: 128 ML/MIN/1.73
GLOBULIN SER CALC-MCNC: 2 G/DL (ref 1.5–4.5)
GLUCOSE SERPL-MCNC: 91 MG/DL (ref 65–99)
HCT VFR BLD AUTO: 44.8 % (ref 37.5–51)
HGB BLD-MCNC: 14.2 G/DL (ref 13–17.7)
IMM GRANULOCYTES # BLD: 0 X10E3/UL (ref 0–0.1)
IMM GRANULOCYTES NFR BLD: 0 %
LITHIUM SERPL-SCNC: 0.9 MMOL/L (ref 0.6–1.2)
LYMPHOCYTES # BLD AUTO: 1.5 X10E3/UL (ref 0.7–3.1)
LYMPHOCYTES NFR BLD AUTO: 16 %
MCH RBC QN AUTO: 28.6 PG (ref 26.6–33)
MCHC RBC AUTO-ENTMCNC: 31.7 G/DL (ref 31.5–35.7)
MCV RBC AUTO: 90 FL (ref 79–97)
MONOCYTES # BLD AUTO: 0.6 X10E3/UL (ref 0.1–0.9)
MONOCYTES NFR BLD AUTO: 7 %
NEUTROPHILS # BLD AUTO: 6.7 X10E3/UL (ref 1.4–7)
NEUTROPHILS NFR BLD AUTO: 75 %
PLATELET # BLD AUTO: 330 X10E3/UL (ref 150–379)
POTASSIUM SERPL-SCNC: 4.8 MMOL/L (ref 3.5–5.2)
PROT SERPL-MCNC: 6 G/DL (ref 6–8.5)
PSA SERPL-MCNC: 0.8 NG/ML (ref 0–4)
RBC # BLD AUTO: 4.96 X10E6/UL (ref 4.14–5.8)
REFLEX CRITERIA: NORMAL
SODIUM SERPL-SCNC: 139 MMOL/L (ref 134–144)
TSH SERPL DL<=0.005 MIU/L-ACNC: 2.4 UIU/ML (ref 0.45–4.5)
WBC # BLD AUTO: 9 X10E3/UL (ref 3.4–10.8)

## 2018-05-09 ENCOUNTER — OFFICE VISIT (OUTPATIENT)
Dept: FAMILY MEDICINE CLINIC | Age: 48
End: 2018-05-09

## 2018-05-09 VITALS
DIASTOLIC BLOOD PRESSURE: 74 MMHG | WEIGHT: 134 LBS | HEART RATE: 59 BPM | HEIGHT: 68 IN | RESPIRATION RATE: 18 BRPM | SYSTOLIC BLOOD PRESSURE: 138 MMHG | BODY MASS INDEX: 20.31 KG/M2 | OXYGEN SATURATION: 96 % | TEMPERATURE: 97.4 F

## 2018-05-09 DIAGNOSIS — R73.09 ELEVATED GLUCOSE: Primary | ICD-10-CM

## 2018-05-09 DIAGNOSIS — S06.9X0A TRAUMATIC BRAIN INJURY, WITHOUT LOSS OF CONSCIOUSNESS, INITIAL ENCOUNTER (HCC): ICD-10-CM

## 2018-05-09 DIAGNOSIS — F06.30 MOOD DISORDER IN CONDITIONS CLASSIFIED ELSEWHERE: ICD-10-CM

## 2018-05-09 DIAGNOSIS — E55.9 VITAMIN D DEFICIENCY: ICD-10-CM

## 2018-05-09 DIAGNOSIS — Z00.00 ENCOUNTER FOR MEDICARE ANNUAL WELLNESS EXAM: ICD-10-CM

## 2018-05-09 PROBLEM — S06.9XAA TRAUMATIC BRAIN INJURY: Status: RESOLVED | Noted: 2017-11-30 | Resolved: 2018-05-09

## 2018-05-09 LAB — HBA1C MFR BLD HPLC: 5.3 %

## 2018-05-09 RX ORDER — QUETIAPINE FUMARATE 100 MG/1
100 TABLET, FILM COATED ORAL DAILY
COMMUNITY

## 2018-05-09 RX ORDER — ASPIRIN 325 MG
TABLET, DELAYED RELEASE (ENTERIC COATED) ORAL
COMMUNITY

## 2018-05-09 NOTE — PROGRESS NOTES
This is the Subsequent Medicare Annual Wellness Exam, performed 12 months or more after the Initial AWV or the last Subsequent AWV    I have reviewed the patient's medical history in detail and updated the computerized patient record. 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 8/10/2010    Mood disorder in conditions classified elsewhere 8/10/2010    Other specified transient mental disorders due to conditions classified elsewhere(293.89) 8/10/2010    Psychiatric disorder     ANXIETY    PUD (peptic ulcer disease)     HX    Quadriplegia, unspecified ([facility] [address] 8/10/2010    Skin lesion 2/10/2014    Small bowel obstruction ([facility] [address] 4/6/2015      Past Surgical History:   Procedure Laterality Date    ABDOMEN SURGERY PROC UNLISTED  2014    \"Ulcer surgery\"/Implantable Baclofen pump    COLONOSCOPY N/A 4/28/2017    COLONOSCOPY / EGD  performed by Mariajose Stockton MD at [address] HX OTHER SURGICAL  4/12/15    ANTONIO, exp lap, sm bowel resection    HX OTHER SURGICAL  4/15/15    exp lap with wash out     HX OTHER SURGICAL      MOHS    HX OTHER SURGICAL      BACLOFEN PUMP INSERTED     Current Outpatient Prescriptions   Medication Sig Dispense Refill    vortioxetine (TRINTELLIX) 20 mg tablet Take  by mouth daily.  multivitamin, tx-iron-ca-min (THERA-M W/ IRON) 9 mg iron-400 mcg tab tablet Take 1 Tab by mouth daily.  QUEtiapine (SEROQUEL) 100 mg tablet Take 100 mg by mouth daily.  cholecalciferol (VITAMIN D3) 50,000 unit capsule Take  by mouth every seven (7) days.  diazePAM (VALIUM) 5 mg tablet Take 1 Tab by mouth every six (6) hours as needed.  Max Daily Amount: 20 mg. 30 Tab 1    eszopiclone (LUNESTA) 3 mg tablet Take  by mouth nightly.  omeprazole (PRILOSEC) 40 mg capsule Take 40 mg by mouth daily.  B.infantis-B.ani-B.long-B.bifi (PROBIOTIC 4X) 10-15 mg TbEC Take  by mouth daily.  LORazepam (ATIVAN) 2 mg tablet Take  by mouth every eight (8) hours as needed for Anxiety.  senna (SENEXON) 8.6 mg tablet Take 1 Tab by mouth two (2) times a day.  melatonin 3 mg tablet Take 1 tablet by mouth 2 hours prior to bedtime      trospium (SANCTURA) 20 mg tablet Take  by mouth two (2) times a day.  ALPRAZolam (XANAX) 0.5 mg tablet Take  by mouth. Take 1 tablet by mouth 30 minutes prior to CT scan for sedation      clonazePAM (KLONOPIN) 0.5 mg tablet Take  by mouth two (2) times a day.  bisacodyl (BISAC-EVAC) 10 mg suppository Insert 10 mg into rectum nightly.  lithium carbonate SR (LITHOBID) 300 mg CR tablet Take 300 mg by mouth four (4) times daily.  traZODone (DESYREL) 300 mg tablet Take 300 mg by mouth nightly.  propranolol (INDERAL) 80 mg tablet Take 60 mg by mouth two (2) times a day. Hold if systolic  Pressure is below 100      ergocalciferol (VITAMIN D) 50,000 unit capsule Take 50,000 Units by mouth every seven (7) days. On Mondays      budesonide (RHINOCORT AQUA) 32 mcg/Actuation nasal spray 2 Sprays by Both Nostrils route two (2) times a day.  oxyCODONE-acetaminophen (PERCOCET) 5-325 mg per tablet Take 1 Tab by mouth every four (4) hours as needed. Max Daily Amount: 6 Tabs. 50 Tab 0    bromocriptine (PARLODEL) 2.5 mg tablet Take 2.5 mg by mouth two (2) times a day.        Allergies   Allergen Reactions    Invega [Paliperidone] Unknown (comments)     Family History   Problem Relation Age of Onset    Asthma Mother     Cancer Mother      BREAST    Cancer Father      Waldenstroms macroglobulinemia     Arthritis-osteo Father     Elevated Lipids Father     Hypertension Father     Asthma Brother     Anesth Problems Neg Hx      Social History Substance Use Topics    Smoking status: Never Smoker    Smokeless tobacco: Never Used    Alcohol use No      Comment: prior alcoholic     Patient Active Problem List   Diagnosis Code    Mood disorder in conditions classified elsewhere F06.30    Dysphagia R13.10    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 9X9S    Vitamin D deficiency E55.9    Skin lesion L98.9    Prediabetes R73.03    Advanced care planning/counseling discussion Z70.80    TBI (traumatic brain injury) ([facility] [address] S06. 9X9A       Depression Risk Factor Screening:     PHQ over the last two weeks 5/9/2018   Little interest or pleasure in doing things Not at all   Feeling down, depressed or hopeless Not at all   Total Score PHQ 2 0     Alcohol Risk Factor Screening:    do not drink alcohol or very rarely. Functional Ability and Level of Safety:   }    Activities of Daily Living  The home contains: handrails and grab bars  Patient needs help with:  phone, transportation, shopping, preparing meals, laundry, housework, managing medications, managing money, eating, dressing, bathing, hygiene and bathroom needs    Fall Risk  Fall Risk Assessment, last 12 mths 5/9/2018   Able to walk? No       Abuse Screen  Patient is not abused    Cognitive Screening   Evaluation of Cognitive Function:      Patient Care Team   Patient Care Team:  Winsome Spangler MD as PCP - General    Assessment/Plan   Education and counseling provided:  Are appropriate based on today's review and evaluation    Diagnoses and all orders for this visit:    1. Elevated glucose  -     AMB POC HEMOGLOBIN A1C    2. Traumatic brain injury, without loss of consciousness, initial encounter [facility])  Assessment & Plan:   This condition is managed by Specialist.  Lab Results   Component Value Date/Time    WBC 9.0 05/03/2018 08:10 AM    HGB 14.2 05/03/2018 08:10 AM    HCT 44.8 05/03/2018 08:10 AM    PLATELET 220 91/16/0807 08:10 AM    Creatinine 0.70 05/03/2018 08:10 AM    BUN 15 05/03/2018 08:10 AM    Potassium 4.8 05/03/2018 08:10 AM         3. Vitamin D deficiency    4. Mood disorder in conditions classified elsewhere    5.  Encounter for Medicare annual wellness exam      Health Maintenance Due   Topic Date Due    MEDICARE YEARLY EXAM  03/28/2018    HEMOGLOBIN A1C Q6M  05/08/2018

## 2018-05-09 NOTE — PROGRESS NOTES
HISTORY OF PRESENT ILLNESS  Kinga Junior is a 51 y.o. male. Blood pressure 138/74, pulse (!) 59, temperature 97.4 °F (36.3 °C), temperature source Oral, resp. rate 18, height 5' 8\" (1.727 m), weight 134 lb (60.8 kg), SpO2 96 %. Body mass index is 20.37 kg/(m^2). Chief Complaint   Patient presents with    Annual Wellness Visit     [facility] patient         HPI  Kinga Junior 51 y.o. male  presents to the office today for annual wellness visit. [facility] is a [facility] by Dr. Dami Lockwood ([address] [facility]). Pt presents with caregiver at bedside. Vitamin D deficiency: Pt's vitamin D levels were 31.8 on 18. Pt continues with Vitamin D 50,000 units weekly. Stable, continue current regimen. Health maintenance: Pt has no complaints today in office. I have asked pt a series of questions related to [employer]. Current Outpatient Prescriptions   Medication Sig Dispense Refill    vortioxetine (TRINTELLIX) 20 mg tablet Take  by mouth daily.  multivitamin, tx-iron-ca-min (THERA-M W/ IRON) 9 mg iron-400 mcg tab tablet Take 1 Tab by mouth daily.  QUEtiapine (SEROQUEL) 100 mg tablet Take 100 mg by mouth daily.  cholecalciferol (VITAMIN D3) 50,000 unit capsule Take  by mouth every seven (7) days.  diazePAM (VALIUM) 5 mg tablet Take 1 Tab by mouth every six (6) hours as needed. Max Daily Amount: 20 mg. 30 Tab 1    eszopiclone (LUNESTA) 3 mg tablet Take  by mouth nightly.  omeprazole (PRILOSEC) 40 mg capsule Take 40 mg by mouth daily.  B.infantis-B.ani-B.long-B.bifi (PROBIOTIC 4X) 10-15 mg TbEC Take  by mouth daily.  LORazepam (ATIVAN) 2 mg tablet Take  by mouth every eight (8) hours as needed for Anxiety.  senna (SENEXON) 8.6 mg tablet Take 1 Tab by mouth two (2) times a day.  melatonin 3 mg tablet Take 1 tablet by mouth 2 hours prior to bedtime      trospium (SANCTURA) 20 mg tablet Take  by mouth two (2) times a day.       ALPRAZolam Paulla Page) 0.5 mg tablet Take  by mouth. Take 1 tablet by mouth 30 minutes prior to CT scan for sedation      clonazePAM (KLONOPIN) 0.5 mg tablet Take  by mouth two (2) times a day.  bisacodyl (BISAC-EVAC) 10 mg suppository Insert 10 mg into rectum nightly.  lithium carbonate SR (LITHOBID) 300 mg CR tablet Take 300 mg by mouth four (4) times daily.  traZODone (DESYREL) 300 mg tablet Take 300 mg by mouth nightly.  propranolol (INDERAL) 80 mg tablet Take 60 mg by mouth two (2) times a day. Hold if systolic  Pressure is below 100      ergocalciferol (VITAMIN D) 50,000 unit capsule Take 50,000 Units by mouth every seven (7) days. On Mondays      budesonide (RHINOCORT AQUA) 32 mcg/Actuation nasal spray 2 Sprays by Both Nostrils route two (2) times a day.  oxyCODONE-acetaminophen (PERCOCET) 5-325 mg per tablet Take 1 Tab by mouth every four (4) hours as needed. Max Daily Amount: 6 Tabs. 50 Tab 0    bromocriptine (PARLODEL) 2.5 mg tablet Take 2.5 mg by mouth two (2) times a day.        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 8/10/2010    Mood disorder in conditions classified elsewhere 8/10/2010    Other specified transient mental disorders due to conditions classified elsewhere(293.89) 8/10/2010    Psychiatric disorder     ANXIETY    PUD (peptic ulcer disease)     HX    Quadriplegia, unspecified ([facility] [address] 8/10/2010    Skin lesion 2/10/2014    Small bowel obstruction ([facility] [address] 4/6/2015     Past Surgical History:   Procedure Laterality Date    ABDOMEN SURGERY PROC UNLISTED  2014    \"Ulcer surgery\"/Implantable Baclofen pump    COLONOSCOPY N/A 4/28/2017    COLONOSCOPY / EGD  performed by Melody Wang MD at [address] HX OTHER SURGICAL  4/12/15    ANTONIO, exp lap, sm bowel resection    HX OTHER SURGICAL  4/15/15    exp lap with wash out     HX OTHER SURGICAL      MOHS    HX OTHER SURGICAL      BACLOFEN PUMP INSERTED     Family History   Problem Relation Age of Onset    Asthma Mother     Cancer Mother      BREAST    Cancer Father      Waldenstroms macroglobulinemia     Arthritis-osteo Father     Elevated Lipids Father     Hypertension Father     Asthma Brother     Anesth Problems Neg Hx      Social History   Substance Use Topics    Smoking status: Never Smoker    Smokeless tobacco: Never Used    Alcohol use No      Comment: prior alcoholic        Review of Systems   Unable to perform ROS: Other       Physical Exam   Constitutional: He appears well-developed and well-nourished. No distress. HENT:   Head: Normocephalic and atraumatic. Right Ear: External ear normal.   Left Ear: External ear normal.   Nose: Nose normal.   Mouth/Throat: Oropharynx is clear and moist. No oropharyngeal exudate. Eyes: Conjunctivae and EOM are normal. Pupils are equal, round, and reactive to light. Right eye exhibits no discharge. Left eye exhibits no discharge. No scleral icterus. Neck: Normal range of motion. Neck supple. No JVD present. No tracheal deviation present. No thyromegaly present. Cardiovascular: Normal rate, regular rhythm, normal heart sounds and intact distal pulses. Exam reveals no gallop and no friction rub. No murmur heard. Pulmonary/Chest: Effort normal and breath sounds normal. No stridor. He has no wheezes. He has no rales. He exhibits no tenderness. Abdominal: Soft. Bowel sounds are normal. He exhibits no distension and no mass. There is no tenderness. There is no rebound and no guarding. Musculoskeletal: Normal range of motion. He exhibits no edema or tenderness.    Lymphadenopathy:     He has no cervical adenopathy. Neurological: He is alert. He has normal reflexes. No cranial nerve deficit. He exhibits normal muscle tone. Coordination normal.   Skin: Skin is warm and dry. No rash noted. He is not diaphoretic. No erythema. No pallor. Nursing note and vitals reviewed. ASSESSMENT and PLAN  Diagnoses and all orders for this visit:    1. Elevated glucose  -     AMB POC HEMOGLOBIN A1C  - Advised pt to continue to work on his diet. 2. Traumatic brain injury, without loss of consciousness, initial encounter [facility])  Assessment & Plan: This condition is managed by Specialist.  Lab Results   Component Value Date/Time    WBC 9.0 05/03/2018 08:10 AM    HGB 14.2 05/03/2018 08:10 AM    HCT 44.8 05/03/2018 08:10 AM    PLATELET 328 61/17/3109 08:10 AM    Creatinine 0.70 05/03/2018 08:10 AM    BUN 15 05/03/2018 08:10 AM    Potassium 4.8 05/03/2018 08:10 AM         3. Vitamin D deficiency       - Pt's vitamin D levels were 31.8 on 05/03/18. Pt continues with Vitamin D 50,000 units weekly. Stable, continue current regimen. 4. Mood disorder in conditions classified elsewhere       - Pt is followed by Dr. Frankey Dame (Physiatrist). 5. Encounter for Medicare annual wellness exam        - I have asked pt a series of questions related to [facility] Wellness. Follow-up Disposition:  Return in about 6 months (around 11/9/2018) for hypertension follow up. Medication risks/benefits/costs/interactions/alternatives discussed with patient. Advised patient to call back or return to office if symptoms worsen/change/persist.  If patient cannot reach us or should anything more severe/urgent arise he/she should proceed directly to the nearest emergency department. Discussed expected course/resolution/complications of diagnosis in detail with patient. Patient given a written after visit summary which includes her diagnoses, current medications and vitals.   Patient expressed understanding with the diagnosis and plan.  Written by stephen Nieves, as dictated by Abraham Avalos M.D.   I have reviewed and agree with the above note and have made corrections where appropriate, Dr. Juan Turner MD

## 2018-05-09 NOTE — PATIENT INSTRUCTIONS

## 2018-05-09 NOTE — PROGRESS NOTES
Chief Complaint   Patient presents with    Annual Wellness Visit     [facility] patient    Reviewed Record in preparation for visit and have obtained necessary documentation. Identified pt with two pt identifiers (Name @ )    Health Maintenance Due   Topic    MEDICARE YEARLY EXAM     HEMOGLOBIN A1C Q6M          1. Have you been to the ER, urgent care clinic since your last visit? Hospitalized since your last visit? No    2. Have you seen or consulted any other health care providers outside of the [facility] since your last visit? Include any pap smears or colon screening.  No

## 2018-05-09 NOTE — ASSESSMENT & PLAN NOTE
This condition is managed by Specialist.  Lab Results   Component Value Date/Time    WBC 9.0 05/03/2018 08:10 AM    HGB 14.2 05/03/2018 08:10 AM    HCT 44.8 05/03/2018 08:10 AM    PLATELET 565 83/94/8646 08:10 AM    Creatinine 0.70 05/03/2018 08:10 AM    BUN 15 05/03/2018 08:10 AM    Potassium 4.8 05/03/2018 08:10 AM

## 2018-05-14 NOTE — TELEPHONE ENCOUNTER
Inform pt to go to my chart to see results and recommendations    Labs are stable  Please inform caregiver of results    Any questions let me know

## 2018-10-31 ENCOUNTER — TELEPHONE (OUTPATIENT)
Dept: ONCOLOGY | Age: 48
End: 2018-10-31

## 2018-10-31 NOTE — TELEPHONE ENCOUNTER
Call returned to Roula with [facility], spoke with Lara Dee with [facility], advised of note by provider. Thanked for return call.

## 2018-10-31 NOTE — TELEPHONE ENCOUNTER
Patient is scheduled for a follow up 11/20. Patient normally takes a valium before every office visit to keep his nerves calm, he is not combative. Roula with [facility] would like to know if patient can take that medication prior to appointment. Please return call to discuss [phone].   merary

## 2018-11-01 ENCOUNTER — LAB ONLY (OUTPATIENT)
Dept: FAMILY MEDICINE CLINIC | Age: 48
End: 2018-11-01

## 2018-11-01 DIAGNOSIS — E78.2 ELEVATED CHOLESTEROL WITH ELEVATED TRIGLYCERIDES: ICD-10-CM

## 2018-11-01 DIAGNOSIS — R73.03 PREDIABETES: ICD-10-CM

## 2018-11-01 DIAGNOSIS — E55.9 VITAMIN D DEFICIENCY: Primary | ICD-10-CM

## 2018-11-01 NOTE — PROGRESS NOTES
Patients has appointment 11/7/2018 and needs labs done prior to appointment per Dr. Cathy arvizu to order labs cbc,cmp,lipid,vitamin d

## 2018-11-02 LAB
25(OH)D3+25(OH)D2 SERPL-MCNC: 38.6 NG/ML (ref 30–100)
ALBUMIN SERPL-MCNC: 4.3 G/DL (ref 3.5–5.5)
ALBUMIN/GLOB SERPL: 2.3 {RATIO} (ref 1.2–2.2)
ALP SERPL-CCNC: 94 IU/L (ref 39–117)
ALT SERPL-CCNC: 16 IU/L (ref 0–44)
AST SERPL-CCNC: 13 IU/L (ref 0–40)
BASOPHILS # BLD AUTO: 0 X10E3/UL (ref 0–0.2)
BASOPHILS NFR BLD AUTO: 0 %
BILIRUB SERPL-MCNC: 0.5 MG/DL (ref 0–1.2)
BUN SERPL-MCNC: 21 MG/DL (ref 6–24)
BUN/CREAT SERPL: 28 (ref 9–20)
CALCIUM SERPL-MCNC: 9 MG/DL (ref 8.7–10.2)
CHLORIDE SERPL-SCNC: 106 MMOL/L (ref 96–106)
CHOLEST SERPL-MCNC: 190 MG/DL (ref 100–199)
CO2 SERPL-SCNC: 21 MMOL/L (ref 20–29)
CREAT SERPL-MCNC: 0.75 MG/DL (ref 0.76–1.27)
EOSINOPHIL # BLD AUTO: 0.1 X10E3/UL (ref 0–0.4)
EOSINOPHIL NFR BLD AUTO: 2 %
ERYTHROCYTE [DISTWIDTH] IN BLOOD BY AUTOMATED COUNT: 14.6 % (ref 12.3–15.4)
EST. AVERAGE GLUCOSE BLD GHB EST-MCNC: 123 MG/DL
GLOBULIN SER CALC-MCNC: 1.9 G/DL (ref 1.5–4.5)
GLUCOSE SERPL-MCNC: 91 MG/DL (ref 65–99)
HBA1C MFR BLD: 5.9 % (ref 4.8–5.6)
HCT VFR BLD AUTO: 43.1 % (ref 37.5–51)
HDLC SERPL-MCNC: 61 MG/DL
HGB BLD-MCNC: 13.7 G/DL (ref 13–17.7)
IMM GRANULOCYTES # BLD: 0 X10E3/UL (ref 0–0.1)
IMM GRANULOCYTES NFR BLD: 0 %
INTERPRETATION, 910389: NORMAL
LDLC SERPL CALC-MCNC: 109 MG/DL (ref 0–99)
LYMPHOCYTES # BLD AUTO: 1.7 X10E3/UL (ref 0.7–3.1)
LYMPHOCYTES NFR BLD AUTO: 22 %
MCH RBC QN AUTO: 26.6 PG (ref 26.6–33)
MCHC RBC AUTO-ENTMCNC: 31.8 G/DL (ref 31.5–35.7)
MCV RBC AUTO: 84 FL (ref 79–97)
MONOCYTES # BLD AUTO: 0.5 X10E3/UL (ref 0.1–0.9)
MONOCYTES NFR BLD AUTO: 7 %
NEUTROPHILS # BLD AUTO: 5.3 X10E3/UL (ref 1.4–7)
NEUTROPHILS NFR BLD AUTO: 69 %
PLATELET # BLD AUTO: 309 X10E3/UL (ref 150–379)
POTASSIUM SERPL-SCNC: 4.8 MMOL/L (ref 3.5–5.2)
PROT SERPL-MCNC: 6.2 G/DL (ref 6–8.5)
RBC # BLD AUTO: 5.16 X10E6/UL (ref 4.14–5.8)
SODIUM SERPL-SCNC: 143 MMOL/L (ref 134–144)
TRIGL SERPL-MCNC: 99 MG/DL (ref 0–149)
VLDLC SERPL CALC-MCNC: 20 MG/DL (ref 5–40)
WBC # BLD AUTO: 7.7 X10E3/UL (ref 3.4–10.8)

## 2018-11-07 ENCOUNTER — OFFICE VISIT (OUTPATIENT)
Dept: FAMILY MEDICINE CLINIC | Age: 48
End: 2018-11-07

## 2018-11-07 VITALS
DIASTOLIC BLOOD PRESSURE: 70 MMHG | BODY MASS INDEX: 20.64 KG/M2 | HEART RATE: 63 BPM | OXYGEN SATURATION: 97 % | RESPIRATION RATE: 16 BRPM | TEMPERATURE: 98.2 F | HEIGHT: 68 IN | WEIGHT: 136.2 LBS | SYSTOLIC BLOOD PRESSURE: 102 MMHG

## 2018-11-07 DIAGNOSIS — E78.2 ELEVATED CHOLESTEROL WITH ELEVATED TRIGLYCERIDES: ICD-10-CM

## 2018-11-07 DIAGNOSIS — E55.9 VITAMIN D DEFICIENCY: ICD-10-CM

## 2018-11-07 DIAGNOSIS — S06.9X0A TRAUMATIC BRAIN INJURY, WITHOUT LOSS OF CONSCIOUSNESS, INITIAL ENCOUNTER (HCC): ICD-10-CM

## 2018-11-07 DIAGNOSIS — Z23 ENCOUNTER FOR IMMUNIZATION: ICD-10-CM

## 2018-11-07 DIAGNOSIS — Z12.11 COLON CANCER SCREENING: ICD-10-CM

## 2018-11-07 DIAGNOSIS — R73.03 PREDIABETES: Primary | ICD-10-CM

## 2018-11-07 NOTE — PROGRESS NOTES
HISTORY OF PRESENT ILLNESS  Davide Zimmerman is a 51 y.o. male who presents today for hypertension 6 month follow up. Is a [facility] patient followed by Dr. Sergio Manuel (Physiatrist). Pt presents with caregiver at beside. Blood pressure 102/70, pulse 63, temperature 98.2 °F (36.8 °C), temperature source Oral, resp. rate 16, height 5' 8\" (1.727 m), weight 136 lb 3.2 oz (61.8 kg), SpO2 97 %. Body mass index is 20.71 kg/m². Chief Complaint   Patient presents with    Hypertension     6 month follow up      HPI  Hypertension: Bp at office today 102/70. Pt continues with propranolol 80 mg BID. Health Maintenance: Pt denies a family history of prostate cancer. Referral given to Dr. Kim Fernandez (gastroenterology) for colonoscopy. Pt received flu shot in office today. Current Outpatient Medications   Medication Sig Dispense Refill    vortioxetine (TRINTELLIX) 20 mg tablet Take  by mouth daily.  multivitamin, tx-iron-ca-min (THERA-M W/ IRON) 9 mg iron-400 mcg tab tablet Take 1 Tab by mouth daily.  QUEtiapine (SEROQUEL) 100 mg tablet Take 100 mg by mouth daily.  cholecalciferol (VITAMIN D3) 50,000 unit capsule Take  by mouth every seven (7) days.  diazePAM (VALIUM) 5 mg tablet Take 1 Tab by mouth every six (6) hours as needed. Max Daily Amount: 20 mg. 30 Tab 1    eszopiclone (LUNESTA) 3 mg tablet Take  by mouth nightly.  omeprazole (PRILOSEC) 40 mg capsule Take 40 mg by mouth daily.  B.infantis-B.ani-B.long-B.bifi (PROBIOTIC 4X) 10-15 mg TbEC Take  by mouth daily.  LORazepam (ATIVAN) 2 mg tablet Take  by mouth every eight (8) hours as needed for Anxiety.  senna (SENEXON) 8.6 mg tablet Take 1 Tab by mouth two (2) times a day.  melatonin 3 mg tablet Take 1 tablet by mouth 2 hours prior to bedtime      trospium (SANCTURA) 20 mg tablet Take  by mouth two (2) times a day.  ALPRAZolam (XANAX) 0.5 mg tablet Take  by mouth.  Take 1 tablet by mouth 30 minutes prior to CT scan for sedation      clonazePAM (KLONOPIN) 0.5 mg tablet Take  by mouth two (2) times a day.  bisacodyl (BISAC-EVAC) 10 mg suppository Insert 10 mg into rectum nightly.  lithium carbonate SR (LITHOBID) 300 mg CR tablet Take 300 mg by mouth four (4) times daily.  traZODone (DESYREL) 300 mg tablet Take 300 mg by mouth nightly.  propranolol (INDERAL) 80 mg tablet Take 60 mg by mouth two (2) times a day. Hold if systolic  Pressure is below 100      ergocalciferol (VITAMIN D) 50,000 unit capsule Take 50,000 Units by mouth every seven (7) days. On Mondays      budesonide (RHINOCORT AQUA) 32 mcg/Actuation nasal spray 2 Sprays by Both Nostrils route two (2) times a day.        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 8/10/2010    Mood disorder in conditions classified elsewhere 8/10/2010    Other specified transient mental disorders due to conditions classified elsewhere(293.89) 8/10/2010    Psychiatric disorder     ANXIETY    PUD (peptic ulcer disease)     HX    Quadriplegia, unspecified ([facility] [address] 8/10/2010    Skin lesion 2/10/2014    Small bowel obstruction ([facility] [address] 4/6/2015     Past Surgical History:   Procedure Laterality Date    ABDOMEN SURGERY PROC UNLISTED  2014    \"Ulcer surgery\"/Implantable Baclofen pump    HX OTHER SURGICAL  4/12/15    ANTONIO, exp lap, sm bowel resection    HX OTHER SURGICAL  4/15/15    exp lap with wash out     HX OTHER SURGICAL      MOHS    HX OTHER SURGICAL      BACLOFEN PUMP INSERTED     Family History   Problem Relation Age of Onset    Asthma Mother     Cancer Mother         BREAST    Cancer Father Waldenstroms macroglobulinemia     Arthritis-osteo Father     Elevated Lipids Father     Hypertension Father     Asthma Brother     Anesth Problems Neg Hx      Social History     Tobacco Use    Smoking status: Never Smoker    Smokeless tobacco: Never Used   Substance Use Topics    Alcohol use: No     Comment: prior alcoholic        Review of Systems   Unable to perform ROS: Other       Physical Exam   Constitutional: He appears well-developed and well-nourished. HENT:   Head: Normocephalic and atraumatic. Neck: Carotid bruit is not present. Cardiovascular: Normal rate, regular rhythm, normal heart sounds and intact distal pulses. Exam reveals no gallop and no friction rub. No murmur heard. Pulmonary/Chest: Effort normal and breath sounds normal. No respiratory distress. He has no wheezes. He has no rales. He exhibits no tenderness. Musculoskeletal:   Pt presents in wheelchair today. Neurological: He is alert. Nursing note and vitals reviewed. ASSESSMENT and PLAN  Diagnoses and all orders for this visit:    1. Prediabetes        -     A1C was elevated on 11/01. Advised caregiver to watch dietary intake. 2. Elevated cholesterol with elevated triglycerides        -     Cholesterol is stable. Advised to continue current regimen    3. Vitamin D deficiency        -      Vitamin D is at goal. Advised to continue current regimen    4. Traumatic brain injury, without loss of consciousness, initial encounter [facility])        -      TBI managed by Dr. Ramsey Gill    5. Encounter for immunization  -     INFLUENZA VIRUS VAC QUAD,SPLIT,PRESV FREE SYRINGE IM  -     ADMIN INFLUENZA VIRUS VAC    6. Colon cancer screening  -     [facility] Bentley- Dr. Hiwot Tam    Follow-up Disposition:  Return in about 6 months (around 5/7/2019). Medication risks/benefits/costs/interactions/alternatives discussed with patient.   Advised patient to call back or return to office if symptoms worsen/change/persist.  If patient cannot reach us or should anything more severe/urgent arise he/she should proceed directly to the nearest emergency department. Discussed expected course/resolution/complications of diagnosis in detail with patient. Patient given a written after visit summary which includes their diagnoses, current medications and vitals. Patient expressed understanding with the diagnosis and plan. Written by stephen Mathis, as dictated by Chevy Demarco M.D.  11:43 AM - 11:54 AM  Total time spent with the patient 11 minutes, greater than 50% of time spent counseling patient.

## 2018-11-07 NOTE — PATIENT INSTRUCTIONS

## 2018-11-07 NOTE — PROGRESS NOTES
Chief Complaint   Patient presents with    Hypertension     6 month follow up     \"REVIEWED RECORD IN PREPARATION FOR VISIT AND HAVE OBTAINED THE NECESSARY DOCUMENTATION\"  1. Have you been to the ER, urgent care clinic since your last visit? Hospitalized since your last visit? No    2. Have you seen or consulted any other health care providers outside of the [address] [place] [address] since your last visit? Include any pap smears or colon screening.  No

## 2018-11-20 ENCOUNTER — OFFICE VISIT (OUTPATIENT)
Dept: ONCOLOGY | Age: 48
End: 2018-11-20

## 2018-11-20 VITALS
OXYGEN SATURATION: 92 % | SYSTOLIC BLOOD PRESSURE: 112 MMHG | DIASTOLIC BLOOD PRESSURE: 80 MMHG | TEMPERATURE: 98.5 F | HEART RATE: 69 BPM | RESPIRATION RATE: 20 BRPM

## 2018-11-20 DIAGNOSIS — D50.0 IRON DEFICIENCY ANEMIA DUE TO CHRONIC BLOOD LOSS: Primary | ICD-10-CM

## 2018-11-20 DIAGNOSIS — S06.9X0A TRAUMATIC BRAIN INJURY, WITHOUT LOSS OF CONSCIOUSNESS, INITIAL ENCOUNTER (HCC): ICD-10-CM

## 2018-11-20 RX ORDER — ACETAMINOPHEN 325 MG/1
650 TABLET ORAL AS NEEDED
Status: CANCELLED
Start: 2018-12-06

## 2018-11-20 RX ORDER — ONDANSETRON 2 MG/ML
8 INJECTION INTRAMUSCULAR; INTRAVENOUS AS NEEDED
Status: CANCELLED | OUTPATIENT
Start: 2018-12-13

## 2018-11-20 RX ORDER — DIPHENHYDRAMINE HYDROCHLORIDE 50 MG/ML
50 INJECTION, SOLUTION INTRAMUSCULAR; INTRAVENOUS AS NEEDED
Status: CANCELLED
Start: 2018-12-13

## 2018-11-20 RX ORDER — SODIUM CHLORIDE 9 MG/ML
10 INJECTION INTRAMUSCULAR; INTRAVENOUS; SUBCUTANEOUS AS NEEDED
Status: CANCELLED | OUTPATIENT
Start: 2018-12-06

## 2018-11-20 RX ORDER — HYDROCORTISONE SODIUM SUCCINATE 100 MG/2ML
100 INJECTION, POWDER, FOR SOLUTION INTRAMUSCULAR; INTRAVENOUS AS NEEDED
Status: CANCELLED | OUTPATIENT
Start: 2018-12-13

## 2018-11-20 RX ORDER — DIPHENHYDRAMINE HYDROCHLORIDE 50 MG/ML
50 INJECTION, SOLUTION INTRAMUSCULAR; INTRAVENOUS AS NEEDED
Status: CANCELLED
Start: 2018-12-06

## 2018-11-20 RX ORDER — ACETAMINOPHEN 325 MG/1
650 TABLET ORAL AS NEEDED
Status: CANCELLED
Start: 2018-12-13

## 2018-11-20 RX ORDER — SODIUM CHLORIDE 9 MG/ML
10 INJECTION INTRAMUSCULAR; INTRAVENOUS; SUBCUTANEOUS AS NEEDED
Status: CANCELLED | OUTPATIENT
Start: 2018-12-13

## 2018-11-20 RX ORDER — ALBUTEROL SULFATE 0.83 MG/ML
2.5 SOLUTION RESPIRATORY (INHALATION) AS NEEDED
Status: CANCELLED
Start: 2018-12-13

## 2018-11-20 RX ORDER — HEPARIN 100 UNIT/ML
300-500 SYRINGE INTRAVENOUS AS NEEDED
Status: CANCELLED
Start: 2018-12-06

## 2018-11-20 RX ORDER — SODIUM CHLORIDE 0.9 % (FLUSH) 0.9 %
10 SYRINGE (ML) INJECTION AS NEEDED
Status: CANCELLED
Start: 2018-12-06

## 2018-11-20 RX ORDER — ONDANSETRON 2 MG/ML
8 INJECTION INTRAMUSCULAR; INTRAVENOUS AS NEEDED
Status: CANCELLED | OUTPATIENT
Start: 2018-12-06

## 2018-11-20 RX ORDER — HEPARIN 100 UNIT/ML
300-500 SYRINGE INTRAVENOUS AS NEEDED
Status: CANCELLED
Start: 2018-12-13

## 2018-11-20 RX ORDER — EPINEPHRINE 1 MG/ML
0.3 INJECTION, SOLUTION, CONCENTRATE INTRAVENOUS AS NEEDED
Status: CANCELLED | OUTPATIENT
Start: 2018-12-13

## 2018-11-20 RX ORDER — EPINEPHRINE 1 MG/ML
0.3 INJECTION, SOLUTION, CONCENTRATE INTRAVENOUS AS NEEDED
Status: CANCELLED | OUTPATIENT
Start: 2018-12-06

## 2018-11-20 RX ORDER — SODIUM CHLORIDE 0.9 % (FLUSH) 0.9 %
10 SYRINGE (ML) INJECTION AS NEEDED
Status: CANCELLED
Start: 2018-12-13

## 2018-11-20 RX ORDER — ALBUTEROL SULFATE 0.83 MG/ML
2.5 SOLUTION RESPIRATORY (INHALATION) AS NEEDED
Status: CANCELLED
Start: 2018-12-06

## 2018-11-20 RX ORDER — HYDROCORTISONE SODIUM SUCCINATE 100 MG/2ML
100 INJECTION, POWDER, FOR SOLUTION INTRAMUSCULAR; INTRAVENOUS AS NEEDED
Status: CANCELLED | OUTPATIENT
Start: 2018-12-06

## 2018-11-20 NOTE — PROGRESS NOTES
Hematology/Oncology Progress Note    REASON FOR VISIT: Iron deficiency anemia    HISTORY OF PRESENT ILLNESS: Mr. Chapo Rivera" is a 51 y.o. male who presents for follow-up of iron deficiency anemia due to chronic blood loss. He had a traumatic brain injury in 2001. He is a [facility]. He saw my partner Dr. Sowmya anguiano in February 2017 for iron deficiency anemia due to chronic blood loss. He has been under the care of Dr. Zach Fisher and Dr. Rodo Pulido. He did have bleeding ulcers in 2015. Iron infusions with Injectafer given on 3/27/17 and 4/3/17 with response. Had an EGD and colonoscopy on 4/28/17. Limited exam due to prep, but no clear sign of cancer or bleeding. CT abdomen 12/16 was unremarkable. Presents for follow-up. He is agitated, swearing and un co-operative during the visit. He does tell me he has no bleeding or health changes. Care giver is with him and unable to provide much information.      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 8/10/2010    Mood disorder in conditions classified elsewhere 8/10/2010    Other specified transient mental disorders due to conditions classified elsewhere(293.89) 8/10/2010    Psychiatric disorder     ANXIETY    PUD (peptic ulcer disease)     HX    Quadriplegia, unspecified ([address] 8/10/2010    Skin lesion 2/10/2014    Small bowel obstruction ([facility] 4/6/2015       Past Surgical History:   Procedure Laterality Date    ABDOMEN SURGERY PROC UNLISTED  2014    \"Ulcer surgery\"/Implantable Baclofen pump    HX OTHER SURGICAL  4/12/15    ANTONIO, exp lap, sm bowel resection    HX OTHER SURGICAL  4/15/15    exp lap with wash out     HX OTHER SURGICAL      MOHS    HX OTHER SURGICAL      BACLOFEN PUMP INSERTED       Allergies   Allergen Reactions    Invega [Paliperidone] Unknown (comments)       Current Outpatient Medications   Medication Sig Dispense Refill    multivitamin, tx-iron-ca-min (THERA-M W/ IRON) 9 mg iron-400 mcg tab tablet Take 1 Tab by mouth daily.  QUEtiapine (SEROQUEL) 100 mg tablet Take 100 mg by mouth daily.  cholecalciferol (VITAMIN D3) 50,000 unit capsule Take  by mouth every seven (7) days.  eszopiclone (LUNESTA) 3 mg tablet Take  by mouth nightly.  omeprazole (PRILOSEC) 40 mg capsule Take 40 mg by mouth daily.  B.infantis-B.ani-B.long-B.bifi (PROBIOTIC 4X) 10-15 mg TbEC Take  by mouth daily.  senna (SENEXON) 8.6 mg tablet Take 1 Tab by mouth two (2) times a day.  melatonin 3 mg tablet Take 1 tablet by mouth 2 hours prior to bedtime      trospium (SANCTURA) 20 mg tablet Take  by mouth two (2) times a day.  clonazePAM (KLONOPIN) 0.5 mg tablet Take  by mouth two (2) times a day.  bisacodyl (BISAC-EVAC) 10 mg suppository Insert 10 mg into rectum nightly.  traZODone (DESYREL) 300 mg tablet Take 300 mg by mouth nightly.  propranolol (INDERAL) 80 mg tablet Take 60 mg by mouth two (2) times a day. Hold if systolic  Pressure is below 100      budesonide (RHINOCORT AQUA) 32 mcg/Actuation nasal spray 2 Sprays by Both Nostrils route two (2) times a day.  vortioxetine (TRINTELLIX) 20 mg tablet Take  by mouth daily.  diazePAM (VALIUM) 5 mg tablet Take 1 Tab by mouth every six (6) hours as needed. Max Daily Amount: 20 mg. 30 Tab 1    LORazepam (ATIVAN) 2 mg tablet Take  by mouth every eight (8) hours as needed for Anxiety.  ALPRAZolam (XANAX) 0.5 mg tablet Take  by mouth. Take 1 tablet by mouth 30 minutes prior to CT scan for sedation      lithium carbonate SR (LITHOBID) 300 mg CR tablet Take 300 mg by mouth four (4) times daily.       ergocalciferol (VITAMIN D) 50,000 unit capsule Take 50,000 Units by mouth every seven (7) days. On Mondays         Social History     Socioeconomic History    Marital status: SINGLE     Spouse name: Not on file    Number of children: Not on file    Years of education: Not on file    Highest education level: Not on file   Social Needs    Financial resource strain: Not on file    Food insecurity - worry: Not on file    Food insecurity - inability: Not on file    Transportation needs - medical: Not on file   The Box needs - non-medical: Not on file   Occupational History    Not on file   Tobacco Use    Smoking status: Never Smoker    Smokeless tobacco: Never Used   Substance and Sexual Activity    Alcohol use: No     Comment: prior alcoholic    Drug use: No    Sexual activity: Not on file   Other Topics Concern    Not on file   Social History Narrative    Not on file       Family History   Problem Relation Age of Onset    Asthma Mother     Cancer Mother         BREAST    Cancer Father         Waldenstroms macroglobulinemia     Arthritis-osteo Father     Elevated Lipids Father     Hypertension Father     Asthma Brother     Anesth Problems Neg Hx      ROS  As per the HPI, otherwise a comprehensive ROS is negative. Physical Examination:   Visit Vitals  /80 (BP 1 Location: Left arm, BP Patient Position: Sitting)   Pulse 69   Temp 98.5 °F (36.9 °C) (Oral)   Resp 20   SpO2 92%     General appearance - alert, in wheelchair, patient is swearing inappropriately, agitated, in a wheelchair but trying to get out. Did not co operate for exam    LABS  Lab Results   Component Value Date/Time    WBC 7.7 11/01/2018 12:45 PM    HGB 13.7 11/01/2018 12:45 PM    HCT 43.1 11/01/2018 12:45 PM    PLATELET 871 90/41/7668 12:45 PM    MCV 84 11/01/2018 12:45 PM    ABS.  NEUTROPHILS 5.3 11/01/2018 12:45 PM     Lab Results   Component Value Date/Time    Sodium 143 11/01/2018 12:45 PM    Potassium 4.8 11/01/2018 12:45 PM    Chloride 106 11/01/2018 12:45 PM    CO2 21 11/01/2018 12:45 PM    Glucose 91 11/01/2018 12:45 PM    BUN 21 11/01/2018 12:45 PM    Creatinine 0.75 (L) 11/01/2018 12:45 PM    GFR est  11/01/2018 12:45 PM    GFR est non- 11/01/2018 12:45 PM    Calcium 9.0 11/01/2018 12:45 PM     Lab Results   Component Value Date/Time    AST (SGOT) 13 11/01/2018 12:45 PM    Alk. phosphatase 94 11/01/2018 12:45 PM    Protein, total 6.2 11/01/2018 12:45 PM    Albumin 4.3 11/01/2018 12:45 PM    Globulin 2.5 11/27/2017 12:16 PM    A-G Ratio 2.3 (H) 11/01/2018 12:45 PM     IMAGING  CT abdomen on 12/20/16 with marked distention of the rectum and sigmoid colon which is filled with fecal material.: Just proximal to this is air-filled and distended. ASSESSMENT  Mr. Travis Waddell is a 51 y.o. male who presents for evaluation of anemia. DISCUSSION/PLAN  1. Anemia. Long h/o iron deficiency  Negative EGD in 2015  CT negative 2016  He needs to have a colonoscopy and he will review this with Dr. Krishna Lebron    Though his hemoglobin is normal his Ferritin ( under media) has dropped to 8    Will give injectafer x 2      2. Weight loss. Has seen GI in the past. Seems to have slowed down. He needs a colonoscopy    The best  is  Joel Li - [phone]. RTC prn    Landon Cheung MD    Mr. Travis Waddell has a reminder for a \"due or due soon\" health maintenance. I have asked that he contact his primary care provider for follow-up on this health maintenance.

## 2018-12-03 NOTE — PROGRESS NOTES
Glucose levels have gone up and so has cholesterol. A1C is 5.9% was 5.1%    Cholesterol levels have gone up too.     Needs to work on diet and exercise

## 2018-12-06 ENCOUNTER — HOSPITAL ENCOUNTER (OUTPATIENT)
Dept: INFUSION THERAPY | Age: 48
Discharge: HOME | End: 2018-12-06
Payer: COMMERCIAL

## 2018-12-06 VITALS
HEART RATE: 66 BPM | RESPIRATION RATE: 18 BRPM | DIASTOLIC BLOOD PRESSURE: 81 MMHG | SYSTOLIC BLOOD PRESSURE: 119 MMHG | TEMPERATURE: 97.6 F

## 2018-12-06 DIAGNOSIS — D50.0 IRON DEFICIENCY ANEMIA DUE TO CHRONIC BLOOD LOSS: Primary | ICD-10-CM

## 2018-12-06 PROCEDURE — 74011250636 HC RX REV CODE- 250/636: Performed by: REGISTERED NURSE

## 2018-12-06 PROCEDURE — 96374 THER/PROPH/DIAG INJ IV PUSH: CPT

## 2018-12-06 RX ORDER — SODIUM CHLORIDE 9 MG/ML
500 INJECTION, SOLUTION INTRAVENOUS ONCE
Status: DISPENSED | OUTPATIENT
Start: 2018-12-06 | End: 2018-12-06

## 2018-12-06 RX ADMIN — FERRIC CARBOXYMALTOSE INJECTION 750 MG: 50 INJECTION, SOLUTION INTRAVENOUS at 11:59

## 2018-12-06 NOTE — PROGRESS NOTES
Outpatient Infusion Center Short Visit Progress Note    1100 Pt admit to [facility] ambulatory in stable condition. Assessment completed. No new concerns voiced. Patient Vitals for the past 12 hrs:   Temp Pulse Resp BP   12/06/18 1106 97.6 °F (36.4 °C) 66 18 119/81       PIV with positive blood return flushed and de-accessed per protocol. Medications:  Injectafer    1230 Pt tolerated treatment well. D/c home ambulatory in no distress.

## 2018-12-12 ENCOUNTER — DOCUMENTATION ONLY (OUTPATIENT)
Dept: ONCOLOGY | Age: 48
End: 2018-12-12

## 2018-12-13 ENCOUNTER — HOSPITAL ENCOUNTER (OUTPATIENT)
Dept: INFUSION THERAPY | Age: 48
Discharge: HOME | End: 2018-12-13
Payer: COMMERCIAL

## 2018-12-13 VITALS
TEMPERATURE: 98.5 F | HEART RATE: 79 BPM | RESPIRATION RATE: 18 BRPM | DIASTOLIC BLOOD PRESSURE: 73 MMHG | SYSTOLIC BLOOD PRESSURE: 111 MMHG

## 2018-12-13 DIAGNOSIS — D50.0 IRON DEFICIENCY ANEMIA DUE TO CHRONIC BLOOD LOSS: Primary | ICD-10-CM

## 2018-12-13 PROCEDURE — 96374 THER/PROPH/DIAG INJ IV PUSH: CPT

## 2018-12-13 PROCEDURE — 74011250636 HC RX REV CODE- 250/636: Performed by: REGISTERED NURSE

## 2018-12-13 RX ORDER — SODIUM CHLORIDE 9 MG/ML
500 INJECTION, SOLUTION INTRAVENOUS ONCE
Status: COMPLETED | OUTPATIENT
Start: 2018-12-13 | End: 2018-12-13

## 2018-12-13 RX ADMIN — SODIUM CHLORIDE 500 ML: 900 INJECTION, SOLUTION INTRAVENOUS at 15:56

## 2018-12-13 RX ADMIN — FERRIC CARBOXYMALTOSE INJECTION 750 MG: 50 INJECTION, SOLUTION INTRAVENOUS at 15:53

## 2018-12-13 NOTE — PROGRESS NOTES
Outpatient Infusion Center Short Visit Progress Note    9341 Pt admit to [facility] ambulatory in stable condition. Assessment completed. No new concerns voiced. Patient Vitals for the past 12 hrs:   Temp Pulse Resp BP   12/13/18 1548 98.5 °F (36.9 °C) 79 18 111/73       PIV with positive blood return flushed and de-accessed per protocol. Medications:  Injectafer    1063 Pt tolerated treatment well. D/c home ambulatory in no distress. Patient to follow up with MD for future appointments. Today was last dose.

## 2018-12-17 NOTE — MR AVS SNAPSHOT
[address] [place] [address] 
[phone] Patient: Maxi Eng MRN: [id number] :1970 Visit Information Date & Time Provider Department Dept. Phone Encounter #  
 2018 10:00 AM Timothy Knutson  [facility] [phone] [id number] Follow-up Instructions Return in about 6 months (around 2018) for hypertension follow up. Upcoming Health Maintenance Date Due  
 MEDICARE YEARLY EXAM 3/28/2018 HEMOGLOBIN A1C Q6M 2018 Influenza Age 5 to Adult 2018 LIPID PANEL Q1 2018 DTaP/Tdap/Td series (2 - Td) 2024 Allergies as of 2018  Review Complete On: 2018 By: Timothy Knutson MD  
  
 Severity Noted Reaction Type Reactions Invega [Paliperidone]  2011    Unknown (comments) Current Immunizations  Reviewed on 2017 Name Date Influenza Vaccine 10/16/2017, 2015, 2013 Influenza Vaccine (Quad) PF 2016 10:35 AM  
 Pneumococcal Polysaccharide (PPSV-23) 2015  2:30 PM  
 Tdap 2014  9:48 AM, 2013 Not reviewed this visit You Were Diagnosed With   
  
 Codes Comments Elevated glucose    -  Primary ICD-10-CM: R73.09 
ICD-9-CM: 790.29 Traumatic brain injury, without loss of consciousness, initial encounter [facility]     ICD-10-CM: R96.8N8V 
ICD-9-CM: 854.01 Vitamin D deficiency     ICD-10-CM: E55.9 ICD-9-CM: 268.9 Mood disorder in conditions classified elsewhere     ICD-10-CM: F06.30 ICD-9-CM: 293.83 Encounter for Medicare annual wellness exam     ICD-10-CM: Z00.00 ICD-9-CM: V70.0 Vitals BP Pulse Temp Resp Height(growth percentile) Weight(growth percentile) 138/74 (BP 1 Location: Left arm, BP Patient Position: Sitting) (!) 59 97.4 °F (36.3 °C) (Oral) 18 5' 8\" (1.727 m) 134 lb (60.8 kg) SpO2 BMI Smoking Status 96% 20.37 kg/m2 Never Smoker Vitals History BMI and BSA Data Body Mass Index Body Surface Area  
 20.37 kg/m 2 1.71 m 2 Preferred Pharmacy Pharmacy Name Phone [address], [address] [facility] [address] [place] [phone] Your Updated Medication List  
  
   
This list is accurate as of 5/9/18 11:09 AM.  Always use your most recent med list.  
  
  
  
  
 BISAC-EVAC 10 mg suppository Generic drug:  bisacodyl Insert 10 mg into rectum nightly. bromocriptine 2.5 mg tablet Commonly known as:  PARLODEL Take 2.5 mg by mouth two (2) times a day. cholecalciferol 50,000 unit capsule Commonly known as:  VITAMIN D3 Take  by mouth every seven (7) days. clonazePAM 0.5 mg tablet Commonly known as:  Rollene Backbone Take  by mouth two (2) times a day. diazePAM 5 mg tablet Commonly known as:  VALIUM Take 1 Tab by mouth every six (6) hours as needed. Max Daily Amount: 20 mg.  
  
 eszopiclone 3 mg tablet Commonly known as:  Charlene Reinier Take  by mouth nightly. lithium carbonate  mg CR tablet Commonly known as:  Ruthine Dearth Take 300 mg by mouth four (4) times daily. LORazepam 2 mg tablet Commonly known as:  ATIVAN Take  by mouth every eight (8) hours as needed for Anxiety. melatonin 3 mg tablet Take 1 tablet by mouth 2 hours prior to bedtime  
  
 multivitamin, tx-iron-ca-min 9 mg iron-400 mcg Tab tablet Commonly known as:  THERA-M w/ IRON Take 1 Tab by mouth daily. omeprazole 40 mg capsule Commonly known as:  PRILOSEC Take 40 mg by mouth daily. oxyCODONE-acetaminophen 5-325 mg per tablet Commonly known as:  PERCOCET Take 1 Tab by mouth every four (4) hours as needed. Max Daily Amount: 6 Tabs. PROBIOTIC 4X 10-15 mg Tbec Generic drug:  B.infantis-B.ani-B.long-B.bifi Take  by mouth daily. propranolol 80 mg tablet Commonly known as:  INDERAL Take 60 mg by mouth two (2) times a day. Hold if systolic  Pressure is below 100 RHINOCORT AQUA 32 mcg/actuation nasal spray Generic drug:  budesonide 2 Sprays by Both Nostrils route two (2) times a day. SANCTURA 20 mg tablet Generic drug:  trospium Take  by mouth two (2) times a day. SENEXON 8.6 mg tablet Generic drug:  senna Take 1 Tab by mouth two (2) times a day. SEROquel 100 mg tablet Generic drug:  QUEtiapine Take 100 mg by mouth daily. traZODone 300 mg tablet Commonly known as:  Donzella Eaton Take 300 mg by mouth nightly. TRINTELLIX 20 mg tablet Generic drug:  vortioxetine Take  by mouth daily. VITAMIN D2 50,000 unit capsule Generic drug:  ergocalciferol Take 50,000 Units by mouth every seven (7) days. On Mondays XANAX 0.5 mg tablet Generic drug:  ALPRAZolam  
Take  by mouth. Take 1 tablet by mouth 30 minutes prior to CT scan for sedation We Performed the Following AMB POC HEMOGLOBIN A1C [79036 CPT(R)] Follow-up Instructions Return in about 6 months (around 11/9/2018) for hypertension follow up. Patient Instructions Medicare Wellness Visit, Male The best way to live healthy is to have a healthy lifestyle by eating a well-balanced diet, exercising regularly, limiting alcohol and stopping smoking. Regular physical exams and screening tests are another way to keep healthy. Preventive exams provided by your health care provider can find health problems before they become diseases or illnesses. Preventive services including immunizations, screening tests, monitoring and exams can help you take care of your own health. All people over age 72 should have a pneumovax  and and a prevnar shot to prevent pneumonia. These are once in a lifetime unless you and your provider decide differently. All people over 65 should have a yearly flu shot and a tetanus vaccine every 10 years. Screening for diabetes mellitus with a blood sugar test should be done every year. Glaucoma is a disease of the eye due to increased ocular pressure that can lead to blindness and it should be done every year by an eye professional. 
 
Cardiovascular screening tests that check for elevated lipids (fatty part of blood) which can lead to heart disease and strokes should be done every 5 years. Colorectal screening that evaluates for blood or polyps in your colon should be done yearly as a stool test or every five years as a flexible sigmoidoscope or every 10 years as a colonoscopy up to age 76. Men up to age 76 may need a screening blood test for prostate cancer at certain intervals, depending on their personal and family history. This decision is between the patient and his provider. If you have been a smoker or had family history of abdominal aortic aneurysms, you and your provider may decide to schedule an ultrasound test of your aorta. Hepatitis C screening is also recommended for anyone born between [birth year] through [address]. A shingles vaccine is also recommended once in a lifetime after age 61. Your Medicare Wellness Exam is recommended annually. Here is a list of your current Health Maintenance items with a due date: 
Health Maintenance Due Topic Date Due  
 Annual Well Visit  03/28/2018  Hemoglobin A1C    05/08/2018 Introducing [facility]! Dear Michelle Valiente: Thank you for requesting a [employer] account. Our records indicate that you already have an active [employer] account. You can access your account anytime at [email or web address] Did you know that you can access your hospital and ER discharge instructions at any time in [employer]? You can also review all of your test results from your hospital stay or ER visit. Additional Information If you have questions, please visit the Frequently Asked Questions section of the [employer] website at [email or web address] Remember, [employer] is NOT to be used for urgent needs. For medical emergencies, dial 911. Now available from your iPhone and Android! Please provide this summary of care documentation to your next provider. Your primary care clinician is listed as Michael Winter. If you have any questions after today's visit, please call [phone]. 18-Dec-2018 00:10

## 2019-01-24 ENCOUNTER — TELEPHONE (OUTPATIENT)
Dept: SLEEP MEDICINE | Age: 49
End: 2019-01-24

## 2019-01-24 DIAGNOSIS — G47.33 OSA (OBSTRUCTIVE SLEEP APNEA): Primary | ICD-10-CM

## 2019-01-24 NOTE — TELEPHONE ENCOUNTER
STUDY ORDER CONFIRMATION  Ericka Osborn 1970      Type of Study Requested: Direct Referral for Study - Please arrange a sleep study consult only if sleep study is positive. Referring Physician: Alfredo Turner    Date of Study: 3/28/2019  Spoke with: Roula      Height: 5'6  Weight: 136.6  (over 499 lb can only be done at [facility])  BMI: 20.71      Snoring      no    Excessive Daytime Sleepiness   yes    Witnessed Apnea     no    Hypertension      no    Cardiovascular disease (CHF-Heart Failure)  no    COPD or Emphysema?    no  On Oxygen?  lpm Day/Night   no    Restless Leg Symptoms-   Do you experience an uncomfortable sensation in your legs   especially at night?    no   Kicking?     no   Twitching?     no    Do you experience insomnia? yes  Sleep talking/walking? yes  Do you ever wake with a rapid heart rate?  no  Unusual movements in sleep (violent, flailing limbs? )no  Night Terrors?      no  Sleep Paralysis or Sleep Hallucinations:  (while waking from sleep or dream, you cannot move) no  Do you/have you had seizures?   no  Have you had a Stroke, CVA or TIA?   no       When? Do you take any medications for the following? Pain       yes  Anxiety       yes  Sleep       yes               Have you been in hospital?    no   Has it been at least 72 hrs. since discharge? no   Discharge Date 4/1/2018  (If not at least 72 hrs, cannot see pt. for study)    Are you currently on an antibiotic?   no  If yes, for what reason? Do you need anything from us for your employer? no    Are you a CDL, DOT or other Certified ? no     Have you had a sleep study before?   yes  If yes, when and where? [facility] 2009  Are you currently using CPAP?   no  If yes, what is the setting? Do you have any special needs?   Wheelchair      yes  Help to and from the bathroom   yes  Other?no      no    (If yes to any special needs a care giver may need to come with the patient and stay the night.)    Will someone need to accompany you on the night of your study? (Primarily for parents of minors, patients with special needs, elderly, long distance travel). Other family,  may stay until study begins. \"We want to be sure to take the best care of you. Are there Cultural or Spiritual needs that we should be aware of or are there any concerns that I can address for you?        no    If yes, describe:      Attach Medication List    If yes to any \"*\" or special needs, discuss with the Lead Tech    Notes:     Study date:3/28/2019  Time:9:30 Location:[facility]      Compiled by:  Ana Luisa Bonner    Date: 1/24/2019

## 2019-01-24 NOTE — TELEPHONE ENCOUNTER
STUDY ORDER CONFIRMATION    Study Indication:   G47.50  Study: Diagnostic polysomnogram - CPT 33871: Split Study if patient meets the criteria.   Study Instructions / additional orders:

## 2019-03-06 ENCOUNTER — TELEPHONE (OUTPATIENT)
Dept: ONCOLOGY | Age: 49
End: 2019-03-06

## 2019-03-06 NOTE — TELEPHONE ENCOUNTER
Call to [facility], [address] [place] [address], spoke with her assistant Roula. Advised of note by provider regarding recent labs, \" labs good, no iron needed, no hematology follow up needed. \" verbalized understanding and thanked for call. To PSR for scanning.

## 2019-03-27 ENCOUNTER — TELEPHONE (OUTPATIENT)
Dept: SLEEP MEDICINE | Age: 49
End: 2019-03-27

## 2019-03-27 NOTE — TELEPHONE ENCOUNTER
Phoned Nandini Jackson with [facility] to request additional information for the patient that is a direct referral from Dr. Jimmy Gudino. He is scheduled to have a sleep study on 3/28/2019 and our  is requesting additional clinical to get the study approved. EVELIA left on [phone] ext [phone], and [phone].

## 2019-05-08 ENCOUNTER — OFFICE VISIT (OUTPATIENT)
Dept: FAMILY MEDICINE CLINIC | Age: 49
End: 2019-05-08

## 2019-05-08 VITALS
TEMPERATURE: 98.4 F | SYSTOLIC BLOOD PRESSURE: 122 MMHG | WEIGHT: 136 LBS | OXYGEN SATURATION: 90 % | HEART RATE: 74 BPM | BODY MASS INDEX: 20.61 KG/M2 | HEIGHT: 68 IN | RESPIRATION RATE: 18 BRPM | DIASTOLIC BLOOD PRESSURE: 88 MMHG

## 2019-05-08 DIAGNOSIS — E78.2 ELEVATED CHOLESTEROL WITH ELEVATED TRIGLYCERIDES: Primary | ICD-10-CM

## 2019-05-08 DIAGNOSIS — Z00.00 ENCOUNTER FOR MEDICARE ANNUAL WELLNESS EXAM: ICD-10-CM

## 2019-05-08 DIAGNOSIS — Z12.11 COLON CANCER SCREENING: ICD-10-CM

## 2019-05-08 DIAGNOSIS — S06.9X0A TRAUMATIC BRAIN INJURY, WITHOUT LOSS OF CONSCIOUSNESS, INITIAL ENCOUNTER (HCC): ICD-10-CM

## 2019-05-08 DIAGNOSIS — R73.03 PREDIABETES: ICD-10-CM

## 2019-05-08 DIAGNOSIS — Z23 ENCOUNTER FOR IMMUNIZATION: ICD-10-CM

## 2019-05-08 DIAGNOSIS — E55.9 VITAMIN D DEFICIENCY: ICD-10-CM

## 2019-05-08 DIAGNOSIS — S06.9XAS: ICD-10-CM

## 2019-05-08 NOTE — PATIENT INSTRUCTIONS
Body Mass Index: Care Instructions  Your Care Instructions    Body mass index (BMI) can help you see if your weight is raising your risk for health problems. It uses a formula to compare how much you weigh with how tall you are. · A BMI lower than 18.5 is considered underweight. · A BMI between 18.5 and 24.9 is considered healthy. · A BMI between 25 and 29.9 is considered overweight. A BMI of 30 or higher is considered obese. If your BMI is in the normal range, it means that you have a lower risk for weight-related health problems. If your BMI is in the overweight or obese range, you may be at increased risk for weight-related health problems, such as high blood pressure, heart disease, stroke, arthritis or joint pain, and diabetes. If your BMI is in the underweight range, you may be at increased risk for health problems such as fatigue, lower protection (immunity) against illness, muscle loss, bone loss, hair loss, and hormone problems. BMI is just one measure of your risk for weight-related health problems. You may be at higher risk for health problems if you are not active, you eat an unhealthy diet, or you drink too much alcohol or use tobacco products. Follow-up care is a key part of your treatment and safety. Be sure to make and go to all appointments, and call your doctor if you are having problems. It's also a good idea to know your test results and keep a list of the medicines you take. How can you care for yourself at home? · Practice healthy eating habits. This includes eating plenty of fruits, vegetables, whole grains, lean protein, and low-fat dairy. · If your doctor recommends it, get more exercise. Walking is a good choice. Bit by bit, increase the amount you walk every day. Try for at least 30 minutes on most days of the week. · Do not smoke. Smoking can increase your risk for health problems. If you need help quitting, talk to your doctor about stop-smoking programs and medicines. These can increase your chances of quitting for good. · Limit alcohol to 2 drinks a day for men and 1 drink a day for women. Too much alcohol can cause health problems. If you have a BMI higher than 25  · Your doctor may do other tests to check your risk for weight-related health problems. This may include measuring the distance around your waist. A waist measurement of more than 40 inches in men or 35 inches in women can increase the risk of weight-related health problems. · Talk with your doctor about steps you can take to stay healthy or improve your health. You may need to make lifestyle changes to lose weight and stay healthy, such as changing your diet and getting regular exercise. If you have a BMI lower than 18.5  · Your doctor may do other tests to check your risk for health problems. · Talk with your doctor about steps you can take to stay healthy or improve your health. You may need to make lifestyle changes to gain or maintain weight and stay healthy, such as getting more healthy foods in your diet and doing exercises to build muscle. Where can you learn more? Go to http://nicki-rhonda.info/. Enter S176 in the search box to learn more about \"Body Mass Index: Care Instructions. \"  Current as of: June 25, 2018  Content Version: 11.9  © 5145-3067 [employer]. Care instructions adapted under license by [employer] (which disclaims liability or warranty for this information). If you have questions about a medical condition or this instruction, always ask your healthcare professional. [address] any warranty or liability for your use of this information.

## 2019-05-08 NOTE — ASSESSMENT & PLAN NOTE
This condition is managed by Specialist.  Lab Results   Component Value Date/Time    WBC 7.7 11/01/2018 12:45 PM    HGB 13.7 11/01/2018 12:45 PM    HCT 43.1 11/01/2018 12:45 PM    PLATELET 737 63/96/1029 12:45 PM    Creatinine 0.75 11/01/2018 12:45 PM    BUN 21 11/01/2018 12:45 PM    Potassium 4.8 11/01/2018 12:45 PM

## 2019-05-08 NOTE — PROGRESS NOTES
This is the Subsequent Medicare Annual Wellness Exam, performed 12 months or more after the Initial AWV or the last Subsequent AWV    I have reviewed the patient's medical history in detail and updated the computerized patient record. 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 ([facility] 8/10/2010    Mood disorder in conditions classified elsewhere 8/10/2010    Other specified transient mental disorders due to conditions classified elsewhere(293.89) 8/10/2010    Psychiatric disorder     ANXIETY    PUD (peptic ulcer disease)     HX    Quadriplegia, unspecified ([facility] 8/10/2010    Skin lesion 2/10/2014    Small bowel obstruction ([facility] 4/6/2015      Past Surgical History:   Procedure Laterality Date    ABDOMEN SURGERY PROC UNLISTED  2014    \"Ulcer surgery\"/Implantable Baclofen pump    COLONOSCOPY N/A 4/28/2017    COLONOSCOPY / EGD  performed by Maribeth Goldman MD at [address] HX OTHER SURGICAL  4/12/15    ANTONIO, exp lap, sm bowel resection    HX OTHER SURGICAL  4/15/15    exp lap with wash out     HX OTHER SURGICAL      MOHS    HX OTHER SURGICAL      BACLOFEN PUMP INSERTED     Current Outpatient Medications   Medication Sig Dispense Refill    vortioxetine (TRINTELLIX) 20 mg tablet Take  by mouth daily.  multivitamin, tx-iron-ca-min (THERA-M W/ IRON) 9 mg iron-400 mcg tab tablet Take 1 Tab by mouth daily.  QUEtiapine (SEROQUEL) 100 mg tablet Take 100 mg by mouth daily.  cholecalciferol (VITAMIN D3) 50,000 unit capsule Take  by mouth every seven (7) days.  eszopiclone (LUNESTA) 3 mg tablet Take  by mouth nightly.  omeprazole (PRILOSEC) 40 mg capsule Take 40 mg by mouth daily.  B.infantis-B.ani-B.long-B.bifi (PROBIOTIC 4X) 10-15 mg TbEC Take  by mouth daily.  senna (SENEXON) 8.6 mg tablet Take 1 Tab by mouth two (2) times a day.  melatonin 3 mg tablet Take 1 tablet by mouth 2 hours prior to bedtime      trospium (SANCTURA) 20 mg tablet Take  by mouth two (2) times a day.  clonazePAM (KLONOPIN) 0.5 mg tablet Take  by mouth two (2) times a day.  traZODone (DESYREL) 300 mg tablet Take 300 mg by mouth nightly.  propranolol (INDERAL) 80 mg tablet Take 60 mg by mouth two (2) times a day. Hold if systolic  Pressure is below 100      ergocalciferol (VITAMIN D) 50,000 unit capsule Take 50,000 Units by mouth every seven (7) days. On Mondays      budesonide (RHINOCORT AQUA) 32 mcg/Actuation nasal spray 2 Sprays by Both Nostrils route two (2) times a day.  diazePAM (VALIUM) 5 mg tablet Take 1 Tab by mouth every six (6) hours as needed. Max Daily Amount: 20 mg. 30 Tab 1    LORazepam (ATIVAN) 2 mg tablet Take  by mouth every eight (8) hours as needed for Anxiety.  ALPRAZolam (XANAX) 0.5 mg tablet Take  by mouth. Take 1 tablet by mouth 30 minutes prior to CT scan for sedation      bisacodyl (BISAC-EVAC) 10 mg suppository Insert 10 mg into rectum nightly.  lithium carbonate SR (LITHOBID) 300 mg CR tablet Take 300 mg by mouth four (4) times daily.        Allergies   Allergen Reactions    Invega [Paliperidone] Unknown (comments)     Family History   Problem Relation Age of Onset    Asthma Mother     Cancer Mother         BREAST    Cancer Father         Waldenstroms macroglobulinemia     Arthritis-osteo Father     Elevated Lipids Father     Hypertension Father     Asthma Brother     Anesth Problems Neg Hx      Social History     Tobacco Use    Smoking status: Never Smoker    Smokeless tobacco: Never Used   Substance Use Topics    Alcohol use: No     Comment: prior alcoholic     Patient Active Problem List   Diagnosis Code    Mood disorder in conditions classified elsewhere F06.30    Dysphagia R13.10    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 9X5A    Iron deficiency anemia due to chronic blood loss D50.0       Depression Risk Factor Screening:     3 most recent PHQ Screens 5/8/2019   PHQ Not Done Medical Reason (indicate in comments)   Little interest or pleasure in doing things -   Feeling down, depressed, irritable, or hopeless -   Total Score PHQ 2 -     Alcohol Risk Factor Screening: You do not drink alcohol or very rarely. Functional Ability and Level of Safety:   Hearing Loss  Hearing is good. Activities of Daily Living  The home contains: handrails and grab bars  Patient needs help with:  phone, transportation, shopping, preparing meals, laundry, housework, managing medications, managing money, eating, dressing, bathing, hygiene, bathroom needs and walking    Fall Risk  Fall Risk Assessment, last 12 mths 5/8/2019   Able to walk?  No       Abuse Screen  Patient is not abused    Cognitive Screening   Evaluation of Cognitive Function:  Not asessed      Patient Care Team   Patient Care Team:  Sindy Monge MD as PCP - General    Assessment/Plan   Education and counseling provided:  Are appropriate based on today's review and evaluation        Health Maintenance Due   Topic Date Due    Shingrix Vaccine Age 50> (1 of 2) 02/08/2019    FOBT Q 1 YEAR AGE 50-75  02/08/2019    MEDICARE YEARLY EXAM  05/10/2019

## 2019-05-08 NOTE — PROGRESS NOTES
Chief Complaint   Patient presents with    Cholesterol Problem     follow up     1. Have you been to the ER, urgent care clinic since your last visit? Hospitalized since your last visit? No    2. Have you seen or consulted any other health care providers outside of the [address] [place] [address] since your last visit? Include any pap smears or colon screening.  No

## 2019-05-08 NOTE — ASSESSMENT & PLAN NOTE
This condition is managed by Specialist.  Lab Results   Component Value Date/Time    WBC 7.7 11/01/2018 12:45 PM    HGB 13.7 11/01/2018 12:45 PM    HCT 43.1 11/01/2018 12:45 PM    PLATELET 937 98/18/6922 12:45 PM    Creatinine 0.75 11/01/2018 12:45 PM    BUN 21 11/01/2018 12:45 PM    Potassium 4.8 11/01/2018 12:45 PM

## 2019-05-08 NOTE — PROGRESS NOTES
HPI  Matthew Domingo 47 y.o. male  presents to the office today to discuss cholesterol. Blood pressure 122/88, pulse 74, temperature 98.4 °F (36.9 °C), temperature source Oral, resp. rate 18, height 5' 8\" (1.727 m), weight 136 lb (61.7 kg), SpO2 90 %. Body mass index is 20.68 kg/m². Chief Complaint   Patient presents with    Cholesterol Problem     follow up      Elevated Cholesterol/Triglycerides: Lipid panel on 11/1/18 notable for total cholesterol 190, HDL 61, , and triglycerides 99. Pt continues with dietary management. Vitamin D deficiency: Pt's vitamin D levels were 38.6 on 11/1/18. Prediabetes: Last A1c was 5.9 on 11/1/18. Health Maintenance: We discussed benefits of acquiring Shingrix vaccine. Also discussed risks and benefits of colon cancer screenings and will proceed with FIIT test.     Current Outpatient Medications   Medication Sig Dispense Refill    varicella-zoster recombinant, PF, (SHINGRIX, PF,) 50 mcg/0.5 mL susr injection 0.5 mL by IntraMUSCular route once for 1 dose. 0.5 mL 0    vortioxetine (TRINTELLIX) 20 mg tablet Take  by mouth daily.  multivitamin, tx-iron-ca-min (THERA-M W/ IRON) 9 mg iron-400 mcg tab tablet Take 1 Tab by mouth daily.  QUEtiapine (SEROQUEL) 100 mg tablet Take 100 mg by mouth daily.  cholecalciferol (VITAMIN D3) 50,000 unit capsule Take  by mouth every seven (7) days.  eszopiclone (LUNESTA) 3 mg tablet Take  by mouth nightly.  omeprazole (PRILOSEC) 40 mg capsule Take 40 mg by mouth daily.  B.infantis-B.ani-B.long-B.bifi (PROBIOTIC 4X) 10-15 mg TbEC Take  by mouth daily.  senna (SENEXON) 8.6 mg tablet Take 1 Tab by mouth two (2) times a day.  melatonin 3 mg tablet Take 1 tablet by mouth 2 hours prior to bedtime      trospium (SANCTURA) 20 mg tablet Take  by mouth two (2) times a day.  clonazePAM (KLONOPIN) 0.5 mg tablet Take  by mouth two (2) times a day.       traZODone (DESYREL) 300 mg tablet Take 300 mg by mouth nightly.  propranolol (INDERAL) 80 mg tablet Take 60 mg by mouth two (2) times a day. Hold if systolic  Pressure is below 100      ergocalciferol (VITAMIN D) 50,000 unit capsule Take 50,000 Units by mouth every seven (7) days. On Mondays      budesonide (RHINOCORT AQUA) 32 mcg/Actuation nasal spray 2 Sprays by Both Nostrils route two (2) times a day.        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 ([facility] 8/10/2010    Mood disorder in conditions classified elsewhere 8/10/2010    Other specified transient mental disorders due to conditions classified elsewhere(293.89) 8/10/2010    Psychiatric disorder     ANXIETY    PUD (peptic ulcer disease)     HX    Quadriplegia, unspecified ([facility] 8/10/2010    Skin lesion 2/10/2014    Small bowel obstruction ([facility] 4/6/2015     Past Surgical History:   Procedure Laterality Date    ABDOMEN SURGERY PROC UNLISTED  2014    \"Ulcer surgery\"/Implantable Baclofen pump    COLONOSCOPY N/A 4/28/2017    COLONOSCOPY / EGD  performed by [facility]inner [facility] at [facility] ENDOSCOPY    HX OTHER SURGICAL  4/12/15    ANTONIO, exp lap, sm bowel resection    HX OTHER SURGICAL  4/15/15    exp lap with wash out     HX OTHER SURGICAL      MOHS    HX OTHER SURGICAL      BACLOFEN PUMP INSERTED     Family History   Problem Relation Age of Onset    Asthma Mother     Cancer Mother         BREAST    Cancer Father         Waldenstroms macroglobulinemia     Arthritis-osteo Father     Elevated Lipids Father     Hypertension Father     Asthma Brother     Anesth Problems Neg Hx      Social History     Tobacco Use    Smoking status: Never Smoker    Smokeless tobacco: Never Used   Substance Use Topics    Alcohol use: No     Comment: prior alcoholic        Review of Systems   Constitutional: Negative for chills and fever. HENT: Negative for hearing loss and tinnitus. Eyes: Negative for blurred vision and double vision. Respiratory: Negative for shortness of breath. Cardiovascular: Negative for chest pain and palpitations. Gastrointestinal: Negative for nausea and vomiting. Genitourinary: Negative for dysuria and frequency. Musculoskeletal: Negative for back pain and falls. Skin: Negative for itching and rash. Neurological: Negative for dizziness, loss of consciousness and headaches. Psychiatric/Behavioral: Negative for depression. The patient is not nervous/anxious. Physical Exam   Constitutional: He appears well-developed and well-nourished. HENT:   Head: Normocephalic and atraumatic. Right Ear: External ear normal.   Left Ear: External ear normal.   Nose: Nose normal.   Mouth/Throat: Oropharynx is clear and moist.   Eyes: Conjunctivae and EOM are normal.   Neck: Normal range of motion. Neck supple. Cardiovascular: Normal rate, regular rhythm, normal heart sounds and intact distal pulses. Pulmonary/Chest: Effort normal and breath sounds normal.   Abdominal: Soft. Bowel sounds are normal.   Genitourinary: Testes normal.   Musculoskeletal:   Presents in wheelchair. Neurological: He is alert. Skin: Skin is warm and dry. Nursing note and vitals reviewed. ASSESSMENT and PLAN  Diagnoses and all orders for this visit:    1. Colon cancer screening  Advised pt to follow up for FIIT test.  -     OCCULT BLOOD IMMUNOASSAY,DIAGNOSTIC    2. Elevated cholesterol with elevated triglycerides  Presumed stable, will assess levels today. -     METABOLIC PANEL, COMPREHENSIVE  -     LIPID PANEL    3. Prediabetes  Last A1c was 5.9 on 11/2018, will assess labs. -     HEMOGLOBIN A1C WITH EAG    4.  Vitamin D deficiency  Presumed stable, will assess levels today. -     VITAMIN D, 25 HYDROXY    5. 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  Lab Results   Component Value Date/Time    WBC 7.7 11/01/2018 12:45 PM    HGB 13.7 11/01/2018 12:45 PM    HCT 43.1 11/01/2018 12:45 PM    PLATELET 530 71/46/9947 12:45 PM    Creatinine 0.75 11/01/2018 12:45 PM    BUN 21 11/01/2018 12:45 PM    Potassium 4.8 11/01/2018 12:45 PM     6. Traumatic brain injury, without loss of consciousness, initial encounter [facility])  Assessment & Plan: This condition is managed by Specialist.  Lab Results   Component Value Date/Time    WBC 7.7 11/01/2018 12:45 PM    HGB 13.7 11/01/2018 12:45 PM    HCT 43.1 11/01/2018 12:45 PM    PLATELET 553 12/58/2764 12:45 PM    Creatinine 0.75 11/01/2018 12:45 PM    BUN 21 11/01/2018 12:45 PM    Potassium 4.8 11/01/2018 12:45 PM       7. Encounter for immunization  Provided pt with prescription and will follow up with local pharmacy for Shingrix vaccine. -     varicella-zoster recombinant, PF, (SHINGRIX, PF,) 50 mcg/0.5 mL susr injection; 0.5 mL by IntraMUSCular route once for 1 dose. Follow-up and Dispositions    · Return in about 6 months (around 11/8/2019). Medication risks/benefits/costs/interactions/alternatives discussed with patient. Advised patient to call back or return to office if symptoms worsen/change/persist.  If patient cannot reach us or should anything more severe/urgent arise he/she should proceed directly to the nearest emergency department. Discussed expected course/resolution/complications of diagnosis in detail with patient. Patient given a written after visit summary which includes her diagnoses, current medications and vitals. Patient expressed understanding with the diagnosis and plan.     Written by stephen Dodd, as dictated by Cesar Cárdenas M.D.    11:11 AM - 11:24 AM    Total time spent with the patient 13 minutes, greater than 50% of time spent counseling patient.

## 2019-05-09 LAB
25(OH)D3+25(OH)D2 SERPL-MCNC: 36.1 NG/ML (ref 30–100)
ALBUMIN SERPL-MCNC: 4.1 G/DL (ref 3.5–5.5)
ALBUMIN/GLOB SERPL: 2.1 {RATIO} (ref 1.2–2.2)
ALP SERPL-CCNC: 96 IU/L (ref 39–117)
ALT SERPL-CCNC: 20 IU/L (ref 0–44)
AST SERPL-CCNC: 18 IU/L (ref 0–40)
BILIRUB SERPL-MCNC: 0.2 MG/DL (ref 0–1.2)
BUN SERPL-MCNC: 19 MG/DL (ref 6–24)
BUN/CREAT SERPL: 28 (ref 9–20)
CALCIUM SERPL-MCNC: 9.1 MG/DL (ref 8.7–10.2)
CHLORIDE SERPL-SCNC: 106 MMOL/L (ref 96–106)
CHOLEST SERPL-MCNC: 163 MG/DL (ref 100–199)
CO2 SERPL-SCNC: 20 MMOL/L (ref 20–29)
CREAT SERPL-MCNC: 0.69 MG/DL (ref 0.76–1.27)
EST. AVERAGE GLUCOSE BLD GHB EST-MCNC: 105 MG/DL
GLOBULIN SER CALC-MCNC: 2 G/DL (ref 1.5–4.5)
GLUCOSE SERPL-MCNC: 90 MG/DL (ref 65–99)
HBA1C MFR BLD: 5.3 % (ref 4.8–5.6)
HDLC SERPL-MCNC: 54 MG/DL
INTERPRETATION, 910389: NORMAL
LDLC SERPL CALC-MCNC: 96 MG/DL (ref 0–99)
POTASSIUM SERPL-SCNC: 4.8 MMOL/L (ref 3.5–5.2)
PROT SERPL-MCNC: 6.1 G/DL (ref 6–8.5)
SODIUM SERPL-SCNC: 141 MMOL/L (ref 134–144)
TRIGL SERPL-MCNC: 64 MG/DL (ref 0–149)
VLDLC SERPL CALC-MCNC: 13 MG/DL (ref 5–40)

## 2019-06-18 ENCOUNTER — TELEPHONE (OUTPATIENT)
Dept: SLEEP MEDICINE | Age: 49
End: 2019-06-18

## 2019-06-20 ENCOUNTER — HOSPITAL ENCOUNTER (OUTPATIENT)
Dept: SLEEP MEDICINE | Age: 49
Discharge: HOME | End: 2019-06-20
Payer: MEDICARE

## 2019-06-20 DIAGNOSIS — G47.33 OSA (OBSTRUCTIVE SLEEP APNEA): ICD-10-CM

## 2019-06-20 PROCEDURE — 95810 POLYSOM 6/> YRS 4/> PARAM: CPT | Performed by: INTERNAL MEDICINE

## 2019-06-29 ENCOUNTER — TELEPHONE (OUTPATIENT)
Dept: SLEEP MEDICINE | Age: 49
End: 2019-06-29

## 2019-07-01 ENCOUNTER — DOCUMENTATION ONLY (OUTPATIENT)
Dept: SLEEP MEDICINE | Age: 49
End: 2019-07-01

## 2019-11-06 ENCOUNTER — OFFICE VISIT (OUTPATIENT)
Dept: FAMILY MEDICINE CLINIC | Age: 49
End: 2019-11-06

## 2019-11-06 VITALS
SYSTOLIC BLOOD PRESSURE: 94 MMHG | RESPIRATION RATE: 19 BRPM | DIASTOLIC BLOOD PRESSURE: 70 MMHG | HEIGHT: 68 IN | HEART RATE: 67 BPM | TEMPERATURE: 98.2 F | OXYGEN SATURATION: 95 % | BODY MASS INDEX: 20.68 KG/M2

## 2019-11-06 DIAGNOSIS — R73.03 PREDIABETES: ICD-10-CM

## 2019-11-06 DIAGNOSIS — S06.9X0A TRAUMATIC BRAIN INJURY, WITHOUT LOSS OF CONSCIOUSNESS, INITIAL ENCOUNTER (HCC): ICD-10-CM

## 2019-11-06 DIAGNOSIS — E78.2 ELEVATED CHOLESTEROL WITH ELEVATED TRIGLYCERIDES: Primary | ICD-10-CM

## 2019-11-06 DIAGNOSIS — H61.23 BILATERAL IMPACTED CERUMEN: ICD-10-CM

## 2019-11-06 DIAGNOSIS — E55.9 VITAMIN D DEFICIENCY: ICD-10-CM

## 2019-11-06 RX ORDER — FACIAL-BODY WIPES
10 EACH TOPICAL
COMMUNITY

## 2019-11-06 RX ORDER — MAGNESIUM CITRATE
296 SOLUTION, ORAL ORAL
COMMUNITY
End: 2021-04-21

## 2019-11-06 NOTE — PATIENT INSTRUCTIONS
Learning About High Cholesterol  What is high cholesterol? Cholesterol is a type of fat in your blood. It is needed for many body functions, such as making new cells. Cholesterol is made by your body. It also comes from food you eat. If you have too much cholesterol, it starts to build up in your arteries. This is called hardening of the arteries, or atherosclerosis. High cholesterol raises your risk of a heart attack and stroke. There are different types of cholesterol. LDL is the \"bad\" cholesterol. High LDL can raise your risk for heart disease, heart attack, and stroke. HDL is the \"good\" cholesterol. High HDL is linked with a lower risk for heart disease, heart attack, and stroke. Your cholesterol levels help your doctor find out your risk for having a heart attack or stroke. How can you prevent high cholesterol? A heart-healthy lifestyle can help you prevent high cholesterol. This lifestyle helps lower your risk for a heart attack and stroke. · Eat heart-healthy foods. ? Eat fruits, vegetables, whole grains (like oatmeal), dried beans and peas, nuts and seeds, soy products (like tofu), and fat-free or low-fat dairy products. ? Replace butter, margarine, and hydrogenated or partially hydrogenated oils with olive and canola oils. (Canola oil margarine without trans fat is fine.)  ? Replace red meat with fish, poultry, and soy protein (like tofu). ? Limit processed and packaged foods like chips, crackers, and cookies. · Be active. Exercise can improve your cholesterol level. Get at least 30 minutes of exercise on most days of the week. Walking is a good choice. You also may want to do other activities, such as running, swimming, cycling, or playing tennis or team sports. · Stay at a healthy weight. Lose weight if you need to. · Don't smoke. If you need help quitting, talk to your doctor about stop-smoking programs and medicines. These can increase your chances of quitting for good.   How is high cholesterol treated? The goal of treatment is to reduce your chances of having a heart attack or stroke. The goal is not to lower your cholesterol numbers only. · You may make lifestyle changes, such as eating healthy foods, not smoking, losing weight, and being more active. · You may have to take medicine. Follow-up care is a key part of your treatment and safety. Be sure to make and go to all appointments, and call your doctor if you are having problems. It's also a good idea to know your test results and keep a list of the medicines you take. Where can you learn more? Go to http://nicki-rhonda.info/. Enter E383 in the search box to learn more about \"Learning About High Cholesterol. \"  Current as of: April 9, 2019  Content Version: 12.2  © 6596-9072 [employer]. Care instructions adapted under license by [employer] (which disclaims liability or warranty for this information). If you have questions about a medical condition or this instruction, always ask your healthcare professional. [address] any warranty or liability for your use of this information.

## 2019-11-06 NOTE — PROGRESS NOTES
HPI  Camilo Isbell 47 y.o. male  presents to the office today with caregiver for 6 month follow up. Pt is enrolled in [facility] and is under care of Dr. Queen Parker. Blood pressure 94/70, pulse 67, temperature 98.2 °F (36.8 °C), temperature source Oral, resp. rate 19, height 5' 8\" (1.727 m), SpO2 95 %. Body mass index is 20.68 kg/m². Chief Complaint   Patient presents with    Cholesterol Problem     follow up       Elevated cholesterol with elevated triglycerides: Lipid panel on 5/8/19 notable for total cholesterol 163, HDL 54, LDL 96, and triglycerides 64. Prediabetes: A1c was 5.8. On 5/8/19. Vitamin D deficiency: Pt's vitamin D levels were 36.1 on 5/8/19. Pt continues with Cholecalciferol. Traumatic brain injury: Pt is under care of Dr. Queen Parker. Pt presents with impacted cerumen bilaterally. Current Outpatient Medications   Medication Sig Dispense Refill    bisacodyl (DULCOLAX) 10 mg suppository 10 mg.      magnesium citrate solution Take 296 mL by mouth now.  dextromethorphan-quiNIDine (NUEDEXTA) 20-10 mg per capsule Take 1 Cap by mouth.  multivitamin, tx-iron-ca-min (THERA-M W/ IRON) 9 mg iron-400 mcg tab tablet Take 1 Tab by mouth daily.  QUEtiapine (SEROQUEL) 100 mg tablet Take 100 mg by mouth daily.  cholecalciferol (VITAMIN D3) 50,000 unit capsule Take  by mouth every seven (7) days.  eszopiclone (LUNESTA) 3 mg tablet Take  by mouth nightly.  omeprazole (PRILOSEC) 40 mg capsule Take 40 mg by mouth daily.  B.infantis-B.ani-B.long-B.bifi (PROBIOTIC 4X) 10-15 mg TbEC Take  by mouth daily.  senna (SENEXON) 8.6 mg tablet Take 1 Tab by mouth two (2) times a day.  melatonin 3 mg tablet Take 1 tablet by mouth 2 hours prior to bedtime      clonazePAM (KLONOPIN) 0.5 mg tablet Take 3 mg by mouth three (3) times daily.  traZODone (DESYREL) 300 mg tablet Take 300 mg by mouth nightly.       propranolol (INDERAL) 80 mg tablet Take 60 mg by mouth two (2) times a day. Hold if systolic  Pressure is below 100      ergocalciferol (VITAMIN D) 50,000 unit capsule Take 50,000 Units by mouth every seven (7) days. On Mondays      budesonide (RHINOCORT AQUA) 32 mcg/Actuation nasal spray 2 Sprays by Both Nostrils route two (2) times a day.  vortioxetine (TRINTELLIX) 20 mg tablet Take  by mouth daily.  trospium (SANCTURA) 20 mg tablet Take  by mouth two (2) times a day.        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 ([facility] 8/10/2010    Mood disorder in conditions classified elsewhere 8/10/2010    Other specified transient mental disorders due to conditions classified elsewhere(293.89) 8/10/2010    Psychiatric disorder     ANXIETY    PUD (peptic ulcer disease)     HX    Quadriplegia, unspecified ([address] 8/10/2010    Skin lesion 2/10/2014    Small bowel obstruction ([address] 4/6/2015     Past Surgical History:   Procedure Laterality Date    ABDOMEN SURGERY PROC UNLISTED  2014    \"Ulcer surgery\"/Implantable Baclofen pump    COLONOSCOPY N/A 4/28/2017    COLONOSCOPY / EGD  performed by Roe Palma MD at [facility] ENDOSCOPY    HX OTHER SURGICAL  4/12/15    ANTONIO, exp lap, sm bowel resection    HX OTHER SURGICAL  4/15/15    exp lap with wash out     HX OTHER SURGICAL      MOHS    HX OTHER SURGICAL      BACLOFEN PUMP INSERTED     Family History   Problem Relation Age of Onset    Asthma Mother     Cancer Mother         BREAST    Cancer Father         Waldenstroms macroglobulinemia     Arthritis-osteo Father     Elevated Lipids Father     Hypertension Father     Asthma Brother     Anesth Problems Neg Hx      Social History     Tobacco Use    Smoking status: Never Smoker    Smokeless tobacco: Never Used   Substance Use Topics    Alcohol use: No     Comment: prior alcoholic        Review of Systems   Constitutional: Negative for chills and fever. HENT: Negative for hearing loss and tinnitus. Eyes: Negative for blurred vision and double vision. Respiratory: Negative for cough and shortness of breath. Cardiovascular: Negative for chest pain and palpitations. Gastrointestinal: Negative for nausea and vomiting. Genitourinary: Negative for dysuria and frequency. Musculoskeletal: Negative for back pain and falls. Skin: Negative for itching and rash. Neurological: Negative for dizziness, loss of consciousness and headaches. Physical Exam   Constitutional: He appears well-developed and well-nourished. HENT:   Right Ear: External ear normal.   Left Ear: External ear normal.   Nose: Nose normal.   Mouth/Throat: Oropharynx is clear and moist.   Eyes: Conjunctivae and EOM are normal.   Neck: Normal range of motion. Neck supple. Cardiovascular: Normal rate, regular rhythm, normal heart sounds and intact distal pulses. Pulmonary/Chest: Effort normal and breath sounds normal.   Abdominal: Soft. Bowel sounds are normal.   Musculoskeletal: Normal range of motion. Neurological: He is alert. Skin: Skin is warm and dry. Nursing note and vitals reviewed. ASSESSMENT and PLAN  Diagnoses and all orders for this visit:    1. Elevated cholesterol with elevated triglycerides  Lab reviewed. 2. Prediabetes  Last A1c 5.8.     3. Vitamin D deficiency  Lab reviewed. Pt continues with Cholecalciferol. 4. Traumatic brain injury, without loss of consciousness, initial encounter [facility]  Pt is under care of Dr. Nicky Gomez.      5. Bilateral impacted cerumen  Lavage performed today in office.   -     REMOVE IMPACTED EAR WAX    Follow-up and Dispositions    · Return in about 6 months (around 5/6/2020) for hyperlipidemia follow up. Medication risks/benefits/costs/interactions/alternatives discussed with patient. Advised patient to call back or return to office if symptoms worsen/change/persist.  If patient cannot reach us or should anything more severe/urgent arise he/she should proceed directly to the nearest emergency department. Discussed expected course/resolution/complications of diagnosis in detail with patient. Patient given a written after visit summary which includes her diagnoses, current medications and vitals. Patient expressed understanding with the diagnosis and plan. Written by stephen Jara, as dictated by Khang Vincent M.D.    10:53 AM - 10:58 AM    Total time spent with the patient 5 minutes, greater than 50% of time spent counseling patient.

## 2019-11-07 ENCOUNTER — HOSPITAL ENCOUNTER (OUTPATIENT)
Dept: GENERAL RADIOLOGY | Age: 49
Discharge: HOME | End: 2019-11-07

## 2019-11-07 ENCOUNTER — TELEPHONE (OUTPATIENT)
Dept: FAMILY MEDICINE CLINIC | Age: 49
End: 2019-11-07

## 2019-11-07 DIAGNOSIS — Z12.11 COLON CANCER SCREENING: Primary | ICD-10-CM

## 2019-11-07 DIAGNOSIS — M41.40 NEUROMUSCULAR SCOLIOSIS: ICD-10-CM

## 2019-11-07 NOTE — TELEPHONE ENCOUNTER
[facility] email me patient needs order for patient FIT test    Per Dr Sonia Castillo ok to order     Notified Sheila via email ok to  order for FIT test

## 2021-04-21 ENCOUNTER — HOSPITAL ENCOUNTER (EMERGENCY)
Age: 51
Discharge: OTHER INSTITUTION | End: 2021-04-21
Attending: EMERGENCY MEDICINE
Payer: MEDICARE

## 2021-04-21 ENCOUNTER — APPOINTMENT (OUTPATIENT)
Dept: GENERAL RADIOLOGY | Age: 51
End: 2021-04-21
Attending: EMERGENCY MEDICINE
Payer: MEDICARE

## 2021-04-21 VITALS
BODY MASS INDEX: 17.14 KG/M2 | HEART RATE: 72 BPM | OXYGEN SATURATION: 95 % | DIASTOLIC BLOOD PRESSURE: 80 MMHG | WEIGHT: 113.1 LBS | HEIGHT: 68 IN | TEMPERATURE: 98.2 F | SYSTOLIC BLOOD PRESSURE: 110 MMHG | RESPIRATION RATE: 18 BRPM

## 2021-04-21 DIAGNOSIS — E83.41 HYPERMAGNESEMIA: Primary | ICD-10-CM

## 2021-04-21 DIAGNOSIS — I95.9 TRANSIENT HYPOTENSION: ICD-10-CM

## 2021-04-21 DIAGNOSIS — E86.0 DEHYDRATION: ICD-10-CM

## 2021-04-21 LAB
ALBUMIN SERPL-MCNC: 3.8 G/DL (ref 3.5–5)
ALBUMIN/GLOB SERPL: 1.1 {RATIO} (ref 1.1–2.2)
ALP SERPL-CCNC: 110 U/L (ref 45–117)
ALT SERPL-CCNC: 26 U/L (ref 12–78)
ANION GAP SERPL CALC-SCNC: 13 MMOL/L (ref 5–15)
APPEARANCE UR: CLEAR
AST SERPL-CCNC: 26 U/L (ref 15–37)
BACTERIA URNS QL MICRO: ABNORMAL /HPF
BASOPHILS # BLD: 0.1 K/UL (ref 0–0.1)
BASOPHILS NFR BLD: 0 % (ref 0–1)
BILIRUB SERPL-MCNC: 0.3 MG/DL (ref 0.2–1)
BILIRUB UR QL: NEGATIVE
BUN SERPL-MCNC: 30 MG/DL (ref 6–20)
BUN/CREAT SERPL: 29 (ref 12–20)
CALCIUM SERPL-MCNC: 9.8 MG/DL (ref 8.5–10.1)
CHLORIDE SERPL-SCNC: 103 MMOL/L (ref 97–108)
CO2 SERPL-SCNC: 22 MMOL/L (ref 21–32)
COLOR UR: ABNORMAL
CREAT SERPL-MCNC: 1.05 MG/DL (ref 0.7–1.3)
DIFFERENTIAL METHOD BLD: ABNORMAL
EOSINOPHIL # BLD: 0.4 K/UL (ref 0–0.4)
EOSINOPHIL NFR BLD: 4 % (ref 0–7)
EPITH CASTS URNS QL MICRO: ABNORMAL /LPF
ERYTHROCYTE [DISTWIDTH] IN BLOOD BY AUTOMATED COUNT: 13.8 % (ref 11.5–14.5)
GLOBULIN SER CALC-MCNC: 3.4 G/DL (ref 2–4)
GLUCOSE SERPL-MCNC: 94 MG/DL (ref 65–100)
GLUCOSE UR STRIP.AUTO-MCNC: NEGATIVE MG/DL
HCT VFR BLD AUTO: 47.3 % (ref 36.6–50.3)
HGB BLD-MCNC: 15.3 G/DL (ref 12.1–17)
HGB UR QL STRIP: ABNORMAL
HYALINE CASTS URNS QL MICRO: >20 /LPF (ref 0–5)
IMM GRANULOCYTES # BLD AUTO: 0.1 K/UL (ref 0–0.04)
IMM GRANULOCYTES NFR BLD AUTO: 1 % (ref 0–0.5)
KETONES UR QL STRIP.AUTO: NEGATIVE MG/DL
LACTATE SERPL-SCNC: 1 MMOL/L (ref 0.4–2)
LEUKOCYTE ESTERASE UR QL STRIP.AUTO: NEGATIVE
LYMPHOCYTES # BLD: 1.7 K/UL (ref 0.8–3.5)
LYMPHOCYTES NFR BLD: 15 % (ref 12–49)
MAGNESIUM SERPL-MCNC: 6.1 MG/DL (ref 1.6–2.4)
MCH RBC QN AUTO: 29.2 PG (ref 26–34)
MCHC RBC AUTO-ENTMCNC: 32.3 G/DL (ref 30–36.5)
MCV RBC AUTO: 90.3 FL (ref 80–99)
MONOCYTES # BLD: 0.9 K/UL (ref 0–1)
MONOCYTES NFR BLD: 8 % (ref 5–13)
NEUTS SEG # BLD: 8.2 K/UL (ref 1.8–8)
NEUTS SEG NFR BLD: 72 % (ref 32–75)
NITRITE UR QL STRIP.AUTO: NEGATIVE
NRBC # BLD: 0 K/UL (ref 0–0.01)
NRBC BLD-RTO: 0 PER 100 WBC
PH UR STRIP: 5.5 [PH] (ref 5–8)
PLATELET # BLD AUTO: 304 K/UL (ref 150–400)
PMV BLD AUTO: 10.7 FL (ref 8.9–12.9)
POTASSIUM SERPL-SCNC: 4.5 MMOL/L (ref 3.5–5.1)
PROT SERPL-MCNC: 7.2 G/DL (ref 6.4–8.2)
PROT UR STRIP-MCNC: ABNORMAL MG/DL
RBC # BLD AUTO: 5.24 M/UL (ref 4.1–5.7)
RBC #/AREA URNS HPF: ABNORMAL /HPF (ref 0–5)
SODIUM SERPL-SCNC: 138 MMOL/L (ref 136–145)
SP GR UR REFRACTOMETRY: >1.03 (ref 1–1.03)
TROPONIN I SERPL-MCNC: <0.05 NG/ML
UA: UC IF INDICATED,UAUC: ABNORMAL
UROBILINOGEN UR QL STRIP.AUTO: 0.2 EU/DL (ref 0.2–1)
WBC # BLD AUTO: 11.4 K/UL (ref 4.1–11.1)
WBC URNS QL MICRO: ABNORMAL /HPF (ref 0–4)

## 2021-04-21 PROCEDURE — 83605 ASSAY OF LACTIC ACID: CPT

## 2021-04-21 PROCEDURE — 74011250636 HC RX REV CODE- 250/636: Performed by: EMERGENCY MEDICINE

## 2021-04-21 PROCEDURE — 87040 BLOOD CULTURE FOR BACTERIA: CPT

## 2021-04-21 PROCEDURE — 36415 COLL VENOUS BLD VENIPUNCTURE: CPT

## 2021-04-21 PROCEDURE — 80053 COMPREHEN METABOLIC PANEL: CPT

## 2021-04-21 PROCEDURE — 81001 URINALYSIS AUTO W/SCOPE: CPT

## 2021-04-21 PROCEDURE — 85025 COMPLETE CBC W/AUTO DIFF WBC: CPT

## 2021-04-21 PROCEDURE — 71045 X-RAY EXAM CHEST 1 VIEW: CPT

## 2021-04-21 PROCEDURE — 96360 HYDRATION IV INFUSION INIT: CPT

## 2021-04-21 PROCEDURE — 93005 ELECTROCARDIOGRAM TRACING: CPT

## 2021-04-21 PROCEDURE — 84484 ASSAY OF TROPONIN QUANT: CPT

## 2021-04-21 PROCEDURE — 99285 EMERGENCY DEPT VISIT HI MDM: CPT

## 2021-04-21 PROCEDURE — 96361 HYDRATE IV INFUSION ADD-ON: CPT

## 2021-04-21 PROCEDURE — 83735 ASSAY OF MAGNESIUM: CPT

## 2021-04-21 RX ORDER — SODIUM CHLORIDE 0.9 % (FLUSH) 0.9 %
5-10 SYRINGE (ML) INJECTION AS NEEDED
Status: DISCONTINUED | OUTPATIENT
Start: 2021-04-21 | End: 2021-04-22 | Stop reason: HOSPADM

## 2021-04-21 RX ADMIN — SODIUM CHLORIDE 1000 ML: 9 INJECTION, SOLUTION INTRAVENOUS at 19:02

## 2021-04-21 RX ADMIN — SODIUM CHLORIDE 1000 ML: 9 INJECTION, SOLUTION INTRAVENOUS at 17:41

## 2021-04-21 NOTE — ED PROVIDER NOTES
14-year-old male with a history of traumatic brain injury presents with a chief complaint of dizziness/lightheadedness. The patient states that his symptoms started approximately 1 hour ago. He denies having any pain.   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 8/10/2010    Mood disorder in conditions classified elsewhere 8/10/2010    Other specified transient mental disorders due to conditions classified elsewhere(293.89) 8/10/2010    Psychiatric disorder     ANXIETY    PUD (peptic ulcer disease)     HX    Quadriplegia, unspecified ([facility] 8/10/2010    Skin lesion 2/10/2014    Small bowel obstruction ([facility] 4/6/2015       Past Surgical History:   Procedure Laterality Date    COLONOSCOPY N/A 4/28/2017    COLONOSCOPY / EGD  performed by Erin Han MD at [address] [place] [address] ENDOSCOPY    HX OTHER SURGICAL  4/12/15    ANTONIO, exp lap, sm bowel resection    HX OTHER SURGICAL  4/15/15    exp lap with wash out     HX OTHER SURGICAL      MOHS    HX OTHER SURGICAL      BACLOFEN PUMP INSERTED    CA ABDOMEN SURGERY [address] UNLISTED  2014    \"Ulcer surgery\"/Implantable Baclofen pump         Family History:   Problem Relation Age of Onset    Asthma Mother     Cancer Mother         BREAST    Cancer Father         Waldenstroms macroglobulinemia     Arthritis-osteo Father     Elevated Lipids Father     Hypertension Father     Asthma Brother     Anesth Problems Neg Hx        Social History     Socioeconomic History    Marital status: SINGLE     Spouse name: Not on file    Number of children: Not on file    Years of education: Not on file    Highest education level: Not on file   Occupational History    Not on file   Social Needs    Financial resource strain: Not on file    Food insecurity     Worry: Not on file     Inability: Not on file    Transportation needs     Medical: Not on file     Non-medical: Not on file   Tobacco Use    Smoking status: Never Smoker    Smokeless tobacco: Never Used   Substance and Sexual Activity    Alcohol use: No     Comment: prior alcoholic    Drug use: No    Sexual activity: Not on file   Lifestyle    Physical activity     Days per week: Not on file     Minutes per session: Not on file    Stress: Not on file   Relationships    Social connections     Talks on phone: Not on file     Gets together: Not on file     Attends [employer] service: Not on file     Active member of club or organization: Not on file     Attends meetings of clubs or organizations: Not on file     Relationship status: Not on file    Intimate partner violence     Fear of current or ex partner: Not on file     Emotionally abused: Not on file     Physically abused: Not on file     Forced sexual activity: Not on file   Other Topics Concern    Not on file   Social History Narrative    Not on file         ALLERGIES: Invega [paliperidone]    Review of Systems   Unable to perform ROS: Other   TBI    Vitals:    04/21/21 1733   BP: 99/74   Pulse: 60   Resp: 16   Temp: 97.6 °F (36.4 °C)   SpO2: 93%   Weight: 51.3 kg (113 lb 1.5 oz)   Height: 5' 8\" (1.727 m)            Physical Exam  Vitals signs and nursing note reviewed. Constitutional:       General: He is not in acute distress. Appearance: Normal appearance. He is not ill-appearing, toxic-appearing or diaphoretic. HENT:      Head: Normocephalic. Eyes:      Extraocular Movements: Extraocular movements intact. Neck:      Musculoskeletal: Normal range of motion. Cardiovascular:      Rate and Rhythm: Normal rate. Pulses: Normal pulses. Heart sounds: No murmur.  No friction rub. No gallop. Pulmonary:      Effort: Pulmonary effort is normal. No respiratory distress. Breath sounds: Normal breath sounds. No wheezing or rales. Abdominal:      General: Abdomen is flat. Bowel sounds are normal. There is no distension. Palpations: Abdomen is soft. Tenderness: There is no abdominal tenderness. There is no guarding. Comments: Baclofen pump underlying the skin of the left abdomen. No evidence of infection. Musculoskeletal: Normal range of motion. Skin:     General: Skin is warm and dry. Capillary Refill: Capillary refill takes less than 2 seconds. Neurological:      Mental Status: He is alert and oriented to person, place, and time. Psychiatric:         Mood and Affect: Mood normal.          MDM  Number of Diagnoses or Management Options  Dehydration  Hypermagnesemia  Transient hypotension  Diagnosis management comments: 42-year-old male presents with lightheadedness/dizziness. He does reportedly ambulate with some assistance at baseline. His blood pressure is borderline low here in the ED and he was given IV fluids. His laboratory studies are consistent with dehydration with an elevated BUN/creatinine ratio. There is no evidence of infection and he has a normal rectal temp. He will be given additional IV fluids as he was still borderline despite an initial liter. He will be signed out to the night attending pending ability to ambulate at his baseline and discharge versus admission. EKG shows a normal sinus rhythm at a rate of 61, normal intervals, normal axis, no ischemic changes. 7:00 PM  Change of shift. Care of patient signed over to Dr. Senait Sanchez. Bedside handoff complete. Awaiting fluids, ambulation and disposition. Update 8:40 PM  This is Dr. Senait Sanchez. Patient has been doing well since I took over. His blood pressure has returned to 113/89. Heart rate of 86. Patient has no complaints.   Review of the records reveals high specific gravity, BUN, and creatinine when compared to prior lab results. This would go to dehydration. His magnesium was also markedly elevated. Nursing spoke with the patient's family and they were upset the patient's physician, Dr. Sanjeev whiting, had not been further involved. I spoke with him when he called and we reviewed the case. We both agreed the high magnesium level was probably contributing to his brief episode of unresponsiveness and low blood pressure. This was further exacerbated by some dehydration. His physician thought he had been receiving milk of magnesia for frequent constipation. After IV fluids given here he felt comfortable taking the patient back and continuing to keep the patient hydrated and discontinuing any magnesium supplements. Neither of us felt the patient needed admission at this point, but I stressed that we would be happy to reevaluate him should his physician deem this necessary.            Procedures

## 2021-04-21 NOTE — ED TRIAGE NOTES
[facility] having [facility]opal event. Pt is currently at his baseline, pt has a baclofen pump which stopped working a month ago.

## 2021-04-22 LAB
ATRIAL RATE: 61 BPM
CALCULATED P AXIS, ECG09: 33 DEGREES
CALCULATED R AXIS, ECG10: 52 DEGREES
CALCULATED T AXIS, ECG11: 31 DEGREES
DIAGNOSIS, 93000: NORMAL
P-R INTERVAL, ECG05: 150 MS
Q-T INTERVAL, ECG07: 432 MS
QRS DURATION, ECG06: 88 MS
QTC CALCULATION (BEZET), ECG08: 434 MS
VENTRICULAR RATE, ECG03: 61 BPM

## 2021-04-22 NOTE — ED NOTES
Attempted multiple times to reach [facility] by phone for clarification on ambulation status. RN spoke with father who states patient is non-ambulatory.

## 2021-04-22 NOTE — ED NOTES
Pt updated on plan for discharge, repositioned to position of comfort. Call bell in reach.  Awaiting transport

## 2021-04-22 NOTE — ED NOTES
[facility] supervisor to ER to transport pt home. CHANDRIKA cx. Instructions reviewed with supervisor and sent home with pt.  Pt discharged in NAD, vss.

## 2021-04-26 LAB
BACTERIA SPEC CULT: NORMAL
BACTERIA SPEC CULT: NORMAL
SERVICE CMNT-IMP: NORMAL
SERVICE CMNT-IMP: NORMAL

## 2021-05-10 RX ORDER — SODIUM CHLORIDE 9 MG/ML
25 INJECTION, SOLUTION INTRAVENOUS CONTINUOUS
Status: DISPENSED | OUTPATIENT
Start: 2021-05-13 | End: 2021-05-13

## 2021-05-13 ENCOUNTER — HOSPITAL ENCOUNTER (OUTPATIENT)
Dept: INFUSION THERAPY | Age: 51
Discharge: HOME | End: 2021-05-13
Payer: MEDICARE

## 2021-05-13 VITALS
RESPIRATION RATE: 18 BRPM | TEMPERATURE: 97 F | DIASTOLIC BLOOD PRESSURE: 67 MMHG | SYSTOLIC BLOOD PRESSURE: 92 MMHG | HEART RATE: 85 BPM

## 2021-05-13 PROCEDURE — 74011250636 HC RX REV CODE- 250/636: Performed by: PHYSICAL MEDICINE & REHABILITATION

## 2021-05-13 PROCEDURE — 96374 THER/PROPH/DIAG INJ IV PUSH: CPT

## 2021-05-13 RX ADMIN — SODIUM CHLORIDE 25 ML/HR: 900 INJECTION, SOLUTION INTRAVENOUS at 14:34

## 2021-05-13 RX ADMIN — FERRIC CARBOXYMALTOSE INJECTION 750 MG: 50 INJECTION, SOLUTION INTRAVENOUS at 14:30

## 2021-05-13 NOTE — PROGRESS NOTES
[facility] Short Visit Note:    Pt arrived via wheel chair and caregiver. Patient denied any pain wand was not in any distress, received injectafer and tolerated well. A PIV was established in his R arm without difficulty, and positive blood return. Patient connected to saline KVO. Coffee and snack provided for patient. Patient was discharge without incident with caregiver and is aware of follow up appointment for next week. Patient Vitals for the past 12 hrs:   Temp Pulse Resp BP   05/13/21 1358 97 °F (36.1 °C) 85 18 92/67     Medications Administered     0.9% sodium chloride infusion     Admin Date  05/13/2021 Action  New Bag Dose  25 mL/hr Rate  25 mL/hr Route  IntraVENous Administered By  Akin Aparicio RN          ferric carboxymaltose (INJECTAFER) 750 mg in 0.9% sodium chloride 250 mL, overfill volume 25 mL IVPB     Admin Date  05/13/2021 Action  Given Dose  750 mg Rate  870 mL/hr Route  IntraVENous Administered By  Akin Aparicio RN                  D/Cd from [facility] wheel chair and in no distress.   Next visit   Future Appointments   Date Time Provider Ana María Blake   5/20/2021  1:00  [facility]

## 2021-05-17 RX ORDER — SODIUM CHLORIDE 9 MG/ML
25 INJECTION, SOLUTION INTRAVENOUS CONTINUOUS
Status: DISCONTINUED | OUTPATIENT
Start: 2021-05-20 | End: 2021-05-21 | Stop reason: HOSPADM

## 2021-05-20 ENCOUNTER — HOSPITAL ENCOUNTER (OUTPATIENT)
Dept: INFUSION THERAPY | Age: 51
Discharge: HOME | End: 2021-05-20
Payer: MEDICARE

## 2021-05-20 VITALS
TEMPERATURE: 98 F | RESPIRATION RATE: 18 BRPM | SYSTOLIC BLOOD PRESSURE: 122 MMHG | HEART RATE: 100 BPM | DIASTOLIC BLOOD PRESSURE: 87 MMHG

## 2021-05-20 PROCEDURE — 96365 THER/PROPH/DIAG IV INF INIT: CPT

## 2021-05-20 PROCEDURE — 74011250636 HC RX REV CODE- 250/636: Performed by: PHYSICAL MEDICINE & REHABILITATION

## 2021-05-20 RX ADMIN — FERRIC CARBOXYMALTOSE INJECTION 750 MG: 50 INJECTION, SOLUTION INTRAVENOUS at 13:22

## 2021-05-20 RX ADMIN — SODIUM CHLORIDE 25 ML/HR: 900 INJECTION, SOLUTION INTRAVENOUS at 13:21

## 2021-05-20 NOTE — PROGRESS NOTES
Outpatient Infusion Center Progress Note    1250 Pt admit to [facility] for dose 2 of 2 Ferric Carboxymaltose, With Nursing attendant in a wheelchair in stable condition. Assessment completed. No new concerns voiced. Piv established in left AC with good blood return. Patient [facility] at the [facility] nursing facility, to the best of his knowledge, he has not had any Covid contact, denies fever, sob, cough and feeling unwell    Visit Vitals  /87   Pulse 100   Temp 98 °F (36.7 °C)   Resp 18       Medications:  Medications Administered     0.9% sodium chloride infusion     Admin Date  05/20/2021 Action  New Bag Dose  25 mL/hr Rate  25 mL/hr Route  IntraVENous Administered By  Castro Jeong, ALTA          ferric carboxymaltose (INJECTAFER) 750 mg in 0.9% sodium chloride 250 mL, overfill volume 25 mL IVPB     Admin Date  05/20/2021 Action  New Bag Dose  750 mg Rate  870 mL/hr Route  IntraVENous Administered By  Castro Jeong, RN                2207 Pt tolerated treatment well. Piv maintained good blood return throughout infusion, removed per protocol at end of infusion D/c home Via wheelchair in no distress. Pt aware of next appointment scheduled for not needed at this time.

## 2021-07-23 DIAGNOSIS — E55.9 VITAMIN D DEFICIENCY: Primary | ICD-10-CM

## 2021-07-23 DIAGNOSIS — S06.9X0A TRAUMATIC BRAIN INJURY, WITHOUT LOSS OF CONSCIOUSNESS, INITIAL ENCOUNTER (HCC): ICD-10-CM

## 2021-07-23 DIAGNOSIS — E78.2 ELEVATED CHOLESTEROL WITH ELEVATED TRIGLYCERIDES: ICD-10-CM

## 2021-07-23 DIAGNOSIS — R73.03 PREDIABETES: ICD-10-CM

## 2021-07-23 DIAGNOSIS — F06.30 MOOD DISORDER IN CONDITIONS CLASSIFIED ELSEWHERE: ICD-10-CM

## 2021-07-23 DIAGNOSIS — D50.0 IRON DEFICIENCY ANEMIA DUE TO CHRONIC BLOOD LOSS: ICD-10-CM

## 2021-08-15 ENCOUNTER — HOSPITAL ENCOUNTER (EMERGENCY)
Age: 51
Discharge: HOME | End: 2021-08-15
Attending: STUDENT IN AN ORGANIZED HEALTH CARE EDUCATION/TRAINING PROGRAM
Payer: MEDICARE

## 2021-08-15 ENCOUNTER — TELEPHONE (OUTPATIENT)
Dept: FAMILY MEDICINE CLINIC | Age: 51
End: 2021-08-15

## 2021-08-15 VITALS
OXYGEN SATURATION: 94 % | BODY MASS INDEX: 21.39 KG/M2 | DIASTOLIC BLOOD PRESSURE: 68 MMHG | SYSTOLIC BLOOD PRESSURE: 111 MMHG | RESPIRATION RATE: 19 BRPM | WEIGHT: 140.65 LBS | TEMPERATURE: 98.7 F | HEART RATE: 74 BPM

## 2021-08-15 DIAGNOSIS — R50.9 FEVER, UNSPECIFIED FEVER CAUSE: ICD-10-CM

## 2021-08-15 DIAGNOSIS — U07.1 COVID-19: Primary | ICD-10-CM

## 2021-08-15 LAB
ALBUMIN SERPL-MCNC: 3.1 G/DL (ref 3.5–5)
ALBUMIN/GLOB SERPL: 1.1 {RATIO} (ref 1.1–2.2)
ALP SERPL-CCNC: 115 U/L (ref 45–117)
ALT SERPL-CCNC: 33 U/L (ref 12–78)
ANION GAP SERPL CALC-SCNC: 12 MMOL/L (ref 5–15)
APPEARANCE UR: ABNORMAL
AST SERPL-CCNC: 20 U/L (ref 15–37)
BACTERIA URNS QL MICRO: NEGATIVE /HPF
BASOPHILS # BLD: 0 K/UL (ref 0–0.1)
BASOPHILS NFR BLD: 0 % (ref 0–1)
BILIRUB SERPL-MCNC: 0.2 MG/DL (ref 0.2–1)
BILIRUB UR QL: NEGATIVE
BUN SERPL-MCNC: 21 MG/DL (ref 6–20)
BUN/CREAT SERPL: 29 (ref 12–20)
CALCIUM SERPL-MCNC: 7.7 MG/DL (ref 8.5–10.1)
CHLORIDE SERPL-SCNC: 106 MMOL/L (ref 97–108)
CO2 SERPL-SCNC: 23 MMOL/L (ref 21–32)
COLOR UR: ABNORMAL
COVID-19 RAPID TEST, COVR: DETECTED
CREAT SERPL-MCNC: 0.73 MG/DL (ref 0.7–1.3)
DIFFERENTIAL METHOD BLD: ABNORMAL
EOSINOPHIL # BLD: 0 K/UL (ref 0–0.4)
EOSINOPHIL NFR BLD: 0 % (ref 0–7)
EPITH CASTS URNS QL MICRO: ABNORMAL /LPF
ERYTHROCYTE [DISTWIDTH] IN BLOOD BY AUTOMATED COUNT: 13.4 % (ref 11.5–14.5)
GLOBULIN SER CALC-MCNC: 2.8 G/DL (ref 2–4)
GLUCOSE SERPL-MCNC: 134 MG/DL (ref 65–100)
GLUCOSE UR STRIP.AUTO-MCNC: >1000 MG/DL
HCT VFR BLD AUTO: 43.4 % (ref 36.6–50.3)
HGB BLD-MCNC: 14 G/DL (ref 12.1–17)
HGB UR QL STRIP: ABNORMAL
IMM GRANULOCYTES # BLD AUTO: 0 K/UL (ref 0–0.04)
IMM GRANULOCYTES NFR BLD AUTO: 0 % (ref 0–0.5)
KETONES UR QL STRIP.AUTO: ABNORMAL MG/DL
LACTATE SERPL-SCNC: 1.5 MMOL/L (ref 0.4–2)
LEUKOCYTE ESTERASE UR QL STRIP.AUTO: NEGATIVE
LYMPHOCYTES # BLD: 0.8 K/UL (ref 0.8–3.5)
LYMPHOCYTES NFR BLD: 10 % (ref 12–49)
MCH RBC QN AUTO: 30.2 PG (ref 26–34)
MCHC RBC AUTO-ENTMCNC: 32.3 G/DL (ref 30–36.5)
MCV RBC AUTO: 93.5 FL (ref 80–99)
MONOCYTES # BLD: 1.1 K/UL (ref 0–1)
MONOCYTES NFR BLD: 14 % (ref 5–13)
MUCOUS THREADS URNS QL MICRO: ABNORMAL /LPF
NEUTS SEG # BLD: 6.1 K/UL (ref 1.8–8)
NEUTS SEG NFR BLD: 76 % (ref 32–75)
NITRITE UR QL STRIP.AUTO: NEGATIVE
NRBC # BLD: 0 K/UL (ref 0–0.01)
NRBC BLD-RTO: 0 PER 100 WBC
PH UR STRIP: 5 [PH] (ref 5–8)
PLATELET # BLD AUTO: 178 K/UL (ref 150–400)
PMV BLD AUTO: 11.1 FL (ref 8.9–12.9)
POTASSIUM SERPL-SCNC: 3.4 MMOL/L (ref 3.5–5.1)
PROT SERPL-MCNC: 5.9 G/DL (ref 6.4–8.2)
PROT UR STRIP-MCNC: NEGATIVE MG/DL
RBC # BLD AUTO: 4.64 M/UL (ref 4.1–5.7)
RBC #/AREA URNS HPF: ABNORMAL /HPF (ref 0–5)
SODIUM SERPL-SCNC: 141 MMOL/L (ref 136–145)
SOURCE, COVRS: ABNORMAL
SP GR UR REFRACTOMETRY: >1.03 (ref 1–1.03)
TROPONIN I SERPL-MCNC: <0.05 NG/ML
UR CULT HOLD, URHOLD: NORMAL
UROBILINOGEN UR QL STRIP.AUTO: 0.2 EU/DL (ref 0.2–1)
WBC # BLD AUTO: 8.1 K/UL (ref 4.1–11.1)
WBC URNS QL MICRO: ABNORMAL /HPF (ref 0–4)

## 2021-08-15 PROCEDURE — 80053 COMPREHEN METABOLIC PANEL: CPT

## 2021-08-15 PROCEDURE — 36415 COLL VENOUS BLD VENIPUNCTURE: CPT

## 2021-08-15 PROCEDURE — 84484 ASSAY OF TROPONIN QUANT: CPT

## 2021-08-15 PROCEDURE — 74011250636 HC RX REV CODE- 250/636: Performed by: STUDENT IN AN ORGANIZED HEALTH CARE EDUCATION/TRAINING PROGRAM

## 2021-08-15 PROCEDURE — 83605 ASSAY OF LACTIC ACID: CPT

## 2021-08-15 PROCEDURE — 87635 SARS-COV-2 COVID-19 AMP PRB: CPT

## 2021-08-15 PROCEDURE — 99285 EMERGENCY DEPT VISIT HI MDM: CPT

## 2021-08-15 PROCEDURE — 93005 ELECTROCARDIOGRAM TRACING: CPT

## 2021-08-15 PROCEDURE — 85025 COMPLETE CBC W/AUTO DIFF WBC: CPT

## 2021-08-15 PROCEDURE — 96360 HYDRATION IV INFUSION INIT: CPT

## 2021-08-15 PROCEDURE — 81001 URINALYSIS AUTO W/SCOPE: CPT

## 2021-08-15 RX ADMIN — SODIUM CHLORIDE, POTASSIUM CHLORIDE, SODIUM LACTATE AND CALCIUM CHLORIDE 1000 ML: 600; 310; 30; 20 INJECTION, SOLUTION INTRAVENOUS at 16:23

## 2021-08-15 NOTE — ED NOTES
Mother and  contacted to make aware of patient positive COVID result and patient being discharged back to sending facility.

## 2021-08-15 NOTE — ED NOTES
Patient being transported out via [facility] stretcher in no sign of distress or discomfort back to [facility] facility.

## 2021-08-15 NOTE — DISCHARGE INSTRUCTIONS
Patient presented to the ED with fever. Patient is Covid positive. No signs of hypoxia, sepsis. Patient appears at his baseline, recommend following CDC guidelines for isolation precautions and patient care.

## 2021-08-15 NOTE — ED TRIAGE NOTES
Patient arrived via EMS. EMS reports they received a call today patient being generalized unwell and having a fever as high as 102 at [facility]. EMS reports there have been other patients from facility admitted with COVID this week.

## 2021-08-15 NOTE — ED PROVIDER NOTES
Patient is a 54-year-old male with a complex medical history coming from a skilled nursing facility specializes in traumatic brain injury. EMS was called out for an ill patient. Per facility patient was 102 axillary. Patient has hypotension on arrival but appears to have some hypotension at baseline based on previous notes and charts. Of note patient's facility had Covid patient yesterday. Patient is awake and alert and easily arousable. Denies any significant pain. But exam and history is difficult based on patient's traumatic brain injury. The history is provided by the patient, medical records and the EMS personnel. Fever   This is a new problem. The current episode started 1 to 2 hours ago. The problem occurs constantly. The problem has been gradually improving. The maximum temperature noted was 102 - 102.9 F. The temperature was taken using an axillary reading. Associated symptoms include cough. Pertinent negatives include no diarrhea and no vomiting. He has tried nothing for the symptoms. The treatment provided no relief.         Past Medical History:   Diagnosis Date    Cancer 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 8/10/2010    Mood disorder in conditions classified elsewhere 8/10/2010    Other specified transient mental disorders due to conditions classified elsewhere(293.89) 8/10/2010    Psychiatric disorder     ANXIETY    PUD (peptic ulcer disease)     HX    Quadriplegia, unspecified ([address] 8/10/2010    Skin lesion 2/10/2014    Small bowel obstruction ([address] 4/6/2015       Past Surgical History:   Procedure Laterality Date    COLONOSCOPY N/A 4/28/2017    COLONOSCOPY / EGD  performed by Benigno Garcia [facility] at [facility] ENDOSCOPY    HX OTHER SURGICAL 4/12/15    ANTONIO, exp lap, sm bowel resection    HX OTHER SURGICAL  4/15/15    exp lap with wash out     HX OTHER SURGICAL      MOHS    HX OTHER SURGICAL      BACLOFEN PUMP INSERTED    [place] ABDOMEN SURGERY PROC UNLISTED  2014    \"Ulcer surgery\"/Implantable Baclofen pump         Family History:   Problem Relation Age of Onset    Asthma Mother     Cancer Mother         BREAST    Cancer Father         Waldenstroms macroglobulinemia     Arthritis-osteo Father     Elevated Lipids Father     Hypertension Father     Asthma Brother     Anesth Problems Neg Hx        Social History     Socioeconomic History    Marital status: SINGLE     Spouse name: Not on file    Number of children: Not on file    Years of education: Not on file    Highest education level: Not on file   Occupational History    Not on file   Tobacco Use    Smoking status: Never Smoker    Smokeless tobacco: Never Used   Substance and Sexual Activity    Alcohol use: No     Comment: prior alcoholic    Drug use: No    Sexual activity: Not on file   Other Topics Concern    Not on file   Social History Narrative    Not on file     Social Determinants of Health     Financial Resource Strain:     Difficulty of Paying Living Expenses:    Food Insecurity:     Worried About Running Out of Food in the Last Year:     Ran Out of Food in the Last Year:    Transportation Needs:     Lack of Transportation (Medical):      Lack of Transportation (Non-Medical):    Physical Activity:     Days of Exercise per Week:     Minutes of Exercise per Session:    Stress:     Feeling of Stress :    Social Connections:     Frequency of Communication with Friends and Family:     Frequency of Social Gatherings with Friends and Family:     Attends Latter day Services:     Active Member of Clubs or Organizations:     Attends Club or Organization Meetings:     Marital Status:    Intimate Partner Violence:     Fear of Current or Ex-Partner:     Emotionally Abused:  Physically Abused:     Sexually Abused: ALLERGIES: Invega [paliperidone]    Review of Systems   Constitutional: Positive for fever. HENT: Negative. Eyes: Negative. Respiratory: Positive for cough. Cardiovascular: Negative. Gastrointestinal: Negative. Negative for diarrhea and vomiting. Endocrine: Negative. Genitourinary: Negative. Musculoskeletal: Negative. Skin: Negative. Allergic/Immunologic: Negative. Neurological: Negative. Hematological: Negative. Psychiatric/Behavioral: Negative. Vitals:    08/15/21 1904 08/15/21 1915 08/15/21 1930 08/15/21 1936   BP:  96/75 111/68    Pulse: 72 75 73 74   Resp: 10 16 16 19   Temp:       SpO2: 95% 94%     Weight:                Physical Exam  Vitals and nursing note reviewed. Constitutional:       General: He is not in acute distress. Appearance: Normal appearance. He is ill-appearing. Comments: Patient presenting from facility with fever. He has significant TBI, history of stroke. No obvious injuries noted. HENT:      Head: Normocephalic and atraumatic. Right Ear: External ear normal.      Left Ear: External ear normal.      Nose: Nose normal.      Mouth/Throat:      Mouth: Mucous membranes are moist.      Pharynx: Oropharynx is clear. No posterior oropharyngeal erythema. Eyes:      Extraocular Movements: Extraocular movements intact. Conjunctiva/sclera: Conjunctivae normal.   Cardiovascular:      Rate and Rhythm: Normal rate. Pulses: Normal pulses. Heart sounds: Normal heart sounds. Pulmonary:      Effort: Pulmonary effort is normal.      Breath sounds: Normal breath sounds. Chest:      Chest wall: No deformity or tenderness. Abdominal:      General: Abdomen is flat. There is no distension. Tenderness: There is no abdominal tenderness. Musculoskeletal:         General: No deformity or signs of injury. Normal range of motion.       Cervical back: Normal range of motion and neck supple. No tenderness. Skin:     General: Skin is warm and dry. Capillary Refill: Capillary refill takes less than 2 seconds. Neurological:      General: No focal deficit present. Mental Status: He is alert and oriented to person, place, and time. Mental status is at baseline. Psychiatric:         Mood and Affect: Mood normal.         Behavior: Behavior normal.          MDM     LABORATORY RESULTS:  Labs Reviewed   COVID-19 RAPID TEST - Abnormal; Notable for the following components:       Result Value    COVID-19 rapid test Detected (*)     All other components within normal limits   CBC WITH AUTOMATED DIFF - Abnormal; Notable for the following components:    NEUTROPHILS 76 (*)     LYMPHOCYTES 10 (*)     MONOCYTES 14 (*)     ABS. MONOCYTES 1.1 (*)     All other components within normal limits   METABOLIC PANEL, COMPREHENSIVE - Abnormal; Notable for the following components:    Potassium 3.4 (*)     Glucose 134 (*)     BUN 21 (*)     BUN/Creatinine ratio 29 (*)     Calcium 7.7 (*)     Protein, total 5.9 (*)     Albumin 3.1 (*)     All other components within normal limits   URINALYSIS W/MICROSCOPIC - Abnormal; Notable for the following components:    Appearance HAZY (*)     Specific gravity >1.030 (*)     Glucose >1,000 (*)     Ketone TRACE (*)     Blood TRACE (*)     Mucus 2+ (*)     All other components within normal limits   URINE CULTURE HOLD SAMPLE   TROPONIN I   LACTIC ACID   SAMPLES BEING HELD       IMAGING RESULTS:  No orders to display       MEDICATIONS GIVEN:  Medications   lactated ringers bolus infusion 1,000 mL (0 mL IntraVENous IV Completed 8/15/21 9965)       Differential diagnosis: COVID-19, sepsis, hypotension, autonomic dysrhythmia from TBI, fever, URI    ED physician interpretation of EKG: No STEMI. Rhythm: normal sinus rhythm; and regular . Rate (approx.): 76; EKG documented and interpreted by Mariluz Vasquez MD  ED physician interpretation of laboratory results: Patient sepsis lab work-up without signs of critical values or overt infection. Lactic, white blood cell count within normal limits. Troponin unmeasurable. MDM: Patient is a 50-year-old male with complex medical history presenting from his skilled nursing facility with fever and reported hypotension found to be Covid positive at his baseline for his blood pressures without signs of sepsis appropriate for discharge back to his facility and further treatment and evaluation. Patient's vital signs are stable, patient's mental status much improved after 1 L fluid bolus. Patient is vaccinated for COVID-19. Patient's doctor from his facility called to discuss recent Covid findings in his patients who are all vaccinated. Discussed possible options for treatment including infusion clinic at [facility]. Patient's physician looked this up and will work on getting his patient's infusions when they are Covid positive. Key discharge instructions: Patient presented to the ED with fever. Patient is Covid positive. No signs of hypoxia, sepsis. Patient appears at his baseline, recommend following CDC guidelines for isolation precautions and patient care. The patient has been re-evaluated and feeling better. Patient is stable for discharge. All available radiology and laboratory results have been reviewed with patient and/or available family. Patient and/or family verbally conveyed their understanding and agreement of the patient's signs, symptoms, diagnosis, treatment and prognosis and additionally agree to follow-up as recommended in the discharge instructions or to return to the Emergency Department should their condition change or worsen prior to their follow-up appointment. All questions have been answered and patient and/or available family express understanding. IMPRESSION:  1. COVID-19    2. Fever, unspecified fever cause        DISPOSITION: Discharged    Louis Davis, MD        Procedures

## 2021-08-15 NOTE — TELEPHONE ENCOUNTER
Spoke to pts mother Abdullahi Oliveros, she inquired about pts recent diagnosis of + COVID-19, she inquired about treatment options for her son. She inquired about Hydroxychloroquine and Ivermectin as treatment options, I mentioned to her that it there was no evidence based criteria and guidelines for treatment of these medications, and it could have some potential risk associated  side effects. I discussed that her that Maxime Martin may fit the criteria for monoclonal antibody treatment. Casirvimab-imdevimab (600-600mg, admin as a single IV dose)  I advised I will inquire tomorrow for him.

## 2021-08-16 ENCOUNTER — TELEPHONE (OUTPATIENT)
Dept: FAMILY MEDICINE CLINIC | Age: 51
End: 2021-08-16

## 2021-08-16 LAB
ATRIAL RATE: 76 BPM
CALCULATED P AXIS, ECG09: 12 DEGREES
CALCULATED R AXIS, ECG10: 3 DEGREES
CALCULATED T AXIS, ECG11: 6 DEGREES
DIAGNOSIS, 93000: NORMAL
P-R INTERVAL, ECG05: 134 MS
Q-T INTERVAL, ECG07: 392 MS
QRS DURATION, ECG06: 86 MS
QTC CALCULATION (BEZET), ECG08: 441 MS
VENTRICULAR RATE, ECG03: 76 BPM

## 2021-08-16 RX ORDER — DIPHENHYDRAMINE HYDROCHLORIDE 50 MG/ML
50 INJECTION, SOLUTION INTRAMUSCULAR; INTRAVENOUS
Status: ACTIVE | OUTPATIENT
Start: 2021-08-18 | End: 2021-08-18

## 2021-08-16 RX ORDER — HYDROCORTISONE SODIUM SUCCINATE 100 MG/2ML
100 INJECTION, POWDER, FOR SOLUTION INTRAMUSCULAR; INTRAVENOUS
Status: DISCONTINUED | OUTPATIENT
Start: 2021-08-18 | End: 2021-08-19 | Stop reason: HOSPADM

## 2021-08-16 RX ORDER — ACETAMINOPHEN 325 MG/1
650 TABLET ORAL
Status: ACTIVE | OUTPATIENT
Start: 2021-08-18 | End: 2021-08-18

## 2021-08-18 ENCOUNTER — HOSPITAL ENCOUNTER (OUTPATIENT)
Dept: INFUSION THERAPY | Age: 51
Discharge: HOME | End: 2021-08-18
Payer: MEDICARE

## 2021-08-18 VITALS
OXYGEN SATURATION: 96 % | RESPIRATION RATE: 18 BRPM | TEMPERATURE: 98.3 F | SYSTOLIC BLOOD PRESSURE: 97 MMHG | DIASTOLIC BLOOD PRESSURE: 62 MMHG | HEART RATE: 72 BPM

## 2021-08-18 PROCEDURE — 74011000636 HC RX REV CODE- 636: Performed by: FAMILY MEDICINE

## 2021-08-18 PROCEDURE — M0243 CASIRIVI AND IMDEVI INFUSION: HCPCS

## 2021-08-18 PROCEDURE — 74011250636 HC RX REV CODE- 250/636: Performed by: FAMILY MEDICINE

## 2021-08-18 RX ADMIN — IMDEVIMAB: 1332 INJECTION, SOLUTION, CONCENTRATE INTRAVENOUS at 14:04

## 2021-08-18 NOTE — PROGRESS NOTES
[facility] [place] Short Note                   Date: 2021    Name: Carolyn Portillo    MRN: [id number]         : 1970    Treatment: Regeneron    Mr. Jennifer Hernandez was assessed and education was provided. Problem: Patient Education:  Go to Education Activity  Goal: Patient/Family Education  Outcome: Progressing Towards Goal    Symptoms began 8/15/2021: initial symptoms were fever and lethargy. Current symptoms include; none     24 gauge PIV established in the RFa  Lines:   Peripheral IV 21 Right Forearm (Active)   Site Assessment Clean, dry, & intact 21 1332   Phlebitis Assessment 0 21 1332   Infiltration Assessment 0 21 133   Dressing Status Clean, dry, & intact;New;Occlusive 21   Dressing Type Transparent 21   Hub Color/Line Status Yellow; Flushed;Patent 21        Mr. Augustin vitals were reviewed prior to and after treatment. Patient Vitals for the past 12 hrs:   Temp Pulse Resp BP SpO2   21 1557 -- 72 -- 97/62 --   21 1328 98.3 °F (36.8 °C) 73 18 100/69 96 %         Medications given:  Medications Administered     casirivimab ([id number]) 600 mg, imdevimab ([id number]) 600 mg in 0.9% sodium chloride, overfill volume 285 mL IVPB     Admin Date  2021 Action  New Bag Dose   Rate  285 mL/hr Route  IntraVENous Administered By  Emily Cisneros RN              Provider's direct contact information on file. Provider notified prior to starting infusion. Mr. Jennifer Hernandez tolerated the treatment without complaints. Patient observed for one hour post infusion. No adverse reactions noted. PIV flushed and removed, 2x2 and coban placed. Mr. Jennifer Hernandez was discharged from [place] [address] in stable condition at 1610. Patient provided with AVS upon discharge. No future appointments. Patient to follow up with referring provider.      Kushal Orlando RN  2021  1:38 PM

## 2022-03-19 PROBLEM — D50.0 IRON DEFICIENCY ANEMIA DUE TO CHRONIC BLOOD LOSS: Status: ACTIVE | Noted: 2018-11-20

## 2022-03-19 PROBLEM — S06.9XAA TBI (TRAUMATIC BRAIN INJURY) (HCC): Status: ACTIVE | Noted: 2017-11-22

## 2022-09-01 ENCOUNTER — HOSPITAL ENCOUNTER (EMERGENCY)
Age: 52
Discharge: HOME | End: 2022-09-01
Attending: EMERGENCY MEDICINE
Payer: MEDICARE

## 2022-09-01 ENCOUNTER — APPOINTMENT (OUTPATIENT)
Dept: GENERAL RADIOLOGY | Age: 52
End: 2022-09-01
Attending: PHYSICIAN ASSISTANT
Payer: MEDICARE

## 2022-09-01 ENCOUNTER — APPOINTMENT (OUTPATIENT)
Dept: GENERAL RADIOLOGY | Age: 52
End: 2022-09-01
Attending: EMERGENCY MEDICINE
Payer: MEDICARE

## 2022-09-01 VITALS
SYSTOLIC BLOOD PRESSURE: 140 MMHG | TEMPERATURE: 99.2 F | DIASTOLIC BLOOD PRESSURE: 76 MMHG | OXYGEN SATURATION: 94 % | RESPIRATION RATE: 16 BRPM | HEART RATE: 117 BPM

## 2022-09-01 DIAGNOSIS — S52.502A CLOSED FRACTURE OF DISTAL END OF LEFT RADIUS, UNSPECIFIED FRACTURE MORPHOLOGY, INITIAL ENCOUNTER: Primary | ICD-10-CM

## 2022-09-01 PROCEDURE — 73110 X-RAY EXAM OF WRIST: CPT

## 2022-09-01 PROCEDURE — 96372 THER/PROPH/DIAG INJ SC/IM: CPT

## 2022-09-01 PROCEDURE — 73100 X-RAY EXAM OF WRIST: CPT

## 2022-09-01 PROCEDURE — 75810000303 HC CLSD TRMT  FRACTURE/DISLOCATION W/  ANES

## 2022-09-01 PROCEDURE — 99284 EMERGENCY DEPT VISIT MOD MDM: CPT

## 2022-09-01 PROCEDURE — 74011250636 HC RX REV CODE- 250/636: Performed by: EMERGENCY MEDICINE

## 2022-09-01 PROCEDURE — 74011000250 HC RX REV CODE- 250: Performed by: EMERGENCY MEDICINE

## 2022-09-01 RX ORDER — LIDOCAINE HYDROCHLORIDE AND EPINEPHRINE 10; 10 MG/ML; UG/ML
15 INJECTION, SOLUTION INFILTRATION; PERINEURAL
Status: COMPLETED | OUTPATIENT
Start: 2022-09-01 | End: 2022-09-01

## 2022-09-01 RX ORDER — HYDROCODONE BITARTRATE AND ACETAMINOPHEN 5; 325 MG/1; MG/1
1 TABLET ORAL
Qty: 12 TABLET | Refills: 0 | Status: SHIPPED | OUTPATIENT
Start: 2022-09-01 | End: 2022-09-04

## 2022-09-01 RX ORDER — HYDROMORPHONE HYDROCHLORIDE 1 MG/ML
1 INJECTION, SOLUTION INTRAMUSCULAR; INTRAVENOUS; SUBCUTANEOUS ONCE
Status: COMPLETED | OUTPATIENT
Start: 2022-09-01 | End: 2022-09-01

## 2022-09-01 RX ORDER — POLYETHYLENE GLYCOL 3350 17 G/17G
17 POWDER, FOR SOLUTION ORAL DAILY
Qty: 85 G | Refills: 0 | Status: SHIPPED | OUTPATIENT
Start: 2022-09-01 | End: 2022-09-06

## 2022-09-01 RX ADMIN — LIDOCAINE HYDROCHLORIDE,EPINEPHRINE BITARTRATE 150 MG: 10; .01 INJECTION, SOLUTION INFILTRATION; PERINEURAL at 20:32

## 2022-09-01 RX ADMIN — HYDROMORPHONE HYDROCHLORIDE 1 MG: 1 INJECTION, SOLUTION INTRAMUSCULAR; INTRAVENOUS; SUBCUTANEOUS at 20:31

## 2022-09-01 NOTE — ED PROVIDER NOTES
48-year-old male with a history of traumatic brain injury presents with left wrist pain. According to caretakers the patient got up sometime in the middle of the night last night and fell. This morning the nursing staff noticed that the patient had swelling and bruising to the left wrist.  He was taken to an urgent care facility where he was found to have an acute distal radius fracture. He was referred to the ED for further management. Additional history and review of systems is limited by history of TBI.        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 8/10/2010    Mood disorder in conditions classified elsewhere 8/10/2010    Other specified transient mental disorders due to conditions classified elsewhere(293.89) 8/10/2010    Psychiatric disorder     ANXIETY    PUD (peptic ulcer disease)     HX    Quadriplegia, unspecified ([address] 8/10/2010    Skin lesion 2/10/2014    Small bowel obstruction ([address] 4/6/2015       Past Surgical History:   Procedure Laterality Date    COLONOSCOPY N/A 4/28/2017    COLONOSCOPY / EGD  performed by Eder Villasenor MD at [facility] ENDOSCOPY    HX OTHER SURGICAL  4/12/15    ANTONIO, exp lap, sm bowel resection    HX OTHER SURGICAL  4/15/15    exp lap with wash out     HX OTHER SURGICAL      MOHS    HX OTHER SURGICAL      BACLOFEN PUMP INSERTED    [place] ABDOMEN SURGERY PROC UNLISTED  2014    \"Ulcer surgery\"/Implantable Baclofen pump         Family History:   Problem Relation Age of Onset    Asthma Mother     Cancer Mother         BREAST    Cancer Father         Waldenstroms macroglobulinemia     OSTEOARTHRITIS Father     Elevated Lipids Father     Hypertension Father     Asthma Brother     Anesth Problems Neg Hx Social History     Socioeconomic History    Marital status: SINGLE     Spouse name: Not on file    Number of children: Not on file    Years of education: Not on file    Highest education level: Not on file   Occupational History    Not on file   Tobacco Use    Smoking status: Never    Smokeless tobacco: Never   Substance and Sexual Activity    Alcohol use: No     Comment: prior alcoholic    Drug use: No    Sexual activity: Not on file   Other Topics Concern    Not on file   Social History Narrative    Not on file     Social Determinants of Health     Financial Resource Strain: Not on file   Food Insecurity: Not on file   Transportation Needs: Not on file   Physical Activity: Not on file   Stress: Not on file   Social Connections: Not on file   Intimate Partner Violence: Not on file   Housing Stability: Not on file         ALLERGIES: Invega [paliperidone]    Review of Systems   Unable to perform ROS: Mental status change   Traumatic brain injury      Vitals:    09/01/22 1702   BP: 113/80   Pulse: (!) 104   Resp: 16   Temp: 98.4 °F (36.9 °C)   SpO2: 95%            Physical Exam  Vitals and nursing note reviewed. Constitutional:       General: He is not in acute distress. Appearance: Normal appearance. He is not ill-appearing, toxic-appearing or diaphoretic. HENT:      Head: Normocephalic. Eyes:      Extraocular Movements: Extraocular movements intact. Cardiovascular:      Rate and Rhythm: Normal rate. Pulses: Normal pulses. Pulmonary:      Effort: Pulmonary effort is normal. No respiratory distress. Abdominal:      General: There is no distension. Musculoskeletal:         General: Normal range of motion. Cervical back: Normal range of motion. Comments: Swelling of the left wrist.  Left hand chronically contracted. Skin:     General: Skin is dry. Capillary Refill: Capillary refill takes less than 2 seconds. Neurological:      Mental Status: He is alert.  Mental status is at baseline. Psychiatric:         Mood and Affect: Mood normal.        MDM  Number of Diagnoses or Management Options  Closed fracture of distal end of left radius, unspecified fracture morphology, initial encounter  Diagnosis management comments:   Plain film shows left distal radius fracture. Orthopedic surgery consulted. The orthopedic surgery PA attempted hematoma block and reduction which was not successful. Patient placed in splint. Orthopedic surgery recommended urgent outpatient follow-up. Discussed my clinical impression(s), any labs and/or radiology results with the patient's caretaker. I answered any questions and addressed any concerns. Discussed the importance of following up with their primary care physician and/or specialist(s). Discussed signs or symptoms that would warrant return back to the ER for further evaluation. The patient's caretaker is agreeable with discharge. Perfect Serve Consult  7:18 PM    ED Room Number: ER20/20  Patient Name and age:  Sandra Angle 47 y.o.  male  Working Diagnosis: Closed fracture of distal end of left radius, unspecified fracture morphology, initial encounter      Images to follow. No thinners. Patient with developmental delay.   Mechanism not clear           Procedures

## 2022-09-01 NOTE — ED TRIAGE NOTES
He arrives from [facility] with a disc aftera left arm injury. They said it was fractured. He is a resident in a post brain injury facility. He is accompanied by a staff member. She says they do not know how the injury happened.  It was splinted at [facility].
yellow

## 2022-09-02 NOTE — CONSULTS
ORTHOPAEDIC CONSULT NOTE    Subjective:     Date of Consultation:  September 1, 2022  Referring Physician:  Elizabeth Balbuena is a 47 y.o. male with PMH significant for TBI with significant debilitation now wheelchair bound and significant left hand contractures is seen for left wrist and arm pain following an unwitnessed fall at his care facility. Per facility employees may have fallen out of bed as he was found on the floor early this morning. He ws sent to [facility] who stated he had a broken wrist and sent him to ED for further management. Patient hard to understand in terms of communication but able to verbalize that he has left wrist pain but that his only \"good limb\" is his right arm. Imagine here shows displaced distal radius fracture for which we have been asked to see the patient.   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 08/10/2010     Family History   Problem Relation Age of Onset    Asthma Mother     Cancer Mother         BREAST    Cancer Father         Waldenstroms macroglobulinemia     OSTEOARTHRITIS Father     Elevated Lipids Father     Hypertension Father     Asthma Brother     Anesth Problems Neg Hx       Social History     Tobacco Use    Smoking status: Never    Smokeless tobacco: Never   Substance Use Topics    Alcohol use: No     Comment: prior alcoholic     Past Medical History:   Diagnosis Date    Cancer ([facility] [address]     BCCA    Coagulation disorder ([facility] [address]     HX ANEMIA    Dysphagia 8/10/2010    GERD (gastroesophageal reflux disease)     Ill-defined condition quadripalegia    Ill-defined condition 2001    TBI    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 8/10/2010    Mood disorder in conditions classified elsewhere 8/10/2010    Other specified transient mental disorders due to conditions classified elsewhere(293.89) 8/10/2010    Psychiatric disorder     ANXIETY    PUD (peptic ulcer disease)     HX    Quadriplegia, unspecified ([facility] [address] 8/10/2010    Skin lesion 2/10/2014    Small bowel obstruction ([facility] [address] 4/6/2015      Past Surgical History:   Procedure Laterality Date    COLONOSCOPY N/A 4/28/2017    COLONOSCOPY / EGD  performed by Maday Muir MD at [address] [place] [address]  4/12/15    ANTONIO, exp lap, sm bowel resection    HX OTHER SURGICAL  4/15/15    exp lap with wash out     HX OTHER SURGICAL      MOHS    HX OTHER SURGICAL      BACLOFEN PUMP INSERTED    CT ABDOMEN SURGERY PROC UNLISTED  2014    \"Ulcer surgery\"/Implantable Baclofen pump      Prior to Admission medications    Medication Sig Start Date End Date Taking? Authorizing Provider   bisacodyl (DULCOLAX) 10 mg suppository 10 mg.    Provider, Historical   dextromethorphan-quiNIDine (NUEDEXTA) 20-10 mg per capsule Take 1 Cap by mouth. Provider, Historical   vortioxetine (TRINTELLIX) 20 mg tablet Take  by mouth daily. Provider, Historical   multivitamin, tx-iron-ca-min (THERA-M W/ IRON) 9 mg iron-400 mcg tab tablet Take 1 Tab by mouth daily. Provider, Historical   QUEtiapine (SEROQUEL) 100 mg tablet Take 100 mg by mouth daily. Provider, Historical   cholecalciferol (VITAMIN D3) 50,000 unit capsule Take  by mouth every seven (7) days. Provider, Historical   eszopiclone (LUNESTA) 3 mg tablet Take  by mouth nightly. Provider, Historical   omeprazole (PRILOSEC) 40 mg capsule Take 40 mg by mouth daily. Provider, Historical   B.infantis-B.ani-B.long-B.bifi (PROBIOTIC 4X) 10-15 mg TbEC Take  by mouth daily.     Provider, Historical   senna (SENEXON) 8.6 mg tablet Take 1 Tab by mouth two (2) times a day. Provider, Historical   melatonin 3 mg tablet Take 1 tablet by mouth 2 hours prior to bedtime    Provider, Historical   trospium (SANCTURA) 20 mg tablet Take  by mouth two (2) times a day. Provider, Historical   clonazePAM (KLONOPIN) 0.5 mg tablet Take 3 mg by mouth three (3) times daily. Provider, Historical   traZODone (DESYREL) 300 mg tablet Take 300 mg by mouth nightly. Provider, Historical   propranolol (INDERAL) 80 mg tablet Take 60 mg by mouth two (2) times a day. Hold if systolic  Pressure is below 100    Provider, Historical   ergocalciferol (VITAMIN D) 50,000 unit capsule Take 50,000 Units by mouth every seven (7) days. On Mondays    Other, MD Mari   budesonide (RHINOCORT AQUA) 32 mcg/Actuation nasal spray 2 Sprays by Both Nostrils route two (2) times a day. Other, MD Mari     No current facility-administered medications for this encounter. Current Outpatient Medications   Medication Sig    bisacodyl (DULCOLAX) 10 mg suppository 10 mg.    dextromethorphan-quiNIDine (NUEDEXTA) 20-10 mg per capsule Take 1 Cap by mouth.    vortioxetine (TRINTELLIX) 20 mg tablet Take  by mouth daily. multivitamin, tx-iron-ca-min (THERA-M W/ IRON) 9 mg iron-400 mcg tab tablet Take 1 Tab by mouth daily. QUEtiapine (SEROQUEL) 100 mg tablet Take 100 mg by mouth daily. cholecalciferol (VITAMIN D3) 50,000 unit capsule Take  by mouth every seven (7) days. eszopiclone (LUNESTA) 3 mg tablet Take  by mouth nightly. omeprazole (PRILOSEC) 40 mg capsule Take 40 mg by mouth daily. B.infantis-B.ani-B.long-B.bifi (PROBIOTIC 4X) 10-15 mg TbEC Take  by mouth daily. senna (SENEXON) 8.6 mg tablet Take 1 Tab by mouth two (2) times a day. melatonin 3 mg tablet Take 1 tablet by mouth 2 hours prior to bedtime    trospium (SANCTURA) 20 mg tablet Take  by mouth two (2) times a day. clonazePAM (KLONOPIN) 0.5 mg tablet Take 3 mg by mouth three (3) times daily.     traZODone (DESYREL) 300 mg tablet Take 300 mg by mouth nightly. propranolol (INDERAL) 80 mg tablet Take 60 mg by mouth two (2) times a day. Hold if systolic  Pressure is below 100    ergocalciferol (VITAMIN D) 50,000 unit capsule Take 50,000 Units by mouth every seven (7) days. On     budesonide (RHINOCORT AQUA) 32 mcg/Actuation nasal spray 2 Sprays by Both Nostrils route two (2) times a day. Allergies   Allergen Reactions    Invega [Paliperidone] Unknown (comments)        Review of Systems:  Review of systems not obtained due to patient factors. Mental Status:  Significant cognitive changes due to TBI    Objective:     Patient Vitals for the past 8 hrs:   BP Temp Pulse Resp SpO2   22 1702 113/80 98.4 °F (36.9 °C) (!) 104 16 95 %     Temp (24hrs), Av.4 °F (36.9 °C), Min:98.4 °F (36.9 °C), Max:98.4 °F (36.9 °C)      EXAM: awake and alert sitting in wheelchair; NAD; agreeable to exam  Left hand with significant finger contractures forcing hand into fist but fingers can be extended somewhat; significant volar swelling and ecchymosis noted over the distal forearm near the wrist crease; dorsal fracture stepoff palpable; patient unable to move fingers to command  Patient with bilateral AFOs    Imaging Review:   EXAM: XR WRIST LT AP/LAT/OBL MIN 3V     INDICATION: Trauma. COMPARISON: None. FINDINGS: Three views of the left wrist demonstrate an acute comminuted  intra-articular distal radius fracture with dorsal displacement and volar apex  angulation. Possible acute nondisplaced ulnar styloid fracture. Radiocarpal  alignment is otherwise preserved. Soft tissue swelling. IMPRESSION  Acute distal radius (and possible ulnar styloid) fracture(s). Labs: No results found for this or any previous visit (from the past 24 hour(s)). Impression:     Active Problems:    * No active hospital problems.  *      Plan:     Acute left distal radius fracture with dorsal displacement - indicated for reduction but will be technically difficult considering patient TBI and hand contractures. Patient informed of need to reduce fracture and plan to du under hematoma block likely with some pain and he agrees to proceed.  Spoke at length with parents of patient on phone Amara Nye [phone]) who is also in agreement  Plan to close reduce if able and apply sugar tong splint  Sling for support and comfort  Will need outpatient follow-up for further treatment discussions - casting vs surgical management; with Dr Sandor Torres at [facility] this week  Disposition per ED attending    Dr Darby Mae aware of patient and in agreement with current plan of care      Jessee Winkler PA-C  Orthopedic Trauma Service  [facility]

## 2022-09-02 NOTE — PROCEDURES
PROCEDURE NOTE - FRACTURE REDUCTION: The patient was found to have a displaced left distal radius fracture indicated for reduction. Patient unable to be sedated due to TBI history and reduction would be attempted using hematoma block IM Dilaudid was given for pain relief and fracture area was infiltrated with 10cc of 1% lidocaine. A longitudinal traction in conjunction with re-creation of the injury maneuver was applied reducing the fracture. Due to patient spasticity he had significant resistance to manipulation and would strain in extreme wrist flexion and alterneately with significant elbow extension. Patient was placed in a sugar tong splint and further reduction attempt was made working to get wrist to length and molded into a position of relative wrist flexion. The patient was aroused from anesthesia and tolerated the procedure well. Post-reduction plain films reviewed which showed persistent dorsal displacement of fracture. Considering the technical difficulty of the reduction attempt it was decided to leave the fracture splinted in place and allow for close office follow-up. The extremity was neurovascularly intact post reduction and splint placement.      [place] [id number] [facility]

## 2022-09-02 NOTE — DISCHARGE INSTRUCTIONS
Thank you for allowing us to provide you with medical care today. We realize that you have many choices for your emergency care needs. We thank you for choosing [facility]. Please choose us in the future for any continued health care needs. The exam and treatment you received in the Emergency Department were for an emergent problem and are not intended as complete care. It is important that you follow up with a doctor, nurse practitioner, or physician's assistant for ongoing care. If your symptoms worsen or you do not improve as expected and you are unable to reach your usual health care provider, you should return to the Emergency Department. We are available 24 hours a day. Please make an appointment with your health care provider(s) for follow up of your Emergency Department visit. Take this sheet with you when you go to your follow-up visit.

## 2022-10-04 ENCOUNTER — OFFICE VISIT (OUTPATIENT)
Dept: FAMILY MEDICINE CLINIC | Age: 52
End: 2022-10-04
Payer: MEDICARE

## 2022-10-04 VITALS
SYSTOLIC BLOOD PRESSURE: 120 MMHG | WEIGHT: 140 LBS | HEART RATE: 98 BPM | BODY MASS INDEX: 21.22 KG/M2 | TEMPERATURE: 99.2 F | OXYGEN SATURATION: 96 % | HEIGHT: 68 IN | RESPIRATION RATE: 16 BRPM | DIASTOLIC BLOOD PRESSURE: 88 MMHG

## 2022-10-04 DIAGNOSIS — R53.81 MALAISE: ICD-10-CM

## 2022-10-04 DIAGNOSIS — Z00.00 ENCOUNTER FOR MEDICARE ANNUAL WELLNESS EXAM: ICD-10-CM

## 2022-10-04 DIAGNOSIS — R35.0 FREQUENCY OF MICTURITION: ICD-10-CM

## 2022-10-04 DIAGNOSIS — Z11.59 NEED FOR HEPATITIS C SCREENING TEST: ICD-10-CM

## 2022-10-04 DIAGNOSIS — R73.03 PREDIABETES: Primary | ICD-10-CM

## 2022-10-04 DIAGNOSIS — R63.4 WEIGHT LOSS: ICD-10-CM

## 2022-10-04 DIAGNOSIS — R10.11 RIGHT UPPER QUADRANT PAIN: ICD-10-CM

## 2022-10-04 LAB — HBA1C MFR BLD HPLC: 5.1 %

## 2022-10-04 PROCEDURE — 99214 OFFICE O/P EST MOD 30 MIN: CPT | Performed by: FAMILY MEDICINE

## 2022-10-04 PROCEDURE — G8420 CALC BMI NORM PARAMETERS: HCPCS | Performed by: FAMILY MEDICINE

## 2022-10-04 PROCEDURE — G8510 SCR DEP NEG, NO PLAN REQD: HCPCS | Performed by: FAMILY MEDICINE

## 2022-10-04 PROCEDURE — 3017F COLORECTAL CA SCREEN DOC REV: CPT | Performed by: FAMILY MEDICINE

## 2022-10-04 PROCEDURE — G8427 DOCREV CUR MEDS BY ELIG CLIN: HCPCS | Performed by: FAMILY MEDICINE

## 2022-10-04 PROCEDURE — 83036 HEMOGLOBIN GLYCOSYLATED A1C: CPT | Performed by: FAMILY MEDICINE

## 2022-10-04 PROCEDURE — G0439 PPPS, SUBSEQ VISIT: HCPCS | Performed by: FAMILY MEDICINE

## 2022-10-04 RX ORDER — ESCITALOPRAM OXALATE 20 MG/1
20 TABLET ORAL DAILY
COMMUNITY

## 2022-10-04 RX ORDER — LACTOSE-REDUCED FOOD 0.04G-1/ML
LIQUID (ML) ORAL
COMMUNITY

## 2022-10-04 RX ORDER — OXYCODONE AND ACETAMINOPHEN 5; 325 MG/1; MG/1
TABLET ORAL
COMMUNITY

## 2022-10-04 RX ORDER — LANOLIN ALCOHOL/MO/W.PET/CERES
1000 CREAM (GRAM) TOPICAL DAILY
COMMUNITY

## 2022-10-04 RX ORDER — AMOXICILLIN 250 MG
1 CAPSULE ORAL 2 TIMES DAILY
COMMUNITY

## 2022-10-04 NOTE — PROGRESS NOTES
Chief Complaint   Patient presents with    Annual Wellness Visit    Diabetes     1. \"Have you been to the ER, urgent care clinic since your last visit? Hospitalized since your last visit? \" ER for Broken arm from fall on floor out of bed     2. \"Have you seen or consulted any other health care providers outside of the [address] [place] [address] since your last visit? \"  Ortho     3. For patients aged 39-70: Has the patient had a colonoscopy / FIT/ Cologuard?  No gap

## 2022-10-04 NOTE — PROGRESS NOTES
[facility]  Marium Parra 47 y.o. male  presents to the office today for AWV and prediabetes follow up    Blood pressure 120/88, pulse 98, temperature 99.2 °F (37.3 °C), temperature source Temporal, resp. rate 16, height 5' 8\" (1.727 m), weight 140 lb (63.5 kg), SpO2 96 %. Body mass index is 21.29 kg/m². Chief Complaint   Patient presents with    Annual Wellness Visit    Diabetes     Weight loss: Mr. Brandon Dominique is a [facility] patient of Dr. Maria Luisa Pickens. He has recently lost some weight. Dr. Mike Wilkinson called me and reported the patient's CEA was elevated at 5.6 (normal range is 0.0-4.7). Pt denies abdominal pain, chest pain, SOB, cough, headaches, blood in stool, or fatigue. Prediabetes: A1c per POC today was 5.1. Health Maintenance:   Pt gets flu and COVID vaccines at [facility]. Current Outpatient Medications   Medication Sig Dispense Refill    food supplemt, lactose-reduced (Boost) 0.04 gram- 1 kcal/mL liqd Take  by mouth. 3 x day      escitalopram oxalate (LEXAPRO) 20 mg tablet Take 20 mg by mouth daily. oxyCODONE-acetaminophen (PERCOCET) 5-325 mg per tablet Take  by mouth every six (6) hours as needed for Pain. Dr Connie Wall      brexpiprazole (Rexulti) 0.25 mg tab tablet Take 0.5 mg by mouth daily. cyanocobalamin 1,000 mcg tablet Take 1,000 mcg by mouth daily. bisacodyl (DULCOLAX) 10 mg suppository 10 mg.      multivitamin, tx-iron-ca-min (THERA-M W/ IRON) 9 mg iron-400 mcg tab tablet Take 1 Tab by mouth daily. QUEtiapine (SEROquel) 100 mg tablet Take 100 mg by mouth daily. eszopiclone (LUNESTA) 3 mg tablet Take  by mouth nightly. omeprazole (PRILOSEC) 40 mg capsule Take 40 mg by mouth daily. B.animalis,bifid,infantis,long 10-15 mg TbEC Take  by mouth daily. senna (SENOKOT) 8.6 mg tablet Take 1 Tab by mouth two (2) times a day.       melatonin 3 mg tablet Take 1 tablet by mouth 2 hours prior to bedtime      trospium (SANCTURA) 20 mg tablet Take  by mouth two (2) times a day. clonazePAM (KLONOPIN) 0.5 mg tablet Take 3 mg by mouth three (3) times daily. traZODone (DESYREL) 300 mg tablet Take 300 mg by mouth nightly.      ergocalciferol (ERGOCALCIFEROL) 1,250 mcg (50,000 unit) capsule Take 50,000 Units by mouth every seven (7) days. On Mondays      budesonide (RHINOCORT AQUA) 32 mcg/actuation nasal spray 2 Sprays by Both Nostrils route two (2) times a day. senna-docusate (PERICOLACE) 8.6-50 mg per tablet Take 1 Tablet by mouth two (2) times a day. (Patient not taking: Reported on 10/4/2022)      dextromethorphan-quiNIDine (NUEDEXTA) 20-10 mg per capsule Take 1 Cap by mouth. (Patient not taking: Reported on 10/4/2022)      vortioxetine (TRINTELLIX) 20 mg tablet Take  by mouth daily. (Patient not taking: Reported on 10/4/2022)      cholecalciferol (VITAMIN D3) 50,000 unit capsule Take  by mouth every seven (7) days. propranolol (INDERAL) 80 mg tablet Take 60 mg by mouth two (2) times a day.  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 8/10/2010    Mood disorder in conditions classified elsewhere 8/10/2010    Other specified transient mental disorders due to conditions classified elsewhere(293.89) 8/10/2010    Psychiatric disorder     ANXIETY    PUD (peptic ulcer disease)     HX    Quadriplegia, unspecified ([address] 8/10/2010    Skin lesion 2/10/2014    Small bowel obstruction ([facility] [address] 4/6/2015     Past Surgical History:   Procedure Laterality Date    COLONOSCOPY N/A 4/28/2017    COLONOSCOPY / EGD  performed by Sveta SAHU Tod Elizondo MD at [facility] ENDOSCOPY    HX OTHER SURGICAL  4/12/15    ANTONIO, exp lap, sm bowel resection    HX OTHER SURGICAL  4/15/15    exp lap with wash out     HX OTHER SURGICAL      MOHS    HX OTHER SURGICAL      BACLOFEN PUMP INSERTED    [place] ABDOMEN SURGERY PROC UNLISTED  2014    \"Ulcer surgery\"/Implantable Baclofen pump     Family History   Problem Relation Age of Onset    Asthma Mother     Cancer Mother         BREAST    Cancer Father         Waldenstroms macroglobulinemia     OSTEOARTHRITIS Father     Elevated Lipids Father     Hypertension Father     Asthma Brother     Anesth Problems Neg Hx      Social History     Tobacco Use    Smoking status: Never    Smokeless tobacco: Never   Substance Use Topics    Alcohol use: No     Comment: prior alcoholic        Review of Systems   Constitutional:  Positive for weight loss. Negative for chills and fever. HENT:  Negative for hearing loss and tinnitus. Eyes:  Negative for blurred vision and double vision. Respiratory:  Negative for cough and shortness of breath. Cardiovascular:  Negative for chest pain and palpitations. Gastrointestinal:  Negative for nausea and vomiting. Genitourinary:  Negative for dysuria and frequency. Musculoskeletal:  Negative for back pain and falls. Skin:  Negative for itching and rash. Neurological:  Negative for dizziness, loss of consciousness and headaches. Endo/Heme/Allergies: Negative. Psychiatric/Behavioral:  Negative for depression. The patient is not nervous/anxious. Physical Exam  Vitals reviewed. Constitutional:       Appearance: Normal appearance. HENT:      Head: Normocephalic and atraumatic. Right Ear: Tympanic membrane, ear canal and external ear normal.      Left Ear: Tympanic membrane, ear canal and external ear normal.      Nose: Nose normal.      Mouth/Throat:      Mouth: Mucous membranes are moist.      Pharynx: Oropharynx is clear.    Eyes:      Extraocular Movements: Extraocular movements intact. Conjunctiva/sclera: Conjunctivae normal.      Pupils: Pupils are equal, round, and reactive to light. Cardiovascular:      Rate and Rhythm: Normal rate and regular rhythm. Pulses: Normal pulses. Heart sounds: Normal heart sounds. Pulmonary:      Effort: Pulmonary effort is normal.      Breath sounds: Normal breath sounds. Abdominal:      General: Abdomen is flat. Bowel sounds are normal.      Palpations: Abdomen is soft. Tenderness: There is abdominal tenderness in the right upper quadrant. Musculoskeletal:         General: Normal range of motion. Cervical back: Normal range of motion and neck supple. Skin:     General: Skin is warm and dry. Neurological:      General: No focal deficit present. Mental Status: He is alert and oriented to person, place, and time. Psychiatric:         Mood and Affect: Mood normal.         Behavior: Behavior normal.         Judgment: Judgment normal.       ASSESSMENT and PLAN  Diagnoses and all orders for this visit:    1. Prediabetes  A1c per POC today was 5.1. Continue current regimen. -     AMB POC HEMOGLOBIN A1C    2. Need for hepatitis C screening test  Pt will undergo a one time hepatitis C screening during today's appointment.   -     HEPATITIS C AB, RFLX TO QT BY PCR; Future    3. Weight loss  I have ordered some labs to see what may be causing this. -     TSH 3RD GENERATION; Future  -     T4, FREE; Future  -     CT CHEST ABD PELV W CONT; Future  -     GAMMOPATHY EVAL, SPEP/CEDRIC, IG QT/FLC; Future  -     PSA W/ REFLX FREE PSA; Future    4. Malaise  I have ordered some labs to see what may be causing this. -     TSH 3RD GENERATION; Future  -     T4, FREE; Future  -     CBC WITH AUTOMATED DIFF; Future    5. Right upper quadrant pain   I have ordered a CT of the chest/abdomen for further evaluation. Also will check PSA. Once I receive the results of CT and labs I will contact Dr. Kami Salazar to discuss treatment plan.    -     CT CHEST ABD PELV W CONT; Future  -     PSA W/ REFLX FREE PSA; Future    6. Frequency of micturition   -     PSA W/ REFLX FREE PSA; Future    Follow-up and Dispositions    Return in about 4 weeks (around 11/1/2022) for weight follow up. Medication risks/benefits/costs/interactions/alternatives discussed with patient. Advised patient to call back or return to office if symptoms worsen/change/persist.  If patient cannot reach us or should anything more severe/urgent arise he/she should proceed directly to the nearest emergency department. Discussed expected course/resolution/complications of diagnosis in detail with patient. Patient given a written after visit summary which includes diagnoses, current medications and vitals. Patient expressed understanding with the diagnosis and plan. Written by stephen Florian, as dictated by Jan Camara M.D.    12:30 PM - 12:50 PM    Total time spent with the patient 20 minutes, greater than 50% of time spent counseling patient.

## 2022-10-04 NOTE — PROGRESS NOTES
This is the Subsequent Medicare Annual Wellness Exam, performed 12 months or more after the Initial AWV or the last Subsequent AWV    I have reviewed the patient's medical history in detail and updated the computerized patient record. Assessment/Plan   Education and counseling provided:  Are appropriate based on today's review and evaluation    1. Prediabetes  -     AMB POC HEMOGLOBIN A1C  2. Need for hepatitis C screening test  -     HEPATITIS C AB, RFLX TO QT BY PCR; Future  3. Weight loss       Depression Risk Factor Screening     3 most recent PHQ Screens 10/4/2022   PHQ Not Done -   Little interest or pleasure in doing things Not at all   Feeling down, depressed, irritable, or hopeless Not at all   Total Score PHQ 2 0       Alcohol & Drug Abuse Risk Screen    Do you average more than 2 drinks per night or 14 drinks a week: No    On any one occasion in the past three months have you have had more than 4 drinks containing alcohol:  No          Functional Ability and Level of Safety    Hearing: Hearing is good. Activities of Daily Living: The home contains: handrails and grab bars  Patient needs help with:  phone, transportation, shopping, preparing meals, laundry, housework, managing medications, managing money, eating, dressing, bathing, hygiene, bathroom needs, and walking      Ambulation: wheelchair bound     Fall Risk:  Fall Risk Assessment, last 12 mths 10/4/2022   Able to walk? Yes   Fall in past 12 months? 1   Do you feel unsteady? 1   Are you worried about falling 0   Is TUG test greater than 12 seconds? 0   Is the gait abnormal? 1   Number of falls in past 12 months 1   Fall with injury?  1      Abuse Screen:  Patient is not abused       Cognitive Screening    Has your family/caregiver stated any concerns about your memory: pt with history of TBI         Health Maintenance Due     Health Maintenance Due   Topic Date Due    Hepatitis C Screening  Never done    Shingrix Vaccine Age 50> (1 of 2) Never done    Medicare Yearly Exam  05/08/2020    COVID-19 Vaccine (4 - Booster for Pfizer series) 06/16/2022       Patient Care Team   Patient Care Team:  Susi Lira MD as PCP - Clarice Laird MD as PCP - [facility] Empaneled Provider    History     Patient Active Problem List   Diagnosis Code    Mood disorder in conditions classified elsewhere F06.30    Dysphagia R13.10    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 9XAS    Vitamin D deficiency E55.9    Skin lesion L98.9    Prediabetes R73.03    Advanced care planning/counseling discussion Z71.89    TBI (traumatic brain injury) S06. 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 8/10/2010    Mood disorder in conditions classified elsewhere 8/10/2010    Other specified transient mental disorders due to conditions classified elsewhere(293.89) 8/10/2010    Psychiatric disorder     ANXIETY    PUD (peptic ulcer disease)     HX    Quadriplegia, unspecified ([facility] [address] 8/10/2010    Skin lesion 2/10/2014    Small bowel obstruction ([facility] [address] 4/6/2015      Past Surgical History:   Procedure Laterality Date    COLONOSCOPY N/A 4/28/2017    COLONOSCOPY / EGD  performed by Sherryle Congo, MD at [facility] ENDOSCOPY    HX OTHER SURGICAL  4/12/15    ANTONIO, exp lap, sm bowel resection    HX OTHER SURGICAL  4/15/15    exp lap with wash out     HX OTHER SURGICAL      MOHS    HX OTHER SURGICAL      BACLOFEN PUMP INSERTED    SD ABDOMEN SURGERY PROC UNLISTED  2014    \"Ulcer surgery\"/Implantable Baclofen pump     Current Outpatient Medications   Medication Sig Dispense Refill    food supplemt, lactose-reduced (Boost) 0.04 gram- 1 kcal/mL liqd Take  by mouth. 3 x day      escitalopram oxalate (LEXAPRO) 20 mg tablet Take 20 mg by mouth daily. oxyCODONE-acetaminophen (PERCOCET) 5-325 mg per tablet Take  by mouth every six (6) hours as needed for Pain. Dr Alia Bradford      brexpiprazole (Rexulti) 0.25 mg tab tablet Take 0.5 mg by mouth daily. cyanocobalamin 1,000 mcg tablet Take 1,000 mcg by mouth daily. bisacodyl (DULCOLAX) 10 mg suppository 10 mg.      multivitamin, tx-iron-ca-min (THERA-M W/ IRON) 9 mg iron-400 mcg tab tablet Take 1 Tab by mouth daily. QUEtiapine (SEROquel) 100 mg tablet Take 100 mg by mouth daily. eszopiclone (LUNESTA) 3 mg tablet Take  by mouth nightly. omeprazole (PRILOSEC) 40 mg capsule Take 40 mg by mouth daily. B.animalis,bifid,infantis,long 10-15 mg TbEC Take  by mouth daily. senna (SENOKOT) 8.6 mg tablet Take 1 Tab by mouth two (2) times a day. melatonin 3 mg tablet Take 1 tablet by mouth 2 hours prior to bedtime      trospium (SANCTURA) 20 mg tablet Take  by mouth two (2) times a day. clonazePAM (KLONOPIN) 0.5 mg tablet Take 3 mg by mouth three (3) times daily. traZODone (DESYREL) 300 mg tablet Take 300 mg by mouth nightly.      ergocalciferol (ERGOCALCIFEROL) 1,250 mcg (50,000 unit) capsule Take 50,000 Units by mouth every seven (7) days. On Mondays      budesonide (RHINOCORT AQUA) 32 mcg/actuation nasal spray 2 Sprays by Both Nostrils route two (2) times a day. senna-docusate (PERICOLACE) 8.6-50 mg per tablet Take 1 Tablet by mouth two (2) times a day. (Patient not taking: Reported on 10/4/2022)      dextromethorphan-quiNIDine (NUEDEXTA) 20-10 mg per capsule Take 1 Cap by mouth. (Patient not taking: Reported on 10/4/2022)      vortioxetine (TRINTELLIX) 20 mg tablet Take  by mouth daily. (Patient not taking: Reported on 10/4/2022)      cholecalciferol (VITAMIN D3) 50,000 unit capsule Take  by mouth every seven (7) days.       propranolol (INDERAL) 80 mg tablet Take 60 mg by mouth two (2) times a day.  Hold if systolic  Pressure is below 100 (Patient not taking: Reported on 10/4/2022)       Allergies   Allergen Reactions    Invega [Paliperidone] Unknown (comments)       Family History   Problem Relation Age of Onset    Asthma Mother     Cancer Mother         BREAST    Cancer Father         Waldenstroms macroglobulinemia     OSTEOARTHRITIS Father     Elevated Lipids Father     Hypertension Father     Asthma Brother     Anesth Problems Neg Hx      Social History     Tobacco Use    Smoking status: Never    Smokeless tobacco: Never   Substance Use Topics    Alcohol use: No     Comment: prior alcoholic         Ethyl MD Jhon

## 2022-10-05 LAB
BASOPHILS # BLD: 0.1 K/UL (ref 0–0.1)
BASOPHILS NFR BLD: 1 % (ref 0–1)
DIFFERENTIAL METHOD BLD: NORMAL
EOSINOPHIL # BLD: 0.1 K/UL (ref 0–0.4)
EOSINOPHIL NFR BLD: 2 % (ref 0–7)
ERYTHROCYTE [DISTWIDTH] IN BLOOD BY AUTOMATED COUNT: 13.6 % (ref 11.5–14.5)
HCT VFR BLD AUTO: 44.3 % (ref 36.6–50.3)
HGB BLD-MCNC: 14.6 G/DL (ref 12.1–17)
IMM GRANULOCYTES # BLD AUTO: 0 K/UL (ref 0–0.04)
IMM GRANULOCYTES NFR BLD AUTO: 0 % (ref 0–0.5)
LYMPHOCYTES # BLD: 1.5 K/UL (ref 0.8–3.5)
LYMPHOCYTES NFR BLD: 16 % (ref 12–49)
MCH RBC QN AUTO: 30.4 PG (ref 26–34)
MCHC RBC AUTO-ENTMCNC: 33 G/DL (ref 30–36.5)
MCV RBC AUTO: 92.1 FL (ref 80–99)
MONOCYTES # BLD: 0.6 K/UL (ref 0–1)
MONOCYTES NFR BLD: 7 % (ref 5–13)
NEUTS SEG # BLD: 6.7 K/UL (ref 1.8–8)
NEUTS SEG NFR BLD: 74 % (ref 32–75)
NRBC # BLD: 0 K/UL (ref 0–0.01)
NRBC BLD-RTO: 0 PER 100 WBC
PLATELET # BLD AUTO: 275 K/UL (ref 150–400)
PMV BLD AUTO: 11 FL (ref 8.9–12.9)
RBC # BLD AUTO: 4.81 M/UL (ref 4.1–5.7)
T4 FREE SERPL-MCNC: 1 NG/DL (ref 0.8–1.5)
TSH SERPL DL<=0.05 MIU/L-ACNC: 1.11 UIU/ML (ref 0.36–3.74)
WBC # BLD AUTO: 9 K/UL (ref 4.1–11.1)

## 2022-10-06 LAB
HCV AB S/CO SERPL IA: <0.1 S/CO RATIO (ref 0–0.9)
HCV AB SERPL QL IA: NORMAL
PSA SERPL-MCNC: 0.6 NG/ML (ref 0–4)
REFLEX CRITERIA: NORMAL

## 2022-10-07 LAB
ALBUMIN SERPL ELPH-MCNC: 3.9 G/DL (ref 2.9–4.4)
ALBUMIN/GLOB SERPL: 1.7 {RATIO} (ref 0.7–1.7)
ALPHA1 GLOB SERPL ELPH-MCNC: 0.3 G/DL (ref 0–0.4)
ALPHA2 GLOB SERPL ELPH-MCNC: 0.7 G/DL (ref 0.4–1)
B-GLOBULIN SERPL ELPH-MCNC: 0.9 G/DL (ref 0.7–1.3)
GAMMA GLOB SERPL ELPH-MCNC: 0.4 G/DL (ref 0.4–1.8)
GLOBULIN SER-MCNC: 2.3 G/DL (ref 2.2–3.9)
IGA SERPL-MCNC: 219 MG/DL (ref 90–386)
IGG SERPL-MCNC: 443 MG/DL (ref 603–1613)
IGM SERPL-MCNC: 43 MG/DL (ref 20–172)
INTERPRETATION SERPL IEP-IMP: ABNORMAL
KAPPA LC FREE SER-MCNC: 20.3 MG/L (ref 3.3–19.4)
KAPPA LC FREE/LAMBDA FREE SER: 1.51 {RATIO} (ref 0.26–1.65)
LAMBDA LC FREE SERPL-MCNC: 13.4 MG/L (ref 5.7–26.3)
M PROTEIN SERPL ELPH-MCNC: ABNORMAL G/DL
PROT SERPL-MCNC: 6.2 G/DL (ref 6–8.5)

## 2022-10-27 ENCOUNTER — PATIENT OUTREACH (OUTPATIENT)
Dept: CASE MANAGEMENT | Age: 52
End: 2022-10-27

## 2022-10-27 ENCOUNTER — TELEPHONE (OUTPATIENT)
Dept: FAMILY MEDICINE CLINIC | Age: 52
End: 2022-10-27

## 2022-10-27 NOTE — TELEPHONE ENCOUNTER
Returned message advised Unfortunately I cannot order test she will need to reach back out to Central Scheduling     Good Morning,    The referenced patient was seen in office by Dr. Isaias Osborn on 10/04/2022 for concerns regarding weight loss and an elevated lab value that was concerning to Dr. Clinton Montanez. Dr. Isaias Osborn ordered a CT scan of the abd. with contrast which we had scheduled for 10/11; however, Central Scheduling called and canceled prior stating the patients [facility] Advantage plan had not pre-authorized the scan. I called scheduling and it appears not to be an Jaqueline Rode issue but something stating they didn't have the ordering providers identification information in the system. The young lady Dwayne Summers) I talked to in central scheduling stated that didn't make sense since Dr. Isaias Osborn is a [employer] physician. She was going to be looking into the issue and calling either me or your office back once she investigated further. As of today, I have not heard anything back; therefore, I was hoping you could help me with getting the patient scheduled as soon as possible. We are willing to take the first available appointment at [facility]. I appreciate your assistance and let me know what else I may be able to do to get this patient scheduled.     Thanks,  Jasmin Cao, [address] [facility]   Clinical   [place] [address] [address]  [phone] ([facility]) [phone]

## 2022-10-27 NOTE — PROGRESS NOTES
ACM contacted Central Scheduling to inquire status of CT imaging orders placed 10/4/22 by PCP. Per Prior Authorization team associate, scan has been approved. Pt  with [facility] provided with update and information to central scheduling.

## 2022-11-07 ENCOUNTER — HOSPITAL ENCOUNTER (OUTPATIENT)
Dept: CT IMAGING | Age: 52
Discharge: HOME | End: 2022-11-07
Attending: FAMILY MEDICINE
Payer: MEDICARE

## 2022-11-07 DIAGNOSIS — R63.4 WEIGHT LOSS: ICD-10-CM

## 2022-11-07 DIAGNOSIS — R10.11 RIGHT UPPER QUADRANT PAIN: ICD-10-CM

## 2022-11-07 PROCEDURE — 74177 CT ABD & PELVIS W/CONTRAST: CPT

## 2022-11-07 PROCEDURE — 74011000636 HC RX REV CODE- 636: Performed by: RADIOLOGY

## 2022-11-07 RX ADMIN — IOPAMIDOL 100 ML: 755 INJECTION, SOLUTION INTRAVENOUS at 16:52

## 2023-07-28 ENCOUNTER — HOSPITAL ENCOUNTER (EMERGENCY)
Facility: HOSPITAL | Age: 53
Discharge: HOME | End: 2023-07-28
Attending: EMERGENCY MEDICINE
Payer: MEDICARE

## 2023-07-28 ENCOUNTER — APPOINTMENT (OUTPATIENT)
Facility: HOSPITAL | Age: 53
End: 2023-07-28
Payer: MEDICARE

## 2023-07-28 VITALS
HEART RATE: 74 BPM | WEIGHT: 132.94 LBS | DIASTOLIC BLOOD PRESSURE: 69 MMHG | RESPIRATION RATE: 16 BRPM | OXYGEN SATURATION: 94 % | SYSTOLIC BLOOD PRESSURE: 95 MMHG | TEMPERATURE: 97.6 F | BODY MASS INDEX: 20.21 KG/M2

## 2023-07-28 DIAGNOSIS — R09.89 CHOKING EPISODE: Primary | ICD-10-CM

## 2023-07-28 LAB
ALBUMIN SERPL-MCNC: 3.1 G/DL (ref 3.5–5)
ALBUMIN/GLOB SERPL: 1.2 (ref 1.1–2.2)
ALP SERPL-CCNC: 100 U/L (ref 45–117)
ALT SERPL-CCNC: 16 U/L (ref 12–78)
ANION GAP SERPL CALC-SCNC: 11 MMOL/L (ref 5–15)
AST SERPL-CCNC: 12 U/L (ref 15–37)
BASOPHILS # BLD: 0 K/UL (ref 0–0.1)
BASOPHILS NFR BLD: 0 % (ref 0–1)
BILIRUB SERPL-MCNC: 0.2 MG/DL (ref 0.2–1)
BUN SERPL-MCNC: 38 MG/DL (ref 6–20)
BUN/CREAT SERPL: 48 (ref 12–20)
CALCIUM SERPL-MCNC: 8.8 MG/DL (ref 8.5–10.1)
CHLORIDE SERPL-SCNC: 107 MMOL/L (ref 97–108)
CO2 SERPL-SCNC: 25 MMOL/L (ref 21–32)
CREAT SERPL-MCNC: 0.8 MG/DL (ref 0.7–1.3)
DIFFERENTIAL METHOD BLD: ABNORMAL
EOSINOPHIL # BLD: 0.3 K/UL (ref 0–0.4)
EOSINOPHIL NFR BLD: 3 % (ref 0–7)
ERYTHROCYTE [DISTWIDTH] IN BLOOD BY AUTOMATED COUNT: 13.8 % (ref 11.5–14.5)
GLOBULIN SER CALC-MCNC: 2.6 G/DL (ref 2–4)
GLUCOSE SERPL-MCNC: 101 MG/DL (ref 65–100)
HCT VFR BLD AUTO: 40.4 % (ref 36.6–50.3)
HGB BLD-MCNC: 13.4 G/DL (ref 12.1–17)
IMM GRANULOCYTES # BLD AUTO: 0 K/UL (ref 0–0.04)
IMM GRANULOCYTES NFR BLD AUTO: 0 % (ref 0–0.5)
LACTATE SERPL-SCNC: 0.9 MMOL/L (ref 0.4–2)
LYMPHOCYTES # BLD: 1.2 K/UL (ref 0.8–3.5)
LYMPHOCYTES NFR BLD: 11 % (ref 12–49)
MCH RBC QN AUTO: 29.9 PG (ref 26–34)
MCHC RBC AUTO-ENTMCNC: 33.2 G/DL (ref 30–36.5)
MCV RBC AUTO: 90.2 FL (ref 80–99)
MONOCYTES # BLD: 0.9 K/UL (ref 0–1)
MONOCYTES NFR BLD: 8 % (ref 5–13)
NEUTS SEG # BLD: 7.9 K/UL (ref 1.8–8)
NEUTS SEG NFR BLD: 78 % (ref 32–75)
NRBC # BLD: 0 K/UL (ref 0–0.01)
NRBC BLD-RTO: 0 PER 100 WBC
PLATELET # BLD AUTO: 216 K/UL (ref 150–400)
PMV BLD AUTO: 10.8 FL (ref 8.9–12.9)
POTASSIUM SERPL-SCNC: 4.1 MMOL/L (ref 3.5–5.1)
PROT SERPL-MCNC: 5.7 G/DL (ref 6.4–8.2)
RBC # BLD AUTO: 4.48 M/UL (ref 4.1–5.7)
SODIUM SERPL-SCNC: 143 MMOL/L (ref 136–145)
WBC # BLD AUTO: 10.3 K/UL (ref 4.1–11.1)

## 2023-07-28 PROCEDURE — 71045 X-RAY EXAM CHEST 1 VIEW: CPT

## 2023-07-28 PROCEDURE — 80053 COMPREHEN METABOLIC PANEL: CPT

## 2023-07-28 PROCEDURE — 83605 ASSAY OF LACTIC ACID: CPT

## 2023-07-28 PROCEDURE — 85025 COMPLETE CBC W/AUTO DIFF WBC: CPT

## 2023-07-28 PROCEDURE — 36415 COLL VENOUS BLD VENIPUNCTURE: CPT

## 2023-07-28 PROCEDURE — 99284 EMERGENCY DEPT VISIT MOD MDM: CPT

## 2023-07-28 PROCEDURE — 2580000003 HC RX 258: Performed by: EMERGENCY MEDICINE

## 2023-07-28 RX ORDER — 0.9 % SODIUM CHLORIDE 0.9 %
1000 INTRAVENOUS SOLUTION INTRAVENOUS ONCE
Status: COMPLETED | OUTPATIENT
Start: 2023-07-28 | End: 2023-07-28

## 2023-07-28 RX ADMIN — SODIUM CHLORIDE 1000 ML: 9 INJECTION, SOLUTION INTRAVENOUS at 21:28

## 2023-07-29 NOTE — ED TRIAGE NOTES
Pt sent by [facility], [place] for a cough that started when they gave his night meds.

## 2023-07-29 NOTE — ED PROVIDER NOTES
[facility] EMERGENCY CTR  EMERGENCY DEPARTMENT ENCOUNTER      Pt Name: Zhao Salinas  MRN: [id number]  [address] [facility] 1970  Date of evaluation: 7/28/2023  Provider: Nikos Whyte MD    [address]       Chief Complaint   Patient presents with    Cough         HISTORY OF PRESENT ILLNESS   (Location/Symptom, Timing/Onset, Context/Setting, Quality, Duration, Modifying Factors, Severity)  Note limiting factors. Patient is a 69-year-old male with history of coagulation disorder, dysphagia, GERD, quadriplegia, TBI, anxiety, peptic ulcer disease, small bowel obstruction who presents with concern for choking that was witnessed earlier. Caregiver reports that he was taking his nightly meds when he appeared to choke and started coughing. Notes that right now he is back to his baseline and appears to be no distress. She says he is sleepy right now because he was given his nightly meds prior to arrival.    She reports that his systolic blood pressures normally run in the 80s. No report of fevers, chills, nausea, vomiting. No recent travel or sick contacts. The history is provided by a caregiver. Nursing Notes were reviewed.     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

## 2023-07-29 NOTE — ED NOTES
Bedside shift change report given to Aliza Pike RN (oncoming nurse) by Yaneli Melvin RN (offgoing nurse). Report included the following information Nurse Handoff Report, ED Encounter Summary, and ED SBAR.        Nadia Gao RN  07/28/23 [id number]

## 2023-07-29 NOTE — ED NOTES
[facility] at bedside picking up patient. Caregiver at bedside.       Geovani Calvert RN  07/28/23 [id number]

## 2023-07-29 NOTE — DISCHARGE INSTRUCTIONS
There is no evidence of pneumonia on chest xray today, and labs are within normal limits today. If he begins having trouble breathing, or develops fevers please return to the ER. Thank you.

## 2023-07-29 NOTE — ED NOTES
Pain assessment on discharge was   Condition Stable  Patient discharged to home  Patient education was completed  Education taught to patient caregiver  Teaching method used was handout and verbal  Understanding of teaching was good  Patient was discharged via stretcher with hospital to home service  Discharged with hospital to home  Valuables were given to caregiver at bedside remained in possession of belongings during stay.        Pema Maldonado RN  07/28/23 [id number]

## 2023-10-16 ENCOUNTER — OFFICE VISIT (OUTPATIENT)
Age: 53
End: 2023-10-16
Payer: MEDICARE

## 2023-10-16 VITALS
TEMPERATURE: 98.4 F | RESPIRATION RATE: 16 BRPM | DIASTOLIC BLOOD PRESSURE: 80 MMHG | HEIGHT: 68 IN | HEART RATE: 92 BPM | SYSTOLIC BLOOD PRESSURE: 110 MMHG | WEIGHT: 123 LBS | OXYGEN SATURATION: 94 % | BODY MASS INDEX: 18.64 KG/M2

## 2023-10-16 DIAGNOSIS — Z00.00 ENCOUNTER FOR ANNUAL WELLNESS VISIT (AWV) IN MEDICARE PATIENT: Primary | ICD-10-CM

## 2023-10-16 PROCEDURE — G0439 PPPS, SUBSEQ VISIT: HCPCS | Performed by: FAMILY MEDICINE

## 2023-10-16 SDOH — ECONOMIC STABILITY: FOOD INSECURITY: WITHIN THE PAST 12 MONTHS, THE FOOD YOU BOUGHT JUST DIDN'T LAST AND YOU DIDN'T HAVE MONEY TO GET MORE.: NEVER TRUE

## 2023-10-16 SDOH — ECONOMIC STABILITY: HOUSING INSECURITY
IN THE LAST 12 MONTHS, WAS THERE A TIME WHEN YOU DID NOT HAVE A STEADY PLACE TO SLEEP OR SLEPT IN A SHELTER (INCLUDING NOW)?: NO

## 2023-10-16 SDOH — ECONOMIC STABILITY: FOOD INSECURITY: WITHIN THE PAST 12 MONTHS, YOU WORRIED THAT YOUR FOOD WOULD RUN OUT BEFORE YOU GOT MONEY TO BUY MORE.: NEVER TRUE

## 2023-10-16 SDOH — ECONOMIC STABILITY: INCOME INSECURITY: HOW HARD IS IT FOR YOU TO PAY FOR THE VERY BASICS LIKE FOOD, HOUSING, MEDICAL CARE, AND HEATING?: NOT HARD AT ALL

## 2023-10-16 ASSESSMENT — ENCOUNTER SYMPTOMS
VOMITING: 0
BACK PAIN: 0
NAUSEA: 0
SHORTNESS OF BREATH: 0
COUGH: 0

## 2023-10-16 ASSESSMENT — PATIENT HEALTH QUESTIONNAIRE - PHQ9
1. LITTLE INTEREST OR PLEASURE IN DOING THINGS: 1
SUM OF ALL RESPONSES TO PHQ9 QUESTIONS 1 & 2: 2
SUM OF ALL RESPONSES TO PHQ QUESTIONS 1-9: 2
2. FEELING DOWN, DEPRESSED OR HOPELESS: 1
SUM OF ALL RESPONSES TO PHQ QUESTIONS 1-9: 2

## 2023-10-16 ASSESSMENT — LIFESTYLE VARIABLES
HOW MANY STANDARD DRINKS CONTAINING ALCOHOL DO YOU HAVE ON A TYPICAL DAY: PATIENT DOES NOT DRINK
HOW OFTEN DO YOU HAVE A DRINK CONTAINING ALCOHOL: NEVER

## 2023-12-30 ENCOUNTER — HOSPITAL ENCOUNTER (EMERGENCY)
Facility: HOSPITAL | Age: 53
Discharge: HOME | End: 2024-01-02
Payer: MEDICARE

## 2023-12-30 ENCOUNTER — APPOINTMENT (OUTPATIENT)
Facility: HOSPITAL | Age: 53
End: 2023-12-30
Payer: MEDICARE

## 2023-12-30 ENCOUNTER — HOSPITAL ENCOUNTER (EMERGENCY)
Facility: HOSPITAL | Age: 53
Discharge: HOME | End: 2023-12-31
Attending: STUDENT IN AN ORGANIZED HEALTH CARE EDUCATION/TRAINING PROGRAM
Payer: MEDICARE

## 2023-12-30 DIAGNOSIS — R55 SYNCOPE AND COLLAPSE: Primary | ICD-10-CM

## 2023-12-30 DIAGNOSIS — N13.9 URINARY OBSTRUCTION: ICD-10-CM

## 2023-12-30 LAB
ALBUMIN SERPL-MCNC: 3.9 G/DL (ref 3.5–5)
ALBUMIN/GLOB SERPL: 1.3 (ref 1.1–2.2)
ALP SERPL-CCNC: 112 U/L (ref 45–117)
ALT SERPL-CCNC: 37 U/L (ref 12–78)
ANION GAP SERPL CALC-SCNC: 2 MMOL/L (ref 5–15)
AST SERPL-CCNC: 18 U/L (ref 15–37)
BASOPHILS # BLD: 0 K/UL (ref 0–0.1)
BASOPHILS NFR BLD: 0 % (ref 0–1)
BILIRUB SERPL-MCNC: 0.4 MG/DL (ref 0.2–1)
BUN SERPL-MCNC: 24 MG/DL (ref 6–20)
BUN/CREAT SERPL: 28 (ref 12–20)
CALCIUM SERPL-MCNC: 8.7 MG/DL (ref 8.5–10.1)
CHLORIDE SERPL-SCNC: 109 MMOL/L (ref 97–108)
CO2 SERPL-SCNC: 27 MMOL/L (ref 21–32)
COMMENT:: NORMAL
CREAT SERPL-MCNC: 0.87 MG/DL (ref 0.7–1.3)
DIFFERENTIAL METHOD BLD: ABNORMAL
EOSINOPHIL # BLD: 0.1 K/UL (ref 0–0.4)
EOSINOPHIL NFR BLD: 1 % (ref 0–7)
ERYTHROCYTE [DISTWIDTH] IN BLOOD BY AUTOMATED COUNT: 13.7 % (ref 11.5–14.5)
FLUAV AG NPH QL IA: NEGATIVE
FLUBV AG NOSE QL IA: NEGATIVE
GLOBULIN SER CALC-MCNC: 3.1 G/DL (ref 2–4)
GLUCOSE SERPL-MCNC: 102 MG/DL (ref 65–100)
HCT VFR BLD AUTO: 48.7 % (ref 36.6–50.3)
HGB BLD-MCNC: 16 G/DL (ref 12.1–17)
IMM GRANULOCYTES # BLD AUTO: 0 K/UL (ref 0–0.04)
IMM GRANULOCYTES NFR BLD AUTO: 0 % (ref 0–0.5)
LYMPHOCYTES # BLD: 0.3 K/UL (ref 0.8–3.5)
LYMPHOCYTES NFR BLD: 2 % (ref 12–49)
MCH RBC QN AUTO: 29.3 PG (ref 26–34)
MCHC RBC AUTO-ENTMCNC: 32.9 G/DL (ref 30–36.5)
MCV RBC AUTO: 89 FL (ref 80–99)
MONOCYTES # BLD: 0.7 K/UL (ref 0–1)
MONOCYTES NFR BLD: 5 % (ref 5–13)
NEUTS SEG # BLD: 13.2 K/UL (ref 1.8–8)
NEUTS SEG NFR BLD: 92 % (ref 32–75)
NRBC # BLD: 0 K/UL (ref 0–0.01)
NRBC BLD-RTO: 0 PER 100 WBC
PLATELET # BLD AUTO: 258 K/UL (ref 150–400)
PMV BLD AUTO: 10.4 FL (ref 8.9–12.9)
POTASSIUM SERPL-SCNC: 4.4 MMOL/L (ref 3.5–5.1)
PROT SERPL-MCNC: 7 G/DL (ref 6.4–8.2)
RBC # BLD AUTO: 5.47 M/UL (ref 4.1–5.7)
RBC MORPH BLD: ABNORMAL
SARS-COV-2 RDRP RESP QL NAA+PROBE: NOT DETECTED
SODIUM SERPL-SCNC: 138 MMOL/L (ref 136–145)
SOURCE: NORMAL
SPECIMEN HOLD: NORMAL
TROPONIN I SERPL HS-MCNC: 5 NG/L (ref 0–76)
WBC # BLD AUTO: 14.3 K/UL (ref 4.1–11.1)

## 2023-12-30 PROCEDURE — 36415 COLL VENOUS BLD VENIPUNCTURE: CPT

## 2023-12-30 PROCEDURE — 96375 TX/PRO/DX INJ NEW DRUG ADDON: CPT

## 2023-12-30 PROCEDURE — 70450 CT HEAD/BRAIN W/O DYE: CPT

## 2023-12-30 PROCEDURE — 96374 THER/PROPH/DIAG INJ IV PUSH: CPT

## 2023-12-30 PROCEDURE — 71045 X-RAY EXAM CHEST 1 VIEW: CPT

## 2023-12-30 PROCEDURE — 2580000003 HC RX 258: Performed by: STUDENT IN AN ORGANIZED HEALTH CARE EDUCATION/TRAINING PROGRAM

## 2023-12-30 PROCEDURE — 99285 EMERGENCY DEPT VISIT HI MDM: CPT

## 2023-12-30 PROCEDURE — 84484 ASSAY OF TROPONIN QUANT: CPT

## 2023-12-30 PROCEDURE — 93005 ELECTROCARDIOGRAM TRACING: CPT | Performed by: STUDENT IN AN ORGANIZED HEALTH CARE EDUCATION/TRAINING PROGRAM

## 2023-12-30 PROCEDURE — 85025 COMPLETE CBC W/AUTO DIFF WBC: CPT

## 2023-12-30 PROCEDURE — 72125 CT NECK SPINE W/O DYE: CPT

## 2023-12-30 PROCEDURE — 80053 COMPREHEN METABOLIC PANEL: CPT

## 2023-12-30 PROCEDURE — 6370000000 HC RX 637 (ALT 250 FOR IP): Performed by: STUDENT IN AN ORGANIZED HEALTH CARE EDUCATION/TRAINING PROGRAM

## 2023-12-30 PROCEDURE — 87635 SARS-COV-2 COVID-19 AMP PRB: CPT

## 2023-12-30 PROCEDURE — 87804 INFLUENZA ASSAY W/OPTIC: CPT

## 2023-12-30 RX ORDER — ACETAMINOPHEN 325 MG/1
650 TABLET ORAL
Status: COMPLETED | OUTPATIENT
Start: 2023-12-30 | End: 2023-12-30

## 2023-12-30 RX ORDER — 0.9 % SODIUM CHLORIDE 0.9 %
500 INTRAVENOUS SOLUTION INTRAVENOUS ONCE
Status: COMPLETED | OUTPATIENT
Start: 2023-12-30 | End: 2023-12-31

## 2023-12-30 RX ORDER — ESZOPICLONE 3 MG/1
3 TABLET, FILM COATED ORAL NIGHTLY
COMMUNITY

## 2023-12-30 RX ADMIN — ACETAMINOPHEN 650 MG: 325 TABLET ORAL at 22:24

## 2023-12-30 RX ADMIN — SODIUM CHLORIDE 500 ML: 9 INJECTION, SOLUTION INTRAVENOUS at 23:02

## 2023-12-30 ASSESSMENT — PAIN - FUNCTIONAL ASSESSMENT: PAIN_FUNCTIONAL_ASSESSMENT: NONE - DENIES PAIN

## 2023-12-30 NOTE — ED TRIAGE NOTES
Ems reports syncopal episode on the toilet. Patient recovered after being removed from the restroom. Now at baseline. From [facility]. EMS reports normal BG/BP en route.

## 2023-12-31 VITALS
HEART RATE: 92 BPM | RESPIRATION RATE: 22 BRPM | SYSTOLIC BLOOD PRESSURE: 142 MMHG | DIASTOLIC BLOOD PRESSURE: 97 MMHG | OXYGEN SATURATION: 91 % | TEMPERATURE: 98.2 F

## 2023-12-31 LAB
APPEARANCE UR: CLEAR
BACTERIA URNS QL MICRO: NEGATIVE /HPF
BILIRUB UR QL: NEGATIVE
COLOR UR: ABNORMAL
EKG ATRIAL RATE: 86 BPM
EKG DIAGNOSIS: NORMAL
EKG P AXIS: 34 DEGREES
EKG P-R INTERVAL: 122 MS
EKG Q-T INTERVAL: 382 MS
EKG QRS DURATION: 74 MS
EKG QTC CALCULATION (BAZETT): 457 MS
EKG R AXIS: 52 DEGREES
EKG T AXIS: 40 DEGREES
EKG VENTRICULAR RATE: 86 BPM
EPITH CASTS URNS QL MICRO: ABNORMAL /LPF
GLUCOSE UR STRIP.AUTO-MCNC: NEGATIVE MG/DL
HGB UR QL STRIP: ABNORMAL
HYALINE CASTS URNS QL MICRO: ABNORMAL /LPF (ref 0–5)
KETONES UR QL STRIP.AUTO: ABNORMAL MG/DL
LEUKOCYTE ESTERASE UR QL STRIP.AUTO: NEGATIVE
NITRITE UR QL STRIP.AUTO: NEGATIVE
PH UR STRIP: 5 (ref 5–8)
PROT UR STRIP-MCNC: NEGATIVE MG/DL
RBC #/AREA URNS HPF: ABNORMAL /HPF (ref 0–5)
SP GR UR REFRACTOMETRY: 1.02 (ref 1–1.03)
URINE CULTURE IF INDICATED: ABNORMAL
UROBILINOGEN UR QL STRIP.AUTO: 0.2 EU/DL (ref 0.2–1)
WBC URNS QL MICRO: ABNORMAL /HPF (ref 0–4)

## 2023-12-31 PROCEDURE — 96374 THER/PROPH/DIAG INJ IV PUSH: CPT

## 2023-12-31 PROCEDURE — 2580000003 HC RX 258: Performed by: STUDENT IN AN ORGANIZED HEALTH CARE EDUCATION/TRAINING PROGRAM

## 2023-12-31 PROCEDURE — 6370000000 HC RX 637 (ALT 250 FOR IP): Performed by: STUDENT IN AN ORGANIZED HEALTH CARE EDUCATION/TRAINING PROGRAM

## 2023-12-31 PROCEDURE — 6370000000 HC RX 637 (ALT 250 FOR IP): Performed by: EMERGENCY MEDICINE

## 2023-12-31 PROCEDURE — 96375 TX/PRO/DX INJ NEW DRUG ADDON: CPT

## 2023-12-31 PROCEDURE — 2580000003 HC RX 258: Performed by: EMERGENCY MEDICINE

## 2023-12-31 PROCEDURE — 93010 ELECTROCARDIOGRAM REPORT: CPT | Performed by: SPECIALIST

## 2023-12-31 PROCEDURE — 6360000002 HC RX W HCPCS: Performed by: STUDENT IN AN ORGANIZED HEALTH CARE EDUCATION/TRAINING PROGRAM

## 2023-12-31 PROCEDURE — 81001 URINALYSIS AUTO W/SCOPE: CPT

## 2023-12-31 RX ORDER — 0.9 % SODIUM CHLORIDE 0.9 %
500 INTRAVENOUS SOLUTION INTRAVENOUS ONCE
Status: COMPLETED | OUTPATIENT
Start: 2023-12-31 | End: 2023-12-31

## 2023-12-31 RX ORDER — MORPHINE SULFATE 4 MG/ML
4 INJECTION, SOLUTION INTRAMUSCULAR; INTRAVENOUS
Status: COMPLETED | OUTPATIENT
Start: 2023-12-31 | End: 2023-12-31

## 2023-12-31 RX ORDER — CLONAZEPAM 1 MG/1
2 TABLET ORAL
Status: COMPLETED | OUTPATIENT
Start: 2023-12-31 | End: 2023-12-31

## 2023-12-31 RX ORDER — CLONAZEPAM 1 MG/1
0.5 TABLET ORAL
Status: COMPLETED | OUTPATIENT
Start: 2023-12-31 | End: 2023-12-31

## 2023-12-31 RX ORDER — SULFAMETHOXAZOLE AND TRIMETHOPRIM 800; 160 MG/1; MG/1
1 TABLET ORAL 2 TIMES DAILY
Qty: 14 TABLET | Refills: 0 | Status: SHIPPED | OUTPATIENT
Start: 2023-12-31 | End: 2024-01-07

## 2023-12-31 RX ORDER — LIDOCAINE HYDROCHLORIDE 20 MG/ML
JELLY TOPICAL PRN
Status: DISCONTINUED | OUTPATIENT
Start: 2023-12-31 | End: 2023-12-31 | Stop reason: HOSPADM

## 2023-12-31 RX ADMIN — MORPHINE SULFATE 4 MG: 4 INJECTION, SOLUTION INTRAMUSCULAR; INTRAVENOUS at 14:16

## 2023-12-31 RX ADMIN — WATER 1000 MG: 1 INJECTION INTRAMUSCULAR; INTRAVENOUS; SUBCUTANEOUS at 14:16

## 2023-12-31 RX ADMIN — CLONAZEPAM 0.5 MG: 1 TABLET ORAL at 07:08

## 2023-12-31 RX ADMIN — SODIUM CHLORIDE 500 ML: 9 INJECTION, SOLUTION INTRAVENOUS at 04:04

## 2023-12-31 RX ADMIN — CLONAZEPAM 2 MG: 1 TABLET ORAL at 14:33

## 2023-12-31 RX ADMIN — LIDOCAINE HYDROCHLORIDE: 20 JELLY TOPICAL at 13:31

## 2023-12-31 ASSESSMENT — PAIN DESCRIPTION - LOCATION: LOCATION: PENIS

## 2023-12-31 ASSESSMENT — PAIN SCALES - GENERAL: PAINLEVEL_OUTOF10: 7

## 2023-12-31 NOTE — ED PROVIDER NOTES
ED SIGN OUT NOTE  Care assumed at [facility] 7:10 AM EST    Patient was signed out to me by Dr. Salomon.     Patient is awaiting UA.    BP (!) 127/92   Pulse 77   Temp 98.2 °F (36.8 °C) (Oral)   Resp 26   SpO2 99%     Labs Reviewed   CBC WITH AUTO DIFFERENTIAL - Abnormal; Notable for the following components:       Result Value    WBC 14.3 (*)     Neutrophils % 92 (*)     Lymphocytes % 2 (*)     Neutrophils Absolute 13.2 (*)     Lymphocytes Absolute 0.3 (*)     All other components within normal limits   COMPREHENSIVE METABOLIC PANEL - Abnormal; Notable for the following components:    Chloride 109 (*)     Anion Gap 2 (*)     Glucose 102 (*)     BUN 24 (*)     Bun/Cre Ratio 28 (*)     All other components within normal limits   COVID-19, RAPID   RAPID INFLUENZA A/B ANTIGENS   TROPONIN   EXTRA TUBES HOLD   URINALYSIS WITH REFLEX TO CULTURE     XR CHEST PORTABLE   Final Result   No acute intrathoracic process is identified.              CT HEAD WO CONTRAST   Final Result      Examination is limited by motion artifact. Degree of motion artifact   specifically limits evaluation for the presence of acute cervical fracture.   No acute intracranial process is identified.    Please see above for additional nonemergent incidental findings.   Procedures craniectomy changes with chronic encephalomalacia most conspicuous at   the frontal lobes superiorly and anteriorly.      Spinous process fractures from C6 through T1, these are chronic in nature and   were demonstrated on CT 2022.      CT CERVICAL SPINE WO CONTRAST   Final Result      Examination is limited by motion artifact. Degree of motion artifact   specifically limits evaluation for the presence of acute cervical fracture.   No acute intracranial process is identified.    Please see above for additional nonemergent incidental findings.   Procedures craniectomy changes with chronic encephalomalacia most conspicuous at   the frontal lobes superiorly 
trop negative  CT and C spine imaging negative  Milk leukocytosis on labs, repeat temp with mild fever 100.6F, Flu, COVID, CXR and UA ordered  Pt without Vomiting or abdominal tenderness, no indication for CT abdomen at this time.   CXR negative for pna.  COVID and FLU negative  Likely dc home, pt well appearing, and hemodynamically stable, with mild fever 100.6F and leukocytosis 14, not septic appearing.     11 PM  Change of shift.  Care of patient signed over to Dr. Salomon.  Bedside handoff complete. Awaiting UA.       Amount and/or Complexity of Data Reviewed  Labs: ordered. Decision-making details documented in ED Course.  Radiology: ordered. Decision-making details documented in ED Course.  ECG/medicine tests: ordered.    Risk  OTC drugs.  Prescription drug management.                REASSESSMENT     ED Course as of 12/30/23 2258   Sat Dec 30, 2023   1952 CBC with Auto Differential(!):    WBC 14.3(!)   RBC 5.47   Hemoglobin Quant 16.0   Hematocrit 48.7   MCV 89.0   MCH 29.3   MCHC 32.9   RDW 13.7   Platelet Count 258   MPV 10.4   Nucleated Red Blood Cells 0.0   Nucleated Red Blood Cells 0.00   Neutrophils % 92(!)   Lymphocyte % 2(!)   Monocytes % 5   Eosinophils % 1   Basophils % 0   Immature Granulocytes 0   Neutrophils Absolute 13.2(!)   Lymphocytes Absolute 0.3(!)   Monocytes Absolute 0.7   Eosinophils Absolute 0.1   Basophils Absolute 0.0   Absolute Immature Granulocyte 0.0   Differential Type SMEAR SCANNED   RBC Comment NORMOCYTIC, NORMOCHROMIC [SL]   1952 CMP(!):    Sodium 138   Potassium 4.4   Chloride 109(!)   CO2 27   Anion Gap 2(!)   Glucose, Random 102(!)   BUN,BUNPL 24(!)   Creatinine 0.87   Bun/Cre Ratio 28(!)   Est, Glom Filt Rate >60   CALCIUM, SERUM, 848513 8.7   BILIRUBIN TOTAL 0.4   ALT 37   AST 18   Alk Phos 112   Total Protein 7.0   Albumin 3.9   Globulin 3.1   ALBUMIN/GLOBULIN RATIO 1.3 [SL]   1952 Troponin:    Troponin, High Sensitivity 5 [SL]   2211 XR CHEST PORTABLE [SL]   2229 Awaiting

## 2023-12-31 NOTE — ED NOTES
RN tried twice to straight cath pt and used a coude. Another RN tried to assist but catheter was unable to advance and continued to coil. MD is aware and states that we will wait for the pt to urinate. Rn placed pt on external catheter.

## 2023-12-31 NOTE — ED NOTES
Discharge instructions provided to the patient and his care giver. Pt assisted to his wheelchair then wheeled to van for transport back to [facility].

## 2023-12-31 NOTE — ED NOTES
Bladder scan performed, pt noted to have 300mL on scan. Urinary catheterization attempted with 16 fr coude and a 6fr cath. Unable to pass either catheters to collect UA. Dr. Moreno notified. Pt repositioned in bed.

## 2023-12-31 NOTE — ED NOTES
Care assumed from Dr. Fine, 53-year-old male with a past medical history of quadriplegia, presenting with complaints of syncopal episode on the toilet without fall or injury.  Per caregiver at bedside patient continued to mentate at baseline, however he was noted to be febrile, mild leukocytosis, negative flu and COVID swabs, urine pending.    SIGN OUT:  7:00 AM  Discussed pt's hx, disposition, and available diagnostic and imaging results with Dr. Moreno. Reviewed care plans. Both providers and patient are in agreement with care plan. Umm Olvera is transferring care of the pt to Dr. Moreno at this time.        Joshua Salomon MD  12/31/23 [id number]

## 2023-12-31 NOTE — CONSULTS
New Urology Consult Note    Patient: Jose Angeles MRN: [id number]  SSN: [id number]    YOB: 1970  Age: 53 y.o.  Sex: male            Assessment:Plan:     1.  Urinary retention secondary to stricture disease.  Recommend leave the Guevara 1 week.  Will call to set up follow-up appointment.  Urine obtained and sent for culture.  Recommend at least giving some empiric antibiotics due to difficult catheterization.  Discussed with ER doc.  Will follow along with you should he remain in the hospital.    Thank you for this consult. Please contact [facility] with any further questions/concerns.    History of Present Illness:     Chief Complaint: Inability to place Guevara    Jose Angeles is seen in consultation for reasons noted above at the request of Cm Moreno MD. Admitted to hospital for <principal problem not specified>    This is a 53 y.o. male with a history of inability to urinate.  He is quadriplegic.  He came in after syncopal episode to the emergency room.  He was unable to void and was retaining some urine.  He usually voids by Crede maneuver.  ER staff wishes to get a urine analysis to make sure that is not a cause of his syncope.  Multiple attempts to place a Guevara were unsuccessful.  I been asked to try to place a Guevara.    We have seen him in the remote past for urodynamic studies.    Patient was prepped and draped.  Lidocaine jelly instilled within the urethra I attempted with straight 16 Slovak catheter was unsuccessful.  I also tried with a 14 Slovak coudé cath and was unsuccessful.    I then got the Bard difficult catheter tray and attempted to pass filiforms.  This was unsuccessful as well.    We obtained a flexible cystoscope from the operating room.  He had remained prepped and draped.  The flexible cystoscope was advanced in the urethra.  There was some blood where the catheter was lodged up in his urethra was just tight but no discrete stricture.  I

## 2024-10-08 ENCOUNTER — TELEPHONE (OUTPATIENT)
Age: 54
End: 2024-10-08

## 2024-10-08 NOTE — TELEPHONE ENCOUNTER
COLEMAN Trevino ([facility]) calling concerning consult appointment. Was informed get referral faxed over b/c patient was not seen since 2017.

## 2024-11-01 ENCOUNTER — OFFICE VISIT (OUTPATIENT)
Age: 54
End: 2024-11-01
Payer: MEDICARE

## 2024-11-01 VITALS
BODY MASS INDEX: 18.61 KG/M2 | WEIGHT: 122.4 LBS | RESPIRATION RATE: 20 BRPM | DIASTOLIC BLOOD PRESSURE: 84 MMHG | SYSTOLIC BLOOD PRESSURE: 113 MMHG | OXYGEN SATURATION: 95 % | TEMPERATURE: 98.4 F | HEART RATE: 65 BPM

## 2024-11-01 DIAGNOSIS — D50.9 IRON DEFICIENCY ANEMIA, UNSPECIFIED IRON DEFICIENCY ANEMIA TYPE: ICD-10-CM

## 2024-11-01 DIAGNOSIS — S06.9XAD TRAUMATIC BRAIN INJURY, WITH UNKNOWN LOSS OF CONSCIOUSNESS STATUS, SUBSEQUENT ENCOUNTER: ICD-10-CM

## 2024-11-01 DIAGNOSIS — D50.9 IRON DEFICIENCY ANEMIA, UNSPECIFIED IRON DEFICIENCY ANEMIA TYPE: Primary | ICD-10-CM

## 2024-11-01 DIAGNOSIS — D50.0 IRON DEFICIENCY ANEMIA DUE TO CHRONIC BLOOD LOSS: ICD-10-CM

## 2024-11-01 PROCEDURE — 99204 OFFICE O/P NEW MOD 45 MIN: CPT | Performed by: INTERNAL MEDICINE

## 2024-11-01 RX ORDER — EPINEPHRINE 1 MG/ML
0.3 INJECTION, SOLUTION, CONCENTRATE INTRAVENOUS PRN
OUTPATIENT
Start: 2024-11-08

## 2024-11-01 RX ORDER — QUETIAPINE 200 MG/1
200 TABLET, FILM COATED, EXTENDED RELEASE ORAL EVERY MORNING
COMMUNITY
Start: 2024-10-01

## 2024-11-01 RX ORDER — SODIUM CHLORIDE 0.9 % (FLUSH) 0.9 %
5-40 SYRINGE (ML) INJECTION PRN
OUTPATIENT
Start: 2024-11-08

## 2024-11-01 RX ORDER — SODIUM CHLORIDE 9 MG/ML
INJECTION, SOLUTION INTRAVENOUS CONTINUOUS
OUTPATIENT
Start: 2024-11-08

## 2024-11-01 RX ORDER — TAMSULOSIN HYDROCHLORIDE 0.4 MG/1
0.4 CAPSULE ORAL EVERY MORNING
COMMUNITY
Start: 2024-08-30

## 2024-11-01 RX ORDER — ACETAMINOPHEN 325 MG/1
650 TABLET ORAL
OUTPATIENT
Start: 2024-11-08

## 2024-11-01 RX ORDER — ONDANSETRON 2 MG/ML
8 INJECTION INTRAMUSCULAR; INTRAVENOUS
OUTPATIENT
Start: 2024-11-08

## 2024-11-01 RX ORDER — SODIUM CHLORIDE 9 MG/ML
5-250 INJECTION, SOLUTION INTRAVENOUS PRN
OUTPATIENT
Start: 2024-11-08

## 2024-11-01 RX ORDER — LORAZEPAM 2 MG/1
2 TABLET ORAL ONCE
COMMUNITY

## 2024-11-01 RX ORDER — DIPHENHYDRAMINE HYDROCHLORIDE 50 MG/ML
50 INJECTION INTRAMUSCULAR; INTRAVENOUS
OUTPATIENT
Start: 2024-11-08

## 2024-11-01 RX ORDER — ALBUTEROL SULFATE 90 UG/1
4 INHALANT RESPIRATORY (INHALATION) PRN
OUTPATIENT
Start: 2024-11-08

## 2024-11-01 RX ORDER — TRAZODONE HYDROCHLORIDE 100 MG/1
TABLET ORAL
COMMUNITY
Start: 2024-09-09

## 2024-11-01 RX ORDER — HEPARIN 100 UNIT/ML
500 SYRINGE INTRAVENOUS PRN
OUTPATIENT
Start: 2024-11-08

## 2024-11-01 ASSESSMENT — PATIENT HEALTH QUESTIONNAIRE - PHQ9: DEPRESSION UNABLE TO ASSESS: FUNCTIONAL CAPACITY MOTIVATION LIMITS ACCURACY

## 2024-11-01 NOTE — PROGRESS NOTES
Jose Angeles is a 54 y.o. male    Chief Complaint   Patient presents with    New Patient     evaluation of iron deficiency anemia.       1. Have you been to the ER, urgent care clinic since your last visit?  Hospitalized since your last visit?No    2. Have you seen or consulted any other health care providers outside of the [facility] since your last visit?  Include any pap smears or colon screening. Yes, [facility] and other specialists unknown.  [facility], [facility], [facility], [facility], [facility] [place], [facility] through [facility].    Unable to complete screening questions.    
nontender, nondistended, no masses  Skin: no rashes, no ecchymoses, no petechiae  Neuro: paraplegia    Results:     Lab Results   Component Value Date/Time    WBC 14.3 12/30/2023 06:22 PM    HGB 16.0 12/30/2023 06:22 PM    HCT 48.7 12/30/2023 06:22 PM     12/30/2023 06:22 PM    MCV 89.0 12/30/2023 06:22 PM     Lab Results   Component Value Date/Time     12/30/2023 06:22 PM    K 4.4 12/30/2023 06:22 PM     12/30/2023 06:22 PM    CO2 27 12/30/2023 06:22 PM    BUN 24 12/30/2023 06:22 PM    GFRAA >60 08/15/2021 04:18 PM     Lab Results   Component Value Date/Time    ALT 37 12/30/2023 06:22 PM    GLOB 3.1 12/30/2023 06:22 PM     Records from [facility] reviewed and summarized above.  Test results above have been reviewed.    Assessment:   #) Iron deficiency anemia, recurrent  Previously required IV iron infusions (injectafer) in 2017. EGD/colonoscopy 2017 negative for malignancy.   Now with recurrence in iron deficiency anemia.   Patient is currently on oral iron.   Iron deficit ~700 mg. Given iron refractory iron deficiency anemia, recommend proceed with IV iron. Risks/benefits discussed.    #) History of TBI and quadraplegia  - Resides at [facility]    Plan:     CBC with diff, ferritin, iron profile  Plan for IV Venofer 200 mg x 3 doses  Consider referral to GI for EGD/colonoscopy  Return to clinic in 3 months or sooner prn. Repeat labs completed prior to visit    Signed By: Miranda Wills MD

## 2024-11-02 LAB
BASOPHILS # BLD AUTO: 0.1 X10E3/UL (ref 0–0.2)
BASOPHILS NFR BLD AUTO: 1 %
EOSINOPHIL # BLD AUTO: 0.1 X10E3/UL (ref 0–0.4)
EOSINOPHIL NFR BLD AUTO: 3 %
ERYTHROCYTE [DISTWIDTH] IN BLOOD BY AUTOMATED COUNT: 14.6 % (ref 11.6–15.4)
FERRITIN SERPL-MCNC: 12 NG/ML (ref 30–400)
HCT VFR BLD AUTO: 38.1 % (ref 37.5–51)
HGB BLD-MCNC: 11.8 G/DL (ref 13–17.7)
IMM GRANULOCYTES # BLD AUTO: 0 X10E3/UL (ref 0–0.1)
IMM GRANULOCYTES NFR BLD AUTO: 0 %
IRON SATN MFR SERPL: 10 % (ref 15–55)
IRON SERPL-MCNC: 39 UG/DL (ref 38–169)
LYMPHOCYTES # BLD AUTO: 1.1 X10E3/UL (ref 0.7–3.1)
LYMPHOCYTES NFR BLD AUTO: 20 %
MCH RBC QN AUTO: 26.2 PG (ref 26.6–33)
MCHC RBC AUTO-ENTMCNC: 31 G/DL (ref 31.5–35.7)
MCV RBC AUTO: 85 FL (ref 79–97)
MONOCYTES # BLD AUTO: 0.3 X10E3/UL (ref 0.1–0.9)
MONOCYTES NFR BLD AUTO: 6 %
NEUTROPHILS # BLD AUTO: 3.8 X10E3/UL (ref 1.4–7)
NEUTROPHILS NFR BLD AUTO: 70 %
PLATELET # BLD AUTO: 254 X10E3/UL (ref 150–450)
RBC # BLD AUTO: 4.51 X10E6/UL (ref 4.14–5.8)
TIBC SERPL-MCNC: 374 UG/DL (ref 250–450)
UIBC SERPL-MCNC: 335 UG/DL (ref 111–343)
WBC # BLD AUTO: 5.4 X10E3/UL (ref 3.4–10.8)

## 2024-11-04 ENCOUNTER — TELEPHONE (OUTPATIENT)
Age: 54
End: 2024-11-04

## 2024-11-04 NOTE — TELEPHONE ENCOUNTER
Called [facility] and spoke with Mary Pts . Verified Pt ID x2. Relayed below message. Mary stated she will inform Pts parents and is aware schedulers will be giving her a call to set up IV iron appts. Mary stated they will provide transportation.     *Please schedule Pts Venofer 200mg x4 doses.       ----- Message from Dr. Miranda Wills MD sent at 11/4/2024  8:40 AM EST -----  Please call patient/guardian at [facility] facility and let them know that his labs confirm iron deficiency anemia. Recommend we proced with IV iron as discussed in clinic. He will need Venofer 200 mg x 4 weekly doses (instead of only 3).

## 2024-11-05 ENCOUNTER — TELEPHONE (OUTPATIENT)
Age: 54
End: 2024-11-05

## 2024-11-05 NOTE — TELEPHONE ENCOUNTER
left vm on [facility] rep voicemail of sched appt for 11/11 for now gave cb information to confirm / discuss schedule

## 2024-11-06 ENCOUNTER — TELEPHONE (OUTPATIENT)
Age: 54
End: 2024-11-06

## 2024-11-06 NOTE — TELEPHONE ENCOUNTER
second attemp to reach cole at [employer] phone line was busy left vm on self identified vm yesterday of appt details

## 2024-11-11 ENCOUNTER — HOSPITAL ENCOUNTER (OUTPATIENT)
Facility: HOSPITAL | Age: 54
Setting detail: INFUSION SERIES
Discharge: HOME | End: 2024-11-11
Payer: MEDICARE

## 2024-11-11 VITALS
DIASTOLIC BLOOD PRESSURE: 69 MMHG | RESPIRATION RATE: 18 BRPM | SYSTOLIC BLOOD PRESSURE: 111 MMHG | TEMPERATURE: 97 F | OXYGEN SATURATION: 94 % | HEART RATE: 88 BPM

## 2024-11-11 DIAGNOSIS — D50.0 IRON DEFICIENCY ANEMIA DUE TO CHRONIC BLOOD LOSS: Primary | ICD-10-CM

## 2024-11-11 PROCEDURE — 6360000002 HC RX W HCPCS: Performed by: NURSE PRACTITIONER

## 2024-11-11 PROCEDURE — 96374 THER/PROPH/DIAG INJ IV PUSH: CPT

## 2024-11-11 PROCEDURE — 2580000003 HC RX 258

## 2024-11-11 RX ORDER — ACETAMINOPHEN 325 MG/1
650 TABLET ORAL
OUTPATIENT
Start: 2024-11-14

## 2024-11-11 RX ORDER — HYDROCORTISONE SODIUM SUCCINATE 100 MG/2ML
100 INJECTION INTRAMUSCULAR; INTRAVENOUS
OUTPATIENT
Start: 2024-11-14

## 2024-11-11 RX ORDER — SODIUM CHLORIDE 9 MG/ML
INJECTION, SOLUTION INTRAVENOUS CONTINUOUS
OUTPATIENT
Start: 2024-11-14

## 2024-11-11 RX ORDER — EPINEPHRINE 1 MG/ML
0.3 INJECTION, SOLUTION INTRAMUSCULAR; SUBCUTANEOUS PRN
OUTPATIENT
Start: 2024-11-14

## 2024-11-11 RX ORDER — ALBUTEROL SULFATE 90 UG/1
4 INHALANT RESPIRATORY (INHALATION) PRN
OUTPATIENT
Start: 2024-11-14

## 2024-11-11 RX ORDER — SODIUM CHLORIDE 9 MG/ML
5-250 INJECTION, SOLUTION INTRAVENOUS PRN
OUTPATIENT
Start: 2024-11-14

## 2024-11-11 RX ORDER — ONDANSETRON 2 MG/ML
8 INJECTION INTRAMUSCULAR; INTRAVENOUS
OUTPATIENT
Start: 2024-11-14

## 2024-11-11 RX ORDER — SODIUM CHLORIDE 0.9 % (FLUSH) 0.9 %
5-40 SYRINGE (ML) INJECTION PRN
Status: DISCONTINUED | OUTPATIENT
Start: 2024-11-11 | End: 2024-11-12 | Stop reason: HOSPADM

## 2024-11-11 RX ORDER — SODIUM CHLORIDE 9 MG/ML
5-250 INJECTION, SOLUTION INTRAVENOUS PRN
Status: CANCELLED | OUTPATIENT
Start: 2024-11-14

## 2024-11-11 RX ORDER — DIPHENHYDRAMINE HYDROCHLORIDE 50 MG/ML
50 INJECTION INTRAMUSCULAR; INTRAVENOUS
OUTPATIENT
Start: 2024-11-14

## 2024-11-11 RX ORDER — SODIUM CHLORIDE 0.9 % (FLUSH) 0.9 %
5-40 SYRINGE (ML) INJECTION PRN
Status: CANCELLED | OUTPATIENT
Start: 2024-11-14

## 2024-11-11 RX ORDER — HEPARIN 100 UNIT/ML
500 SYRINGE INTRAVENOUS PRN
OUTPATIENT
Start: 2024-11-14

## 2024-11-11 RX ADMIN — IRON SUCROSE 200 MG: 20 INJECTION, SOLUTION INTRAVENOUS at 14:26

## 2024-11-11 RX ADMIN — SODIUM CHLORIDE, PRESERVATIVE FREE 10 ML: 5 INJECTION INTRAVENOUS at 14:31

## 2024-11-11 RX ADMIN — SODIUM CHLORIDE, PRESERVATIVE FREE 10 ML: 5 INJECTION INTRAVENOUS at 14:25

## 2024-11-11 ASSESSMENT — PAIN SCALES - GENERAL: PAINLEVEL_OUTOF10: 0

## 2024-11-11 NOTE — PROGRESS NOTES
[facility] Peds/Adult Note                       Date: 2024    Name: Jose Angeles    MRN: [id number]         : 1970    1415 Patient arrives for Venofer Dose 1 of 4  without acute problems. Please see EPIC for complete assessment and education provided.    Vital signs stable throughout and prior to discharge. Patient tolerated procedure well and was discharged without incident.  Patient's caretaker  is aware of next [facility] appointment on 24.  Appointment card give to them.        Mr. Angeles's vitals were reviewed prior to and after treatment.   Patient Vitals for the past 12 hrs:   Temp Pulse Resp BP SpO2   24 1435 -- 88 18 111/69 --   24 1415 97 °F (36.1 °C) 92 18 105/78 94 %       Medications given:   Medications Administered         iron sucrose (VENOFER) injection 200 mg Admin Date  2024 Action  Given Dose  200 mg Route  IntraVENous Documented By  Karol Combs, RN        sodium chloride flush 0.9 % injection 5-40 mL Admin Date  2024 Action  Given Dose  10 mL Route  IntraVENous Documented By  Karol Combs, RN        sodium chloride flush 0.9 % injection 5-40 mL Admin Date  2024 Action  Given Dose  10 mL Route  IntraVENous Documented By  Karol Combs, YURI              Mr. Angeles tolerated the treatment and had no complaints.    [facility]. Discharge Instructions provided to patient, patient verbalized understanding.     Future Appointments   Date Time Provider Department Center   2024  2:00 PM PEDS MED INF CHAIR 4 [facility]   2024 11:00 AM PEDS MED INF CHAIR 2 [facility]   12/3/2024 11:00 AM PEDS MED INF CHAIR 2 [facility]   2025 11:00 AM [facility]   6/10/2025 11:00 AM Rito Hook MD PAFP [facility] DEP       Karol Combs RN  2024  2:59 PM

## 2024-11-12 ENCOUNTER — TELEPHONE (OUTPATIENT)
Age: 54
End: 2024-11-12

## 2024-11-19 ENCOUNTER — HOSPITAL ENCOUNTER (OUTPATIENT)
Facility: HOSPITAL | Age: 54
Setting detail: INFUSION SERIES
Discharge: HOME | End: 2024-11-19
Payer: MEDICARE

## 2024-11-19 VITALS
SYSTOLIC BLOOD PRESSURE: 106 MMHG | HEART RATE: 77 BPM | DIASTOLIC BLOOD PRESSURE: 77 MMHG | TEMPERATURE: 98.1 F | OXYGEN SATURATION: 93 % | RESPIRATION RATE: 18 BRPM

## 2024-11-19 DIAGNOSIS — D50.0 IRON DEFICIENCY ANEMIA DUE TO CHRONIC BLOOD LOSS: Primary | ICD-10-CM

## 2024-11-19 PROCEDURE — 96374 THER/PROPH/DIAG INJ IV PUSH: CPT

## 2024-11-19 PROCEDURE — 2580000003 HC RX 258

## 2024-11-19 PROCEDURE — 6360000002 HC RX W HCPCS

## 2024-11-19 RX ORDER — DIPHENHYDRAMINE HYDROCHLORIDE 50 MG/ML
50 INJECTION INTRAMUSCULAR; INTRAVENOUS
OUTPATIENT
Start: 2024-11-22

## 2024-11-19 RX ORDER — ALBUTEROL SULFATE 90 UG/1
4 INHALANT RESPIRATORY (INHALATION) PRN
OUTPATIENT
Start: 2024-11-22

## 2024-11-19 RX ORDER — SODIUM CHLORIDE 0.9 % (FLUSH) 0.9 %
5-40 SYRINGE (ML) INJECTION PRN
OUTPATIENT
Start: 2024-11-22

## 2024-11-19 RX ORDER — SODIUM CHLORIDE 0.9 % (FLUSH) 0.9 %
5-40 SYRINGE (ML) INJECTION PRN
Status: DISCONTINUED | OUTPATIENT
Start: 2024-11-19 | End: 2024-11-20 | Stop reason: HOSPADM

## 2024-11-19 RX ORDER — ACETAMINOPHEN 325 MG/1
650 TABLET ORAL
OUTPATIENT
Start: 2024-11-22

## 2024-11-19 RX ORDER — ONDANSETRON 2 MG/ML
8 INJECTION INTRAMUSCULAR; INTRAVENOUS
OUTPATIENT
Start: 2024-11-22

## 2024-11-19 RX ORDER — HYDROCORTISONE SODIUM SUCCINATE 100 MG/2ML
100 INJECTION INTRAMUSCULAR; INTRAVENOUS
OUTPATIENT
Start: 2024-11-22

## 2024-11-19 RX ORDER — SODIUM CHLORIDE 9 MG/ML
5-250 INJECTION, SOLUTION INTRAVENOUS PRN
OUTPATIENT
Start: 2024-11-22

## 2024-11-19 RX ORDER — SODIUM CHLORIDE 9 MG/ML
INJECTION, SOLUTION INTRAVENOUS CONTINUOUS
OUTPATIENT
Start: 2024-11-22

## 2024-11-19 RX ORDER — EPINEPHRINE 1 MG/ML
0.3 INJECTION, SOLUTION INTRAMUSCULAR; SUBCUTANEOUS PRN
OUTPATIENT
Start: 2024-11-22

## 2024-11-19 RX ORDER — SODIUM CHLORIDE 9 MG/ML
5-250 INJECTION, SOLUTION INTRAVENOUS PRN
Status: DISCONTINUED | OUTPATIENT
Start: 2024-11-19 | End: 2024-11-20 | Stop reason: HOSPADM

## 2024-11-19 RX ORDER — HEPARIN 100 UNIT/ML
500 SYRINGE INTRAVENOUS PRN
OUTPATIENT
Start: 2024-11-22

## 2024-11-19 RX ADMIN — SODIUM CHLORIDE, PRESERVATIVE FREE 10 ML: 5 INJECTION INTRAVENOUS at 14:23

## 2024-11-19 RX ADMIN — SODIUM CHLORIDE, PRESERVATIVE FREE 10 ML: 5 INJECTION INTRAVENOUS at 14:30

## 2024-11-19 RX ADMIN — IRON SUCROSE 200 MG: 20 INJECTION, SOLUTION INTRAVENOUS at 14:24

## 2024-11-19 ASSESSMENT — PAIN SCALES - GENERAL: PAINLEVEL_OUTOF10: 0

## 2024-11-19 NOTE — PROGRESS NOTES
[facility] Peds/Adult [facility]                       Date: 2024    Name: Jose Angeles    MRN: [id number]         : 1970    1415 Patient arrives for Venofer Dose 2 of 4 without acute problems. Please see EPIC for complete assessment and education provided.    Vital signs stable throughout and prior to discharge. Patient tolerated procedure well and was discharged without incident.  Patients Caregiver is aware of next [facility] appointment on 24.  Appointment card give to the caregiver.      Mr. Angeles's vitals were reviewed prior to and after treatment.   Patient Vitals for the past 12 hrs:   Temp Pulse Resp BP SpO2   24 1430 -- 77 18 106/77 --   24 1415 98.1 °F (36.7 °C) 80 18 114/84 93 %       Medications given:   Medications Administered         iron sucrose (VENOFER) injection 200 mg Admin Date  2024 Action  Given Dose  200 mg Route  IntraVENous Documented By  Karol Combs, RN        sodium chloride flush 0.9 % injection 5-40 mL Admin Date  2024 Action  Given Dose  10 mL Route  IntraVENous Documented By  Karol Combs RN        sodium chloride flush 0.9 % injection 5-40 mL Admin Date  2024 Action  Given Dose  10 mL Route  IntraVENous Documented By  Karol Combs, YURI              Mr. Angeles tolerated the treatment and had no complaints.    [facility]. Discharge Instructions provided to patient, patient verbalized understanding.     Future Appointments   Date Time Provider Department Center   2024 11:00 AM PEDS MED INF CHAIR 2 [facility]   12/3/2024 11:00 AM PEDS MED INF CHAIR 2 [facility]   2025 11:00 AM Amanda Swanson APRN - CNP [facility] BS [facility]   6/10/2025 11:00 AM [facility]       Karol Combs RN  2024  2:42 PM

## 2024-11-26 ENCOUNTER — HOSPITAL ENCOUNTER (OUTPATIENT)
Facility: HOSPITAL | Age: 54
Setting detail: INFUSION SERIES
Discharge: HOME | End: 2024-11-26
Payer: MEDICARE

## 2024-11-26 VITALS
TEMPERATURE: 98 F | RESPIRATION RATE: 20 BRPM | HEART RATE: 71 BPM | OXYGEN SATURATION: 93 % | SYSTOLIC BLOOD PRESSURE: 108 MMHG | DIASTOLIC BLOOD PRESSURE: 72 MMHG

## 2024-11-26 DIAGNOSIS — D50.0 IRON DEFICIENCY ANEMIA DUE TO CHRONIC BLOOD LOSS: Primary | ICD-10-CM

## 2024-11-26 PROCEDURE — 6360000002 HC RX W HCPCS

## 2024-11-26 PROCEDURE — 96374 THER/PROPH/DIAG INJ IV PUSH: CPT

## 2024-11-26 RX ORDER — DIPHENHYDRAMINE HYDROCHLORIDE 50 MG/ML
50 INJECTION INTRAMUSCULAR; INTRAVENOUS
OUTPATIENT
Start: 2024-11-28

## 2024-11-26 RX ORDER — SODIUM CHLORIDE 9 MG/ML
5-250 INJECTION, SOLUTION INTRAVENOUS PRN
Status: DISCONTINUED | OUTPATIENT
Start: 2024-11-26 | End: 2024-11-27 | Stop reason: HOSPADM

## 2024-11-26 RX ORDER — HYDROCORTISONE SODIUM SUCCINATE 100 MG/2ML
100 INJECTION INTRAMUSCULAR; INTRAVENOUS
OUTPATIENT
Start: 2024-11-28

## 2024-11-26 RX ORDER — EPINEPHRINE 1 MG/ML
0.3 INJECTION, SOLUTION INTRAMUSCULAR; SUBCUTANEOUS PRN
OUTPATIENT
Start: 2024-11-28

## 2024-11-26 RX ORDER — SODIUM CHLORIDE 0.9 % (FLUSH) 0.9 %
5-40 SYRINGE (ML) INJECTION PRN
Status: CANCELLED | OUTPATIENT
Start: 2024-11-28

## 2024-11-26 RX ORDER — SODIUM CHLORIDE 9 MG/ML
INJECTION, SOLUTION INTRAVENOUS CONTINUOUS
OUTPATIENT
Start: 2024-11-28

## 2024-11-26 RX ORDER — HEPARIN 100 UNIT/ML
500 SYRINGE INTRAVENOUS PRN
OUTPATIENT
Start: 2024-11-28

## 2024-11-26 RX ORDER — ACETAMINOPHEN 325 MG/1
650 TABLET ORAL
OUTPATIENT
Start: 2024-11-28

## 2024-11-26 RX ORDER — SODIUM CHLORIDE 9 MG/ML
5-250 INJECTION, SOLUTION INTRAVENOUS PRN
Status: CANCELLED | OUTPATIENT
Start: 2024-11-28

## 2024-11-26 RX ORDER — SODIUM CHLORIDE 9 MG/ML
5-250 INJECTION, SOLUTION INTRAVENOUS PRN
OUTPATIENT
Start: 2024-11-28

## 2024-11-26 RX ORDER — ONDANSETRON 2 MG/ML
8 INJECTION INTRAMUSCULAR; INTRAVENOUS
OUTPATIENT
Start: 2024-11-28

## 2024-11-26 RX ORDER — ALBUTEROL SULFATE 90 UG/1
4 INHALANT RESPIRATORY (INHALATION) PRN
OUTPATIENT
Start: 2024-11-28

## 2024-11-26 RX ADMIN — IRON SUCROSE 200 MG: 20 INJECTION, SOLUTION INTRAVENOUS at 11:29

## 2024-11-26 ASSESSMENT — PAIN SCALES - GENERAL: PAINLEVEL_OUTOF10: 0

## 2024-11-26 NOTE — PROGRESS NOTES
[facility] Peds/Adult Note                       Date: 2024    Name: Jose Angeles    MRN: [id number]         : 1970    1115 Patient arrives for venofer with personal nurse without acute problems. Please see Epic for complete assessment and education provided.        Vital signs stable throughout and prior to discharge. Patient tolerated procedure well and was discharged without incident.  Jose and the nurse are aware of next [facility] appointment on 12/3/24. Appointment card given.      Mr. Angeles's vitals were reviewed prior to and after treatment.   Patient Vitals for the past 12 hrs:   Temp Pulse Resp BP SpO2   24 1140 -- 71 20 108/72 --   24 1115 98 °F (36.7 °C) 79 20 107/71 93 %         Lab results were obtained and reviewed.  No results found for this or any previous visit (from the past 12 hour(s)).    Medications given: piv left hand  Medications Administered         iron sucrose (VENOFER) injection 200 mg Admin Date  2024 Action  Given Dose  200 mg Route  IntraVENous Documented By  Leanna Albert, RN          Patient and nurse declined 30 min post infusion observation    Mr. Angeles tolerated the infusion, and had no complaints.    [facility] condition.     Future Appointments   Date Time Provider Department Center   12/3/2024 11:00 [facility]   2025 11:00 AM Amanda Swanson APRN - CNP [facility]   6/10/2025 11:00 AM Rito Hook MD PAFP [facility]       Leanna Albert RN  2024  11:57 AM

## 2024-11-26 NOTE — PLAN OF CARE
Patient complete infusion safely and without difficulty.  Problem: Pain  Goal: Verbalizes/displays adequate comfort level or baseline comfort level  Outcome: Progressing     Problem: Safety - Adult  Goal: Free from fall injury  Outcome: Progressing

## 2024-12-03 ENCOUNTER — HOSPITAL ENCOUNTER (OUTPATIENT)
Facility: HOSPITAL | Age: 54
Setting detail: INFUSION SERIES
Discharge: HOME | End: 2024-12-03
Payer: MEDICARE

## 2024-12-03 VITALS
OXYGEN SATURATION: 96 % | RESPIRATION RATE: 18 BRPM | DIASTOLIC BLOOD PRESSURE: 68 MMHG | HEART RATE: 72 BPM | SYSTOLIC BLOOD PRESSURE: 110 MMHG | TEMPERATURE: 97.9 F

## 2024-12-03 DIAGNOSIS — D50.0 IRON DEFICIENCY ANEMIA DUE TO CHRONIC BLOOD LOSS: Primary | ICD-10-CM

## 2024-12-03 PROCEDURE — 96374 THER/PROPH/DIAG INJ IV PUSH: CPT

## 2024-12-03 PROCEDURE — 6360000002 HC RX W HCPCS

## 2024-12-03 PROCEDURE — 2580000003 HC RX 258

## 2024-12-03 RX ORDER — SODIUM CHLORIDE 9 MG/ML
5-250 INJECTION, SOLUTION INTRAVENOUS PRN
OUTPATIENT
Start: 2024-12-03

## 2024-12-03 RX ORDER — ACETAMINOPHEN 325 MG/1
650 TABLET ORAL
OUTPATIENT
Start: 2024-12-03

## 2024-12-03 RX ORDER — ONDANSETRON 2 MG/ML
8 INJECTION INTRAMUSCULAR; INTRAVENOUS
OUTPATIENT
Start: 2024-12-03

## 2024-12-03 RX ORDER — DIPHENHYDRAMINE HYDROCHLORIDE 50 MG/ML
50 INJECTION INTRAMUSCULAR; INTRAVENOUS
OUTPATIENT
Start: 2024-12-03

## 2024-12-03 RX ORDER — SODIUM CHLORIDE 0.9 % (FLUSH) 0.9 %
5-40 SYRINGE (ML) INJECTION PRN
OUTPATIENT
Start: 2024-12-03

## 2024-12-03 RX ORDER — ALBUTEROL SULFATE 90 UG/1
4 INHALANT RESPIRATORY (INHALATION) PRN
OUTPATIENT
Start: 2024-12-03

## 2024-12-03 RX ORDER — SODIUM CHLORIDE 9 MG/ML
5-250 INJECTION, SOLUTION INTRAVENOUS PRN
Status: DISCONTINUED | OUTPATIENT
Start: 2024-12-03 | End: 2024-12-04 | Stop reason: HOSPADM

## 2024-12-03 RX ORDER — HEPARIN 100 UNIT/ML
500 SYRINGE INTRAVENOUS PRN
OUTPATIENT
Start: 2024-12-03

## 2024-12-03 RX ORDER — EPINEPHRINE 1 MG/ML
0.3 INJECTION, SOLUTION INTRAMUSCULAR; SUBCUTANEOUS PRN
OUTPATIENT
Start: 2024-12-03

## 2024-12-03 RX ORDER — SODIUM CHLORIDE 9 MG/ML
INJECTION, SOLUTION INTRAVENOUS CONTINUOUS
OUTPATIENT
Start: 2024-12-03

## 2024-12-03 RX ORDER — SODIUM CHLORIDE 0.9 % (FLUSH) 0.9 %
5-40 SYRINGE (ML) INJECTION PRN
Status: DISCONTINUED | OUTPATIENT
Start: 2024-12-03 | End: 2024-12-04 | Stop reason: HOSPADM

## 2024-12-03 RX ORDER — HYDROCORTISONE SODIUM SUCCINATE 100 MG/2ML
100 INJECTION INTRAMUSCULAR; INTRAVENOUS
OUTPATIENT
Start: 2024-12-03

## 2024-12-03 RX ADMIN — IRON SUCROSE 200 MG: 20 INJECTION, SOLUTION INTRAVENOUS at 11:22

## 2024-12-03 RX ADMIN — SODIUM CHLORIDE, PRESERVATIVE FREE 20 ML: 5 INJECTION INTRAVENOUS at 11:27

## 2024-12-03 RX ADMIN — SODIUM CHLORIDE, PRESERVATIVE FREE 10 ML: 5 INJECTION INTRAVENOUS at 11:20

## 2024-12-03 ASSESSMENT — PAIN SCALES - GENERAL: PAINLEVEL_OUTOF10: 0

## 2024-12-03 NOTE — PROGRESS NOTES
[facility] Peds/Adult Note                       Date: December 3, 2024    Name: Jose Angeles    MRN: [id number]         : 1970    1115 Patient arrives for IV Venofer without acute problems. Please see EPIC for complete assessment and education provided.    Vital signs stable throughout and prior to discharge. Patient tolerated procedure well and was discharged without incident.  Caregiver is aware of no future [facility] appointments.   Visit Summary given to the Caregiver.      Mr. Angeles's vitals were reviewed prior to and after treatment.   Patient Vitals for the past 12 hrs:   Temp Pulse Resp BP SpO2   24 1127 -- 72 18 110/68 --   24 1115 97.9 °F (36.6 °C) 71 18 101/73 96 %       Medications given:   Medications Administered         iron sucrose (VENOFER) injection 200 mg Admin Date  2024 Action  Given Dose  200 mg Route  IntraVENous Documented By  Karol Combs, RN        sodium chloride flush 0.9 % injection 5-40 mL Admin Date  2024 Action  Given Dose  10 mL Route  IntraVENous Documented By  Karol Combs, RN        sodium chloride flush 0.9 % injection 5-40 mL Admin Date  2024 Action  Given Dose  20 mL Route  IntraVENous Documented By  Karol Combs, YURI              Mr. Angeles tolerated the treatment and had no complaints.    [facility]. Discharge Instructions provided to patient, patient verbalized understanding.     Future Appointments   Date Time Provider Department Center   2025 11:00 AM Amanda Swanson APRN - CNP [facility]   6/10/2025 11:00 AM [facility]       Karol Combs RN  December 3, 2024  11:51 AM

## (undated) DEVICE — SUTURE VCRL SZ 2-0 L27IN ABSRB UD L26MM SH 1/2 CIR J417H

## (undated) DEVICE — BW-412T DISP COMBO CLEANING BRUSH: Brand: SINGLE USE COMBINATION CLEANING BRUSH

## (undated) DEVICE — BITE BLK ENDOSCP AD 54FR GRN POLYETH ENDOSCP W STRP SLD

## (undated) DEVICE — AIRLIFE™ U/CONNECT-IT OXYGEN TUBING 7 FEET (2.1 M) CRUSH-RESISTANT OXYGEN TUBING, VINYL TIPPED: Brand: AIRLIFE™

## (undated) DEVICE — Device

## (undated) DEVICE — WRISTBAND ID AD W1XL11.5IN RED POLY ALRG PREPRINTED PERM

## (undated) DEVICE — SET EXTN TBNG L BOR 4 W STPCOCK ST 32IN PRIMING VOL 6ML

## (undated) DEVICE — BANDAGE COMPR 9 FTX4 IN SMOOTH COMFORTABLE SYNTH ESMRK LF

## (undated) DEVICE — GOWN,SIRUS,NONRNF,SETINSLV,2XL,18/CS: Brand: MEDLINE

## (undated) DEVICE — CANN NASAL O2 CAPNOGRAPHY AD -- FILTERLINE

## (undated) DEVICE — CUFF BLD PRSS CHILD SZ 9 FOR 15-21CM LIMB VYN SFT W/O TB

## (undated) DEVICE — BASIC PACK: Brand: CONVERTORS

## (undated) DEVICE — DRAPE,U/ SHT,SPLIT,PLAS,STERIL: Brand: MEDLINE

## (undated) DEVICE — BAG BELONG PT PERS CLEAR HANDL

## (undated) DEVICE — STERILE POLYISOPRENE POWDER-FREE SURGICAL GLOVES: Brand: PROTEXIS

## (undated) DEVICE — (D)PREP SKN CHLRAPRP APPL 26ML -- CONVERT TO ITEM 371833

## (undated) DEVICE — CONNECTOR TBNG AUX H2O JET DISP FOR OLY 160/180 SER

## (undated) DEVICE — (D)STRIP SKN CLSR 0.5X4IN WHT --

## (undated) DEVICE — SUTURE MCRYL SZ 4 0 L18IN ABSRB UD PC 5 L19MM 3 8 CIR SGL Y823G

## (undated) DEVICE — DRAPE,REIN 53X77,STERILE: Brand: MEDLINE

## (undated) DEVICE — KIT INSTR W/ 2.4MM GUIDEPIN SUT PASS WIRE NO2 FIBERWIRE

## (undated) DEVICE — ROCKER SWITCH PENCIL BLADE ELECTRODE, HOLSTER: Brand: EDGE

## (undated) DEVICE — PADDING CAST 3 INX4 YD STRL

## (undated) DEVICE — SET ADMIN 16ML TBNG L100IN 2 Y INJ SITE IV PIGGY BK DISP

## (undated) DEVICE — SURGICAL PROCEDURE PACK BASIN MAJ SET CUST NO CAUT

## (undated) DEVICE — SOLIDIFIER FLUID 3000 CC ABSORB

## (undated) DEVICE — Z DISCONTINUED USE 2751540 TUBING IRRIG L10IN DISP PMP ENDOGATOR

## (undated) DEVICE — KENDALL RADIOLUCENT FOAM MONITORING ELECTRODE -RECTANGULAR SHAPE: Brand: KENDALL

## (undated) DEVICE — CATH IV AUTOGRD BC BLU 22GA 25 -- INSYTE

## (undated) DEVICE — GAUZE SPONGES,12 PLY: Brand: CURITY

## (undated) DEVICE — TOWEL SURG W17XL27IN STD BLU COT NONFENESTRATED PREWASHED

## (undated) DEVICE — PADDING CAST 4 INX5 YD STRL

## (undated) DEVICE — STERILE POLYISOPRENE POWDER-FREE SURGICAL GLOVES WITH EMOLLIENT COATING: Brand: PROTEXIS

## (undated) DEVICE — QUILTED PREMIUM COMFORT UNDERPAD,EXTRA HEAVY: Brand: WINGS

## (undated) DEVICE — INTENDED FOR TISSUE SEPARATION, AND OTHER PROCEDURES THAT REQUIRE A SHARP SURGICAL BLADE TO PUNCTURE OR CUT.: Brand: BARD-PARKER ® CARBON RIB-BACK BLADES

## (undated) DEVICE — KIT IV STRT W CHLORAPREP PD 1ML

## (undated) DEVICE — 1200 GUARD II KIT W/5MM TUBE W/O VAC TUBE: Brand: GUARDIAN

## (undated) DEVICE — NEEDLE HYPO 18GA L1.5IN PNK S STL HUB POLYPR SHLD REG BVL

## (undated) DEVICE — 3M™ STERI-DRAPE™ U-DRAPE 1015: Brand: STERI-DRAPE™

## (undated) DEVICE — REM POLYHESIVE ADULT PATIENT RETURN ELECTRODE: Brand: VALLEYLAB

## (undated) DEVICE — ENDO CARRY-ON PROCEDURE KIT INCLUDES ENZYMATIC SPONGE, GAUZE, BIOHAZARD LABEL, TRAY, LUBRICANT, DIRTY SCOPE LABEL, WATER LABEL, TRAY, DRAWSTRING PAD, AND DEFENDO 4-PIECE KIT.: Brand: ENDO CARRY-ON PROCEDURE KIT

## (undated) DEVICE — INFECTION CONTROL KIT SYS

## (undated) DEVICE — DRAPE CARM MINI FOR IMAG SYS INSIGHT FLROSCN

## (undated) DEVICE — PADDING CST 4INX4YD --

## (undated) DEVICE — SOLUTION IV 1000ML 0.9% SOD CHL

## (undated) DEVICE — HANDLE LT SNAP ON ULT DURABLE LENS FOR TRUMPF ALC DISPOSABLE

## (undated) DEVICE — ZIMMER® STERILE DISPOSABLE TOURNIQUET CUFF WITH PLC, DUAL PORT, SINGLE BLADDER, 24 IN. (61 CM)

## (undated) DEVICE — SYRINGE MED 20ML STD CLR PLAS LUERLOCK TIP N CTRL DISP

## (undated) DEVICE — DEVON™ KNEE AND BODY STRAP 60" X 3" (1.5 M X 7.6 CM): Brand: DEVON

## (undated) DEVICE — DRAPE,EXTREMITY,89X128,STERILE: Brand: MEDLINE

## (undated) DEVICE — OCCLUSIVE GAUZE STRIP,3% BISMUTH TRIBROMOPHENATE IN PETROLATUM BLEND: Brand: XEROFORM

## (undated) DEVICE — GOWN,SIRUS,FABRNF,XL,20/CS: Brand: MEDLINE

## (undated) DEVICE — ARGYLE FRAZIER SURGICAL SUCTION INSTRUMENT 10 FR/CH (3.3 MM): Brand: ARGYLE